# Patient Record
Sex: FEMALE | Race: WHITE | Employment: OTHER | URBAN - METROPOLITAN AREA
[De-identification: names, ages, dates, MRNs, and addresses within clinical notes are randomized per-mention and may not be internally consistent; named-entity substitution may affect disease eponyms.]

---

## 2017-10-11 ENCOUNTER — APPOINTMENT (OUTPATIENT)
Dept: GENERAL RADIOLOGY | Age: 57
End: 2017-10-11
Attending: EMERGENCY MEDICINE
Payer: MEDICARE

## 2017-10-11 ENCOUNTER — HOSPITAL ENCOUNTER (EMERGENCY)
Age: 57
Discharge: HOME | End: 2017-10-12
Attending: EMERGENCY MEDICINE | Admitting: EMERGENCY MEDICINE
Payer: MEDICARE

## 2017-10-11 ENCOUNTER — APPOINTMENT (OUTPATIENT)
Dept: CT IMAGING | Age: 57
End: 2017-10-11
Attending: EMERGENCY MEDICINE
Payer: MEDICARE

## 2017-10-11 DIAGNOSIS — K52.9 COLITIS: Primary | ICD-10-CM

## 2017-10-11 LAB
ALBUMIN SERPL-MCNC: 3.5 G/DL (ref 3.5–5)
ALBUMIN/GLOB SERPL: 0.8 {RATIO} (ref 1.1–2.2)
ALP SERPL-CCNC: 178 U/L (ref 45–117)
ALT SERPL-CCNC: 22 U/L (ref 12–78)
ANION GAP SERPL CALC-SCNC: 9 MMOL/L (ref 5–15)
APPEARANCE UR: CLEAR
AST SERPL-CCNC: 35 U/L (ref 15–37)
BACTERIA URNS QL MICRO: NEGATIVE /HPF
BASOPHILS # BLD: 0 K/UL (ref 0–0.1)
BASOPHILS NFR BLD: 0 % (ref 0–1)
BILIRUB SERPL-MCNC: 0.5 MG/DL (ref 0.2–1)
BILIRUB UR QL: NEGATIVE
BUN SERPL-MCNC: 16 MG/DL (ref 6–20)
BUN/CREAT SERPL: 11 (ref 12–20)
CALCIUM SERPL-MCNC: 8.9 MG/DL (ref 8.5–10.1)
CHLORIDE SERPL-SCNC: 96 MMOL/L (ref 97–108)
CO2 SERPL-SCNC: 29 MMOL/L (ref 21–32)
COLOR UR: ABNORMAL
CREAT SERPL-MCNC: 1.43 MG/DL (ref 0.55–1.02)
EOSINOPHIL # BLD: 0.2 K/UL (ref 0–0.4)
EOSINOPHIL NFR BLD: 1 % (ref 0–7)
EPITH CASTS URNS QL MICRO: ABNORMAL /LPF
ERYTHROCYTE [DISTWIDTH] IN BLOOD BY AUTOMATED COUNT: 12.9 % (ref 11.5–14.5)
GLOBULIN SER CALC-MCNC: 4.4 G/DL (ref 2–4)
GLUCOSE SERPL-MCNC: 151 MG/DL (ref 65–100)
GLUCOSE UR STRIP.AUTO-MCNC: NEGATIVE MG/DL
HCT VFR BLD AUTO: 41.7 % (ref 35–47)
HGB BLD-MCNC: 14.4 G/DL (ref 11.5–16)
HGB UR QL STRIP: ABNORMAL
HYALINE CASTS URNS QL MICRO: ABNORMAL /LPF (ref 0–5)
KETONES UR QL STRIP.AUTO: NEGATIVE MG/DL
LEUKOCYTE ESTERASE UR QL STRIP.AUTO: NEGATIVE
LIPASE SERPL-CCNC: 35 U/L (ref 73–393)
LYMPHOCYTES # BLD: 2.5 K/UL (ref 0.8–3.5)
LYMPHOCYTES NFR BLD: 19 % (ref 12–49)
MCH RBC QN AUTO: 30.7 PG (ref 26–34)
MCHC RBC AUTO-ENTMCNC: 34.5 G/DL (ref 30–36.5)
MCV RBC AUTO: 88.9 FL (ref 80–99)
MONOCYTES # BLD: 0.9 K/UL (ref 0–1)
MONOCYTES NFR BLD: 7 % (ref 5–13)
NEUTS SEG # BLD: 9.6 K/UL (ref 1.8–8)
NEUTS SEG NFR BLD: 73 % (ref 32–75)
NITRITE UR QL STRIP.AUTO: NEGATIVE
PH UR STRIP: 7 [PH] (ref 5–8)
PLATELET # BLD AUTO: 370 K/UL (ref 150–400)
POTASSIUM SERPL-SCNC: 4.1 MMOL/L (ref 3.5–5.1)
PROT SERPL-MCNC: 7.9 G/DL (ref 6.4–8.2)
PROT UR STRIP-MCNC: NEGATIVE MG/DL
RBC # BLD AUTO: 4.69 M/UL (ref 3.8–5.2)
RBC #/AREA URNS HPF: ABNORMAL /HPF (ref 0–5)
SODIUM SERPL-SCNC: 134 MMOL/L (ref 136–145)
SP GR UR REFRACTOMETRY: 1.02 (ref 1–1.03)
UR CULT HOLD, URHOLD: NORMAL
UROBILINOGEN UR QL STRIP.AUTO: 1 EU/DL (ref 0.2–1)
WBC # BLD AUTO: 13.1 K/UL (ref 3.6–11)
WBC URNS QL MICRO: ABNORMAL /HPF (ref 0–4)

## 2017-10-11 PROCEDURE — 96361 HYDRATE IV INFUSION ADD-ON: CPT

## 2017-10-11 PROCEDURE — 80053 COMPREHEN METABOLIC PANEL: CPT | Performed by: EMERGENCY MEDICINE

## 2017-10-11 PROCEDURE — 74011250636 HC RX REV CODE- 250/636: Performed by: EMERGENCY MEDICINE

## 2017-10-11 PROCEDURE — 81001 URINALYSIS AUTO W/SCOPE: CPT | Performed by: EMERGENCY MEDICINE

## 2017-10-11 PROCEDURE — 96375 TX/PRO/DX INJ NEW DRUG ADDON: CPT

## 2017-10-11 PROCEDURE — 85025 COMPLETE CBC W/AUTO DIFF WBC: CPT | Performed by: EMERGENCY MEDICINE

## 2017-10-11 PROCEDURE — 83690 ASSAY OF LIPASE: CPT | Performed by: EMERGENCY MEDICINE

## 2017-10-11 PROCEDURE — 36415 COLL VENOUS BLD VENIPUNCTURE: CPT | Performed by: EMERGENCY MEDICINE

## 2017-10-11 PROCEDURE — 96376 TX/PRO/DX INJ SAME DRUG ADON: CPT

## 2017-10-11 PROCEDURE — 99283 EMERGENCY DEPT VISIT LOW MDM: CPT

## 2017-10-11 PROCEDURE — 74176 CT ABD & PELVIS W/O CONTRAST: CPT

## 2017-10-11 PROCEDURE — 74020 XR ABD FLAT/ ERECT: CPT

## 2017-10-11 PROCEDURE — 96374 THER/PROPH/DIAG INJ IV PUSH: CPT

## 2017-10-11 RX ORDER — ONDANSETRON 2 MG/ML
8 INJECTION INTRAMUSCULAR; INTRAVENOUS
Status: COMPLETED | OUTPATIENT
Start: 2017-10-11 | End: 2017-10-11

## 2017-10-11 RX ORDER — HYDROMORPHONE HYDROCHLORIDE 1 MG/ML
0.5 INJECTION, SOLUTION INTRAMUSCULAR; INTRAVENOUS; SUBCUTANEOUS
Status: COMPLETED | OUTPATIENT
Start: 2017-10-11 | End: 2017-10-11

## 2017-10-11 RX ORDER — SODIUM CHLORIDE 0.9 % (FLUSH) 0.9 %
10 SYRINGE (ML) INJECTION
Status: DISCONTINUED | OUTPATIENT
Start: 2017-10-11 | End: 2017-10-12 | Stop reason: HOSPADM

## 2017-10-11 RX ORDER — HYDROMORPHONE HYDROCHLORIDE 1 MG/ML
0.5 INJECTION, SOLUTION INTRAMUSCULAR; INTRAVENOUS; SUBCUTANEOUS ONCE
Status: COMPLETED | OUTPATIENT
Start: 2017-10-11 | End: 2017-10-11

## 2017-10-11 RX ADMIN — ONDANSETRON 8 MG: 2 INJECTION INTRAMUSCULAR; INTRAVENOUS at 22:17

## 2017-10-11 RX ADMIN — SODIUM CHLORIDE 1000 ML: 900 INJECTION, SOLUTION INTRAVENOUS at 23:11

## 2017-10-11 RX ADMIN — SODIUM CHLORIDE 1000 ML: 900 INJECTION, SOLUTION INTRAVENOUS at 22:16

## 2017-10-11 RX ADMIN — HYDROMORPHONE HYDROCHLORIDE 0.5 MG: 1 INJECTION, SOLUTION INTRAMUSCULAR; INTRAVENOUS; SUBCUTANEOUS at 23:57

## 2017-10-11 RX ADMIN — HYDROMORPHONE HYDROCHLORIDE 0.5 MG: 1 INJECTION, SOLUTION INTRAMUSCULAR; INTRAVENOUS; SUBCUTANEOUS at 22:17

## 2017-10-11 NOTE — Clinical Note
Start the antibiotics. Please call and arrange close follow up with the gastroenterologist first thing in the morning. Let them know you were in the ED with abdominal pain and diagnosed with Colitis with bloody stool. If they are unable to see you with in 48 hours, or if your symptoms worsen despite the medications, fever, uncontrolled pain/vomiting or you get lightheaded/dizzy- return here right away.

## 2017-10-12 VITALS
OXYGEN SATURATION: 98 % | RESPIRATION RATE: 21 BRPM | HEIGHT: 64 IN | DIASTOLIC BLOOD PRESSURE: 75 MMHG | TEMPERATURE: 99.2 F | SYSTOLIC BLOOD PRESSURE: 158 MMHG | HEART RATE: 90 BPM

## 2017-10-12 LAB — HEMOCCULT STL QL: POSITIVE

## 2017-10-12 PROCEDURE — 82272 OCCULT BLD FECES 1-3 TESTS: CPT | Performed by: EMERGENCY MEDICINE

## 2017-10-12 PROCEDURE — 36415 COLL VENOUS BLD VENIPUNCTURE: CPT | Performed by: EMERGENCY MEDICINE

## 2017-10-12 PROCEDURE — 74011250637 HC RX REV CODE- 250/637: Performed by: EMERGENCY MEDICINE

## 2017-10-12 RX ORDER — METRONIDAZOLE 250 MG/1
TABLET ORAL
Status: DISCONTINUED
Start: 2017-10-12 | End: 2017-10-12 | Stop reason: HOSPADM

## 2017-10-12 RX ORDER — CIPROFLOXACIN 500 MG/1
TABLET ORAL
Status: DISCONTINUED
Start: 2017-10-12 | End: 2017-10-12 | Stop reason: HOSPADM

## 2017-10-12 RX ORDER — METRONIDAZOLE 250 MG/1
500 TABLET ORAL ONCE
Status: COMPLETED | OUTPATIENT
Start: 2017-10-12 | End: 2017-10-12

## 2017-10-12 RX ORDER — CIPROFLOXACIN 500 MG/1
500 TABLET ORAL ONCE
Status: COMPLETED | OUTPATIENT
Start: 2017-10-12 | End: 2017-10-12

## 2017-10-12 RX ORDER — METRONIDAZOLE 500 MG/1
500 TABLET ORAL 2 TIMES DAILY
Qty: 20 TAB | Refills: 0 | Status: SHIPPED | OUTPATIENT
Start: 2017-10-12 | End: 2017-10-22

## 2017-10-12 RX ORDER — CIPROFLOXACIN 500 MG/1
500 TABLET ORAL 2 TIMES DAILY
Qty: 20 TAB | Refills: 0 | Status: SHIPPED | OUTPATIENT
Start: 2017-10-12 | End: 2017-10-22

## 2017-10-12 RX ORDER — ONDANSETRON 8 MG/1
8 TABLET, ORALLY DISINTEGRATING ORAL
Qty: 8 TAB | Refills: 0 | Status: SHIPPED | OUTPATIENT
Start: 2017-10-12 | End: 2019-06-14 | Stop reason: ALTCHOICE

## 2017-10-12 RX ORDER — HYDROCODONE BITARTRATE AND ACETAMINOPHEN 5; 325 MG/1; MG/1
1-2 TABLET ORAL
Qty: 20 TAB | Refills: 0 | Status: SHIPPED | OUTPATIENT
Start: 2017-10-12 | End: 2019-06-04

## 2017-10-12 RX ADMIN — METRONIDAZOLE 500 MG: 250 TABLET ORAL at 01:03

## 2017-10-12 RX ADMIN — CIPROFLOXACIN HYDROCHLORIDE 500 MG: 500 TABLET, FILM COATED ORAL at 01:03

## 2017-10-12 NOTE — ED TRIAGE NOTES
Triage:  Pt to ED due to left lower abd pain and rectal bleeding. Pt states she has been dealing with constipation and has taking variety of OTC medications to aid with the constipation. Pt also mentioned she massages her abd to help \"break up the stool\" so she can pass it. PT states over the past couple hours she has had scant output and around 1800 hours she developed severe left lower quad pain and rectal bleeding. Pt into triage with steady gait, Pt appears anxious in triage, but no visible cues of distress present.
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
## 2017-10-12 NOTE — DISCHARGE INSTRUCTIONS
Colitis: Care Instructions  Your Care Instructions  Colitis is the medical term for swelling (inflammation) of the intestine. It can be caused by different things, such as an infection or loss of blood flow in the intestine. Other causes are problems like Crohn's disease or ulcerative colitis. Symptoms may include fever, diarrhea that may be bloody, or belly pain. Sometimes symptoms go away without treatment. But you may need treatment or more tests, such as blood tests or a stool test. Or you may need imaging tests like a CT scan or a colonoscopy. In some cases, the doctor may want to test a sample of tissue from the intestine. This test is called a biopsy. The doctor has checked you carefully, but problems can develop later. If you notice any problems or new symptoms, get medical treatment right away. Follow-up care is a key part of your treatment and safety. Be sure to make and go to all appointments, and call your doctor if you are having problems. It's also a good idea to know your test results and keep a list of the medicines you take. How can you care for yourself at home? · Rest until you feel better. · Your doctor may recommend that you eat bland foods. These include rice, dry toast or crackers, bananas, and applesauce. · To prevent dehydration, drink plenty of fluids. Choose water and other caffeine-free clear liquids until you feel better. If you have kidney, heart, or liver disease and have to limit fluids, talk with your doctor before you increase the amount of fluids you drink. · Be safe with medicines. Take your medicines exactly as prescribed. Call your doctor if you think you are having a problem with your medicine. You will get more details on the specific medicines your doctor prescribes. When should you call for help? Call 911 anytime you think you may need emergency care. For example, call if:  · You passed out (lost consciousness).   · You vomit blood or what looks like coffee grounds. · Your stools are maroon or very bloody. Call your doctor now or seek immediate medical care if:  · You have new or worse pain. · You have a new or higher fever. · You have new or worse symptoms. · You cannot keep fluids or medicines down. Watch closely for changes in your health, and be sure to contact your doctor if:  · You do not get better as expected. Where can you learn more? Go to http://nicolás-jodie.info/. Julius Montes in the search box to learn more about \"Colitis: Care Instructions. \"  Current as of: August 9, 2016  Content Version: 11.3  © 0126-4357 [employer]. Care instructions adapted under license by [employer] (which disclaims liability or warranty for this information). If you have questions about a medical condition or this instruction, always ask your healthcare professional. [address] any warranty or liability for your use of this information. We hope that we have addressed all of your medical concerns. The examination and treatment you received in the Emergency Department were for an emergent problem and were not intended as complete care. It is important that you follow up with your healthcare provider(s) for ongoing care. If your symptoms worsen or do not improve as expected, and you are unable to reach your usual health care provider(s), you should return to the Emergency Department. Today's healthcare is undergoing tremendous change, and patient satisfaction surveys are one of the many tools to assess the quality of medical care. You may receive a survey from the [employer] organization regarding your experience in the Emergency Department. I hope that your experience has been completely positive, particularly the medical care that I provided. As such, please participate in the survey; anything less than excellent does not meet my expectations or intentions.         [facility] and [facility] Macias participate in nationally recognized quality of care measures. If your blood pressure is greater than 120/80, as reported below, we urge that you seek medical care to address the potential of high blood pressure, commonly known as hypertension. Hypertension can be hereditary or can be caused by certain medical conditions, pain, stress, or \"white coat syndrome. \"       Please make an appointment with your health care provider(s) for follow up of your Emergency Department visit. VITALS:   Patient Vitals for the past 8 hrs:   Temp Pulse Resp BP SpO2   10/12/17 0028 99.2 °F (37.3 °C) 90 21 158/75 98 %   10/11/17 2009 99 °F (37.2 °C) (!) 108 22 184/82 94 %          Thank you for allowing us to provide you with medical care today. We realize that you have many choices for your emergency care needs. Please choose us in the future for any continued health care needs. Lul Foley MD    [address] [place] [address].   Office: [phone]            Recent Results (from the past 24 hour(s))   METABOLIC PANEL, COMPREHENSIVE    Collection Time: 10/11/17  8:43 PM   Result Value Ref Range    Sodium 134 (L) 136 - 145 mmol/L    Potassium 4.1 3.5 - 5.1 mmol/L    Chloride 96 (L) 97 - 108 mmol/L    CO2 29 21 - 32 mmol/L    Anion gap 9 5 - 15 mmol/L    Glucose 151 (H) 65 - 100 mg/dL    BUN 16 6 - 20 MG/DL    Creatinine 1.43 (H) 0.55 - 1.02 MG/DL    BUN/Creatinine ratio 11 (L) 12 - 20      GFR est AA 46 (L) >60 ml/min/1.73m2    GFR est non-AA 38 (L) >60 ml/min/1.73m2    Calcium 8.9 8.5 - 10.1 MG/DL    Bilirubin, total 0.5 0.2 - 1.0 MG/DL    ALT (SGPT) 22 12 - 78 U/L    AST (SGOT) 35 15 - 37 U/L    Alk.  phosphatase 178 (H) 45 - 117 U/L    Protein, total 7.9 6.4 - 8.2 g/dL    Albumin 3.5 3.5 - 5.0 g/dL    Globulin 4.4 (H) 2.0 - 4.0 g/dL    A-G Ratio 0.8 (L) 1.1 - 2.2     LIPASE    Collection Time: 10/11/17  8:43 PM   Result Value Ref Range    Lipase 35 (L) 73 - 393 U/L   CBC WITH AUTOMATED DIFF    Collection Time: 10/11/17  8:43 PM   Result Value Ref Range    WBC 13.1 (H) 3.6 - 11.0 K/uL    RBC 4.69 3.80 - 5.20 M/uL    HGB 14.4 11.5 - 16.0 g/dL    HCT 41.7 35.0 - 47.0 %    MCV 88.9 80.0 - 99.0 FL    MCH 30.7 26.0 - 34.0 PG    MCHC 34.5 30.0 - 36.5 g/dL    RDW 12.9 11.5 - 14.5 %    PLATELET 323 521 - 939 K/uL    NEUTROPHILS 73 32 - 75 %    LYMPHOCYTES 19 12 - 49 %    MONOCYTES 7 5 - 13 %    EOSINOPHILS 1 0 - 7 %    BASOPHILS 0 0 - 1 %    ABS. NEUTROPHILS 9.6 (H) 1.8 - 8.0 K/UL    ABS. LYMPHOCYTES 2.5 0.8 - 3.5 K/UL    ABS. MONOCYTES 0.9 0.0 - 1.0 K/UL    ABS. EOSINOPHILS 0.2 0.0 - 0.4 K/UL    ABS. BASOPHILS 0.0 0.0 - 0.1 K/UL   URINALYSIS W/MICROSCOPIC    Collection Time: 10/11/17  9:19 PM   Result Value Ref Range    Color YELLOW/STRAW      Appearance CLEAR CLEAR      Specific gravity 1.016 1.003 - 1.030      pH (UA) 7.0 5.0 - 8.0      Protein NEGATIVE  NEG mg/dL    Glucose NEGATIVE  NEG mg/dL    Ketone NEGATIVE  NEG mg/dL    Bilirubin NEGATIVE  NEG      Blood TRACE (A) NEG      Urobilinogen 1.0 0.2 - 1.0 EU/dL    Nitrites NEGATIVE  NEG      Leukocyte Esterase NEGATIVE  NEG      WBC 0-4 0 - 4 /hpf    RBC 5-10 0 - 5 /hpf    Epithelial cells FEW FEW /lpf    Bacteria NEGATIVE  NEG /hpf    Hyaline cast 0-2 0 - 5 /lpf   URINE CULTURE HOLD SAMPLE    Collection Time: 10/11/17  9:19 PM   Result Value Ref Range    Urine culture hold URINE ON HOLD IN MICROBIOLOGY DEPT FOR 3 DAYS     OCCULT BLOOD, STOOL    Collection Time: 10/12/17 12:16 AM   Result Value Ref Range    Occult blood, stool POSITIVE (A) NEG         Xr Abd Flat/ Erect    Result Date: 10/11/2017  Clinical history: Abdominal pain, constipation FINDINGS: Flat and upright views of the abdomen are obtained. There is no obstruction or free air. There is no organomegaly. There is no abnormal calcification. Postcholecystectomy. IMPRESSION: No acute process demonstrated.     Ct Abd Pelv Wo Cont    Result Date: 10/11/2017  EXAM:  CT ABD PELV WO CONT Clinical history: Left lower quadrant pain with bloody stools INDICATION: LLQ pain, bloody stools COMPARISON: 2011 CONTRAST:  None. TECHNIQUE: Thin axial images were obtained through the abdomen and pelvis. Coronal and sagittal reconstructions were generated. Oral contrast was not administered. CT dose reduction was achieved through use of a standardized protocol tailored for this examination and automatic exposure control for dose modulation. The absence of intravenous contrast material reduces the sensitivity for evaluation of the solid parenchymal organs of the abdomen. FINDINGS: LUNG BASES: There is minimal dependent change at the right lung base. Tiny nodules at the right and left lung bases. INCIDENTALLY IMAGED HEART AND MEDIASTINUM: Unremarkable. LIVER: Heterogenous hepatic density likely related to hepatic steatosis GALLBLADDER: Absent SPLEEN: No mass. PANCREAS: Duodenal diverticulum. Mild prominent CBD in the pancreatic head this is not changed compared to 2011 ADRENALS: Unremarkable. KIDNEYS/URETERS: Renal hypodensities on the right are consistent with simple cysts unchanged. STOMACH: Unremarkable. SMALL BOWEL: No dilatation or wall thickening. COLON: There is colonic wall thickening in the descending colon extending into the sigmoid colon. Minimal pericolonic inflammatory stranding. APPENDIX: Unremarkable. PERITONEUM: No ascites or pneumoperitoneum. RETROPERITONEUM: No lymphadenopathy or aortic aneurysm. REPRODUCTIVE ORGANS: URINARY BLADDER: No mass or calculus. BONES: No destructive bone lesion. ADDITIONAL COMMENTS: N/A     IMPRESSION: Findings which are  consistent with a colitis affecting the descending colon. No abscess or drainable fluid collection. No obstruction. There are renal cysts which are unchanged compared to 2011. Heterogenous hepatic density is likely related to hepatic steatosis. Status post cholecystectomy.

## 2017-10-12 NOTE — ED NOTES
MD reviewed discharge instructions with the patient. The patient verbalized understanding. Patient ambulated out of ED by herself. No complaints or concerns.

## 2018-05-31 ENCOUNTER — HOSPITAL ENCOUNTER (EMERGENCY)
Age: 58
Discharge: HOME | End: 2018-06-01
Attending: EMERGENCY MEDICINE | Admitting: EMERGENCY MEDICINE
Payer: MEDICARE

## 2018-05-31 ENCOUNTER — APPOINTMENT (OUTPATIENT)
Dept: CT IMAGING | Age: 58
End: 2018-05-31
Attending: EMERGENCY MEDICINE
Payer: MEDICARE

## 2018-05-31 DIAGNOSIS — R51.9 NONINTRACTABLE HEADACHE, UNSPECIFIED CHRONICITY PATTERN, UNSPECIFIED HEADACHE TYPE: Primary | ICD-10-CM

## 2018-05-31 DIAGNOSIS — R20.2 PARESTHESIA OF BOTH HANDS: ICD-10-CM

## 2018-05-31 LAB
ALBUMIN SERPL-MCNC: 3.5 G/DL (ref 3.5–5)
ALBUMIN/GLOB SERPL: 1 {RATIO} (ref 1.1–2.2)
ALP SERPL-CCNC: 136 U/L (ref 45–117)
ALT SERPL-CCNC: 22 U/L (ref 12–78)
ANION GAP SERPL CALC-SCNC: 8 MMOL/L (ref 5–15)
AST SERPL-CCNC: 21 U/L (ref 15–37)
BASOPHILS # BLD: 0.1 K/UL (ref 0–0.1)
BASOPHILS NFR BLD: 1 % (ref 0–1)
BILIRUB SERPL-MCNC: 0.1 MG/DL (ref 0.2–1)
BUN SERPL-MCNC: 12 MG/DL (ref 6–20)
BUN/CREAT SERPL: 10 (ref 12–20)
CALCIUM SERPL-MCNC: 8.4 MG/DL (ref 8.5–10.1)
CHLORIDE SERPL-SCNC: 106 MMOL/L (ref 97–108)
CO2 SERPL-SCNC: 30 MMOL/L (ref 21–32)
CREAT SERPL-MCNC: 1.2 MG/DL (ref 0.55–1.02)
DIFFERENTIAL METHOD BLD: ABNORMAL
EOSINOPHIL # BLD: 0.8 K/UL (ref 0–0.4)
EOSINOPHIL NFR BLD: 9 % (ref 0–7)
ERYTHROCYTE [DISTWIDTH] IN BLOOD BY AUTOMATED COUNT: 12.8 % (ref 11.5–14.5)
GLOBULIN SER CALC-MCNC: 3.5 G/DL (ref 2–4)
GLUCOSE SERPL-MCNC: 133 MG/DL (ref 65–100)
HCT VFR BLD AUTO: 38.1 % (ref 35–47)
HGB BLD-MCNC: 12.5 G/DL (ref 11.5–16)
IMM GRANULOCYTES # BLD: 0 K/UL (ref 0–0.04)
IMM GRANULOCYTES NFR BLD AUTO: 0 % (ref 0–0.5)
LYMPHOCYTES # BLD: 2.9 K/UL (ref 0.8–3.5)
LYMPHOCYTES NFR BLD: 31 % (ref 12–49)
MCH RBC QN AUTO: 30 PG (ref 26–34)
MCHC RBC AUTO-ENTMCNC: 32.8 G/DL (ref 30–36.5)
MCV RBC AUTO: 91.4 FL (ref 80–99)
MONOCYTES # BLD: 0.7 K/UL (ref 0–1)
MONOCYTES NFR BLD: 7 % (ref 5–13)
NEUTS SEG # BLD: 4.8 K/UL (ref 1.8–8)
NEUTS SEG NFR BLD: 52 % (ref 32–75)
NRBC # BLD: 0 K/UL (ref 0–0.01)
NRBC BLD-RTO: 0 PER 100 WBC
PLATELET # BLD AUTO: 354 K/UL (ref 150–400)
PMV BLD AUTO: 9.3 FL (ref 8.9–12.9)
POTASSIUM SERPL-SCNC: 3.4 MMOL/L (ref 3.5–5.1)
PROT SERPL-MCNC: 7 G/DL (ref 6.4–8.2)
RBC # BLD AUTO: 4.17 M/UL (ref 3.8–5.2)
SODIUM SERPL-SCNC: 144 MMOL/L (ref 136–145)
WBC # BLD AUTO: 9.4 K/UL (ref 3.6–11)

## 2018-05-31 PROCEDURE — 96361 HYDRATE IV INFUSION ADD-ON: CPT

## 2018-05-31 PROCEDURE — 99283 EMERGENCY DEPT VISIT LOW MDM: CPT

## 2018-05-31 PROCEDURE — 85025 COMPLETE CBC W/AUTO DIFF WBC: CPT | Performed by: EMERGENCY MEDICINE

## 2018-05-31 PROCEDURE — 96375 TX/PRO/DX INJ NEW DRUG ADDON: CPT

## 2018-05-31 PROCEDURE — 80053 COMPREHEN METABOLIC PANEL: CPT | Performed by: EMERGENCY MEDICINE

## 2018-05-31 PROCEDURE — 74011250636 HC RX REV CODE- 250/636: Performed by: EMERGENCY MEDICINE

## 2018-05-31 PROCEDURE — 36415 COLL VENOUS BLD VENIPUNCTURE: CPT | Performed by: EMERGENCY MEDICINE

## 2018-05-31 PROCEDURE — 70450 CT HEAD/BRAIN W/O DYE: CPT

## 2018-05-31 PROCEDURE — 96374 THER/PROPH/DIAG INJ IV PUSH: CPT

## 2018-05-31 RX ORDER — METOCLOPRAMIDE HYDROCHLORIDE 5 MG/ML
10 INJECTION INTRAMUSCULAR; INTRAVENOUS
Status: COMPLETED | OUTPATIENT
Start: 2018-05-31 | End: 2018-05-31

## 2018-05-31 RX ORDER — DIPHENHYDRAMINE HYDROCHLORIDE 50 MG/ML
25 INJECTION, SOLUTION INTRAMUSCULAR; INTRAVENOUS
Status: COMPLETED | OUTPATIENT
Start: 2018-05-31 | End: 2018-05-31

## 2018-05-31 RX ORDER — SODIUM CHLORIDE 9 MG/ML
1000 INJECTION, SOLUTION INTRAVENOUS ONCE
Status: COMPLETED | OUTPATIENT
Start: 2018-05-31 | End: 2018-05-31

## 2018-05-31 RX ORDER — KETOROLAC TROMETHAMINE 30 MG/ML
15 INJECTION, SOLUTION INTRAMUSCULAR; INTRAVENOUS
Status: COMPLETED | OUTPATIENT
Start: 2018-05-31 | End: 2018-05-31

## 2018-05-31 RX ORDER — DEXAMETHASONE SODIUM PHOSPHATE 10 MG/ML
10 INJECTION INTRAMUSCULAR; INTRAVENOUS ONCE
Status: COMPLETED | OUTPATIENT
Start: 2018-05-31 | End: 2018-05-31

## 2018-05-31 RX ADMIN — METOCLOPRAMIDE 10 MG: 5 INJECTION, SOLUTION INTRAMUSCULAR; INTRAVENOUS at 21:45

## 2018-05-31 RX ADMIN — SODIUM CHLORIDE 1000 ML: 900 INJECTION, SOLUTION INTRAVENOUS at 21:48

## 2018-05-31 RX ADMIN — DEXAMETHASONE SODIUM PHOSPHATE 10 MG: 10 INJECTION, SOLUTION INTRAMUSCULAR; INTRAVENOUS at 21:47

## 2018-05-31 RX ADMIN — KETOROLAC TROMETHAMINE 15 MG: 30 INJECTION, SOLUTION INTRAMUSCULAR; INTRAVENOUS at 21:44

## 2018-05-31 RX ADMIN — DIPHENHYDRAMINE HYDROCHLORIDE 25 MG: 50 INJECTION, SOLUTION INTRAMUSCULAR; INTRAVENOUS at 21:48

## 2018-06-01 VITALS
RESPIRATION RATE: 18 BRPM | OXYGEN SATURATION: 94 % | SYSTOLIC BLOOD PRESSURE: 116 MMHG | TEMPERATURE: 98.1 F | BODY MASS INDEX: 23.73 KG/M2 | HEART RATE: 72 BPM | DIASTOLIC BLOOD PRESSURE: 67 MMHG | HEIGHT: 64 IN | WEIGHT: 139 LBS

## 2018-06-01 NOTE — DISCHARGE INSTRUCTIONS
Headache: Care Instructions  Your Care Instructions    Headaches have many possible causes. Most headaches aren't a sign of a more serious problem, and they will get better on their own. Home treatment may help you feel better faster. The doctor has checked you carefully, but problems can develop later. If you notice any problems or new symptoms, get medical treatment right away. Follow-up care is a key part of your treatment and safety. Be sure to make and go to all appointments, and call your doctor if you are having problems. It's also a good idea to know your test results and keep a list of the medicines you take. How can you care for yourself at home? · Do not drive if you have taken a prescription pain medicine. · Rest in a quiet, dark room until your headache is gone. Close your eyes and try to relax or go to sleep. Don't watch TV or read. · Put a cold, moist cloth or cold pack on the painful area for 10 to 20 minutes at a time. Put a thin cloth between the cold pack and your skin. · Use a warm, moist towel or a heating pad set on low to relax tight shoulder and neck muscles. · Have someone gently massage your neck and shoulders. · Take pain medicines exactly as directed. ¨ If the doctor gave you a prescription medicine for pain, take it as prescribed. ¨ If you are not taking a prescription pain medicine, ask your doctor if you can take an over-the-counter medicine. · Be careful not to take pain medicine more often than the instructions allow, because you may get worse or more frequent headaches when the medicine wears off. · Do not ignore new symptoms that occur with a headache, such as a fever, weakness or numbness, vision changes, or confusion. These may be signs of a more serious problem. To prevent headaches  · Keep a headache diary so you can figure out what triggers your headaches. Avoiding triggers may help you prevent headaches.  Record when each headache began, how long it lasted, and what the pain was like (throbbing, aching, stabbing, or dull). Write down any other symptoms you had with the headache, such as nausea, flashing lights or dark spots, or sensitivity to bright light or loud noise. Note if the headache occurred near your period. List anything that might have triggered the headache, such as certain foods (chocolate, cheese, wine) or odors, smoke, bright light, stress, or lack of sleep. · Find healthy ways to deal with stress. Headaches are most common during or right after stressful times. Take time to relax before and after you do something that has caused a headache in the past.  · Try to keep your muscles relaxed by keeping good posture. Check your jaw, face, neck, and shoulder muscles for tension, and try relaxing them. When sitting at a desk, change positions often, and stretch for 30 seconds each hour. · Get plenty of sleep and exercise. · Eat regularly and well. Long periods without food can trigger a headache. · Treat yourself to a massage. Some people find that regular massages are very helpful in relieving tension. · Limit caffeine by not drinking too much coffee, tea, or soda. But don't quit caffeine suddenly, because that can also give you headaches. · Reduce eyestrain from computers by blinking frequently and looking away from the computer screen every so often. Make sure you have proper eyewear and that your monitor is set up properly, about an arm's length away. · Seek help if you have depression or anxiety. Your headaches may be linked to these conditions. Treatment can both prevent headaches and help with symptoms of anxiety or depression. When should you call for help? Call 911 anytime you think you may need emergency care. For example, call if:  ? · You have signs of a stroke. These may include:  ¨ Sudden numbness, paralysis, or weakness in your face, arm, or leg, especially on only one side of your body. ¨ Sudden vision changes.   ¨ Sudden trouble speaking. ¨ Sudden confusion or trouble understanding simple statements. ¨ Sudden problems with walking or balance. ¨ A sudden, severe headache that is different from past headaches. ?Call your doctor now or seek immediate medical care if:  ? · You have a new or worse headache. ? · Your headache gets much worse. Where can you learn more? Go to http://nicolás-jodie.info/. Enter M271 in the search box to learn more about \"Headache: Care Instructions. \"  Current as of: October 14, 2016  Content Version: 11.4  © 9161-0929 [employer]. Care instructions adapted under license by [employer] (which disclaims liability or warranty for this information). If you have questions about a medical condition or this instruction, always ask your healthcare professional. [address] any warranty or liability for your use of this information. Numbness and Tingling: Care Instructions  Your Care Instructions    Many things can cause numbness or tingling. Swelling may put pressure on a nerve. This could cause you to lose feeling or have a pins-and-needles sensation on part of your body. Nerves may be damaged from trauma, toxins, or diseases, such as diabetes or multiple sclerosis (MS). Sometimes, though, the cause is not clear. If there is no clear reason for your symptoms, and you are not having any other symptoms, your doctor may suggest watching and waiting for a while to see if the numbness or tingling goes away on its own. Your doctor may want you to have blood or nerve tests to find the cause of your symptoms. Follow-up care is a key part of your treatment and safety. Be sure to make and go to all appointments, and call your doctor if you are having problems. It's also a good idea to know your test results and keep a list of the medicines you take. How can you care for yourself at home?   · If your doctor prescribes medicine, take it exactly as directed. Call your doctor if you think you are having a problem with your medicine. · If you have any swelling, put ice or a cold pack on the area for 10 to 20 minutes at a time. Put a thin cloth between the ice and your skin. When should you call for help? Call 911 anytime you think you may need emergency care. For example, call if:  ? · You have weakness, numbness, or tingling in both legs. ? · You lose bowel or bladder control. ? · You have symptoms of a stroke. These may include:  ¨ Sudden numbness, tingling, weakness, or loss of movement in your face, arm, or leg, especially on only one side of your body. ¨ Sudden vision changes. ¨ Sudden trouble speaking. ¨ Sudden confusion or trouble understanding simple statements. ¨ Sudden problems with walking or balance. ¨ A sudden, severe headache that is different from past headaches. ? Watch closely for changes in your health, and be sure to contact your doctor if you have any problems, or if:  ? · You do not get better as expected. Where can you learn more? Go to http://nicolás-jodie.info/. Enter U944 in the search box to learn more about \"Numbness and Tingling: Care Instructions. \"  Current as of: October 14, 2016  Content Version: 11.4  © 9931-8634 [employer]. Care instructions adapted under license by [employer] (which disclaims liability or warranty for this information). If you have questions about a medical condition or this instruction, always ask your healthcare professional. [place] [address] any warranty or liability for your use of this information.

## 2018-06-01 NOTE — ED TRIAGE NOTES
Triage Note: Pt complains of headache that started 2 days ago with dizziness and visual changes. Pt has taken \"the green BC\" and ibuprofen without relief. Started feeling tingling in her left fingers yesterday. Stated she has a hx of migraines. Also complaining of \"knots\" on her skin and bruising. Pt rambling and restless in triage.

## 2018-06-01 NOTE — ED NOTES
PA reviewed discharge instructions and options with patient and patient verbalized understanding. RN reviewed discharge instructions using teachback method. Pt ambulated to exit without difficulty and in no signs of acute distress escorted by self who will drive home. No complaints or needs expressed at this time. VSS at time of discharge. Pt to call PCP in the morning for follow up.
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
## 2019-05-18 ENCOUNTER — APPOINTMENT (OUTPATIENT)
Dept: NON INVASIVE DIAGNOSTICS | Age: 59
DRG: 215 | End: 2019-05-18
Attending: INTERNAL MEDICINE
Payer: MEDICARE

## 2019-05-18 ENCOUNTER — APPOINTMENT (OUTPATIENT)
Dept: CT IMAGING | Age: 59
DRG: 215 | End: 2019-05-18
Attending: HOSPITALIST
Payer: MEDICARE

## 2019-05-18 ENCOUNTER — HOSPITAL ENCOUNTER (INPATIENT)
Age: 59
LOS: 17 days | Discharge: HOME | DRG: 215 | End: 2019-06-04
Attending: EMERGENCY MEDICINE | Admitting: HOSPITALIST
Payer: MEDICARE

## 2019-05-18 ENCOUNTER — APPOINTMENT (OUTPATIENT)
Dept: GENERAL RADIOLOGY | Age: 59
DRG: 215 | End: 2019-05-18
Attending: NURSE PRACTITIONER
Payer: MEDICARE

## 2019-05-18 DIAGNOSIS — Z01.818 PREOPERATIVE EVALUATION TO RULE OUT SURGICAL CONTRAINDICATION: ICD-10-CM

## 2019-05-18 DIAGNOSIS — I50.23 CHF (CONGESTIVE HEART FAILURE), NYHA CLASS IV, ACUTE ON CHRONIC, SYSTOLIC (HCC): ICD-10-CM

## 2019-05-18 DIAGNOSIS — I50.23 SYSTOLIC CHF, ACUTE ON CHRONIC (HCC): ICD-10-CM

## 2019-05-18 DIAGNOSIS — R07.82 INTERCOSTAL PAIN: ICD-10-CM

## 2019-05-18 DIAGNOSIS — G56.01 CARPAL TUNNEL SYNDROME, RIGHT: ICD-10-CM

## 2019-05-18 DIAGNOSIS — F14.10 COCAINE ABUSE (HCC): ICD-10-CM

## 2019-05-18 DIAGNOSIS — J44.1 COPD EXACERBATION (HCC): Primary | ICD-10-CM

## 2019-05-18 DIAGNOSIS — Z71.89 GOALS OF CARE, COUNSELING/DISCUSSION: ICD-10-CM

## 2019-05-18 DIAGNOSIS — R53.83 FATIGUE, UNSPECIFIED TYPE: ICD-10-CM

## 2019-05-18 DIAGNOSIS — R06.02 SHORTNESS OF BREATH: ICD-10-CM

## 2019-05-18 DIAGNOSIS — I21.4 NSTEMI (NON-ST ELEVATED MYOCARDIAL INFARCTION) (HCC): ICD-10-CM

## 2019-05-18 DIAGNOSIS — E27.1 ADRENAL INSUFFICIENCY (ADDISON'S DISEASE) (HCC): ICD-10-CM

## 2019-05-18 DIAGNOSIS — I95.89 OTHER SPECIFIED HYPOTENSION: ICD-10-CM

## 2019-05-18 DIAGNOSIS — R57.0 CARDIOGENIC SHOCK (HCC): ICD-10-CM

## 2019-05-18 DIAGNOSIS — R77.8 ELEVATED TROPONIN I LEVEL: ICD-10-CM

## 2019-05-18 DIAGNOSIS — R07.9 CHEST PAIN: ICD-10-CM

## 2019-05-18 DIAGNOSIS — M79.7 FIBROMYALGIA: Chronic | ICD-10-CM

## 2019-05-18 DIAGNOSIS — Z79.899 RECEIVING INOTROPIC MEDICATION: ICD-10-CM

## 2019-05-18 DIAGNOSIS — Z71.89 ADVANCED CARE PLANNING/COUNSELING DISCUSSION: ICD-10-CM

## 2019-05-18 LAB
ANION GAP SERPL CALC-SCNC: 6 MMOL/L (ref 5–15)
BASOPHILS # BLD: 0.1 K/UL (ref 0–0.1)
BASOPHILS NFR BLD: 1 % (ref 0–1)
BUN SERPL-MCNC: 21 MG/DL (ref 6–20)
BUN/CREAT SERPL: 16 (ref 12–20)
CALCIUM SERPL-MCNC: 9 MG/DL (ref 8.5–10.1)
CHLORIDE SERPL-SCNC: 98 MMOL/L (ref 97–108)
CO2 SERPL-SCNC: 31 MMOL/L (ref 21–32)
COMMENT, HOLDF: NORMAL
CREAT SERPL-MCNC: 1.28 MG/DL (ref 0.55–1.02)
D DIMER PPP FEU-MCNC: 1.27 MG/L FEU (ref 0–0.65)
DIFFERENTIAL METHOD BLD: NORMAL
ECHO LV E' LATERAL VELOCITY: 5.31 CM/S
ECHO LV E' SEPTAL VELOCITY: 4.94 CM/S
ECHO PULMONARY ARTERY SYSTOLIC PRESSURE (PASP): 27 MMHG
END DIASTOLIC PRESSURE: 15
EOSINOPHIL # BLD: 0 K/UL (ref 0–0.4)
EOSINOPHIL NFR BLD: 0 % (ref 0–7)
ERYTHROCYTE [DISTWIDTH] IN BLOOD BY AUTOMATED COUNT: 13.2 % (ref 11.5–14.5)
GLUCOSE BLD STRIP.AUTO-MCNC: 339 MG/DL (ref 65–100)
GLUCOSE SERPL-MCNC: 179 MG/DL (ref 65–100)
HCT VFR BLD AUTO: 40.8 % (ref 35–47)
HGB BLD-MCNC: 13.2 G/DL (ref 11.5–16)
IMM GRANULOCYTES # BLD AUTO: 0 K/UL (ref 0–0.04)
IMM GRANULOCYTES NFR BLD AUTO: 0 % (ref 0–0.5)
LYMPHOCYTES # BLD: 1.9 K/UL (ref 0.8–3.5)
LYMPHOCYTES NFR BLD: 20 % (ref 12–49)
MCH RBC QN AUTO: 28.5 PG (ref 26–34)
MCHC RBC AUTO-ENTMCNC: 32.4 G/DL (ref 30–36.5)
MCV RBC AUTO: 88.1 FL (ref 80–99)
MONOCYTES # BLD: 0.8 K/UL (ref 0–1)
MONOCYTES NFR BLD: 8 % (ref 5–13)
NEUTS SEG # BLD: 6.8 K/UL (ref 1.8–8)
NEUTS SEG NFR BLD: 71 % (ref 32–75)
NRBC # BLD: 0 K/UL (ref 0–0.01)
NRBC BLD-RTO: 0 PER 100 WBC
PLATELET # BLD AUTO: 351 K/UL (ref 150–400)
PMV BLD AUTO: 9.2 FL (ref 8.9–12.9)
POTASSIUM SERPL-SCNC: 3.1 MMOL/L (ref 3.5–5.1)
RBC # BLD AUTO: 4.63 M/UL (ref 3.8–5.2)
SAMPLES BEING HELD,HOLD: NORMAL
SERVICE CMNT-IMP: ABNORMAL
SODIUM SERPL-SCNC: 135 MMOL/L (ref 136–145)
TROPONIN I SERPL-MCNC: 11.9 NG/ML
TROPONIN I SERPL-MCNC: 4.62 NG/ML
WBC # BLD AUTO: 9.7 K/UL (ref 3.6–11)

## 2019-05-18 PROCEDURE — 74011250636 HC RX REV CODE- 250/636

## 2019-05-18 PROCEDURE — 94762 N-INVAS EAR/PLS OXIMTRY CONT: CPT

## 2019-05-18 PROCEDURE — B2151ZZ FLUOROSCOPY OF LEFT HEART USING LOW OSMOLAR CONTRAST: ICD-10-PCS | Performed by: INTERNAL MEDICINE

## 2019-05-18 PROCEDURE — 94640 AIRWAY INHALATION TREATMENT: CPT

## 2019-05-18 PROCEDURE — 77030029684 HC NEB SM VOL KT MONA -A

## 2019-05-18 PROCEDURE — 84484 ASSAY OF TROPONIN QUANT: CPT

## 2019-05-18 PROCEDURE — 99285 EMERGENCY DEPT VISIT HI MDM: CPT

## 2019-05-18 PROCEDURE — 99153 MOD SED SAME PHYS/QHP EA: CPT | Performed by: INTERNAL MEDICINE

## 2019-05-18 PROCEDURE — 74011000250 HC RX REV CODE- 250: Performed by: HOSPITALIST

## 2019-05-18 PROCEDURE — 74011250637 HC RX REV CODE- 250/637: Performed by: HOSPITALIST

## 2019-05-18 PROCEDURE — 74011250636 HC RX REV CODE- 250/636: Performed by: INTERNAL MEDICINE

## 2019-05-18 PROCEDURE — 96374 THER/PROPH/DIAG INJ IV PUSH: CPT

## 2019-05-18 PROCEDURE — 93306 TTE W/DOPPLER COMPLETE: CPT

## 2019-05-18 PROCEDURE — 65610000003 HC RM ICU SURGICAL

## 2019-05-18 PROCEDURE — 93005 ELECTROCARDIOGRAM TRACING: CPT

## 2019-05-18 PROCEDURE — 71250 CT THORAX DX C-: CPT

## 2019-05-18 PROCEDURE — B2111ZZ FLUOROSCOPY OF MULTIPLE CORONARY ARTERIES USING LOW OSMOLAR CONTRAST: ICD-10-PCS | Performed by: INTERNAL MEDICINE

## 2019-05-18 PROCEDURE — 93458 L HRT ARTERY/VENTRICLE ANGIO: CPT | Performed by: INTERNAL MEDICINE

## 2019-05-18 PROCEDURE — 71045 X-RAY EXAM CHEST 1 VIEW: CPT

## 2019-05-18 PROCEDURE — 77030019569 HC BND COMPR RAD TERU -B: Performed by: INTERNAL MEDICINE

## 2019-05-18 PROCEDURE — 74011250636 HC RX REV CODE- 250/636: Performed by: HOSPITALIST

## 2019-05-18 PROCEDURE — 74011636637 HC RX REV CODE- 636/637: Performed by: EMERGENCY MEDICINE

## 2019-05-18 PROCEDURE — 85379 FIBRIN DEGRADATION QUANT: CPT

## 2019-05-18 PROCEDURE — 77030013744: Performed by: INTERNAL MEDICINE

## 2019-05-18 PROCEDURE — 74011000250 HC RX REV CODE- 250: Performed by: INTERNAL MEDICINE

## 2019-05-18 PROCEDURE — 74011250637 HC RX REV CODE- 250/637: Performed by: EMERGENCY MEDICINE

## 2019-05-18 PROCEDURE — 74011000250 HC RX REV CODE- 250: Performed by: NURSE PRACTITIONER

## 2019-05-18 PROCEDURE — 85025 COMPLETE CBC W/AUTO DIFF WBC: CPT

## 2019-05-18 PROCEDURE — 82962 GLUCOSE BLOOD TEST: CPT

## 2019-05-18 PROCEDURE — 4A023N8 MEASUREMENT OF CARDIAC SAMPLING AND PRESSURE, BILATERAL, PERCUTANEOUS APPROACH: ICD-10-PCS | Performed by: INTERNAL MEDICINE

## 2019-05-18 PROCEDURE — 36415 COLL VENOUS BLD VENIPUNCTURE: CPT

## 2019-05-18 PROCEDURE — 74011250637 HC RX REV CODE- 250/637: Performed by: INTERNAL MEDICINE

## 2019-05-18 PROCEDURE — 74011250636 HC RX REV CODE- 250/636: Performed by: EMERGENCY MEDICINE

## 2019-05-18 PROCEDURE — 80048 BASIC METABOLIC PNL TOTAL CA: CPT

## 2019-05-18 PROCEDURE — 99152 MOD SED SAME PHYS/QHP 5/>YRS: CPT | Performed by: INTERNAL MEDICINE

## 2019-05-18 PROCEDURE — 74011636320 HC RX REV CODE- 636/320: Performed by: INTERNAL MEDICINE

## 2019-05-18 PROCEDURE — 77030013140 HC MSK NEB VYRM -A

## 2019-05-18 PROCEDURE — C1894 INTRO/SHEATH, NON-LASER: HCPCS | Performed by: INTERNAL MEDICINE

## 2019-05-18 RX ORDER — SODIUM CHLORIDE 0.9 % (FLUSH) 0.9 %
5-40 SYRINGE (ML) INJECTION EVERY 8 HOURS
Status: DISCONTINUED | OUTPATIENT
Start: 2019-05-18 | End: 2019-05-18 | Stop reason: SDUPTHER

## 2019-05-18 RX ORDER — ATORVASTATIN CALCIUM 20 MG/1
20 TABLET, FILM COATED ORAL
Status: DISCONTINUED | OUTPATIENT
Start: 2019-05-18 | End: 2019-05-18 | Stop reason: SDUPTHER

## 2019-05-18 RX ORDER — FENTANYL CITRATE 50 UG/ML
INJECTION, SOLUTION INTRAMUSCULAR; INTRAVENOUS AS NEEDED
Status: DISCONTINUED | OUTPATIENT
Start: 2019-05-18 | End: 2019-05-18 | Stop reason: HOSPADM

## 2019-05-18 RX ORDER — SODIUM CHLORIDE 9 MG/ML
75 INJECTION, SOLUTION INTRAVENOUS CONTINUOUS
Status: DISCONTINUED | OUTPATIENT
Start: 2019-05-18 | End: 2019-05-18 | Stop reason: SDUPTHER

## 2019-05-18 RX ORDER — POTASSIUM CHLORIDE 750 MG/1
40 TABLET, FILM COATED, EXTENDED RELEASE ORAL
Status: COMPLETED | OUTPATIENT
Start: 2019-05-18 | End: 2019-05-18

## 2019-05-18 RX ORDER — METOPROLOL TARTRATE 25 MG/1
25 TABLET, FILM COATED ORAL 2 TIMES DAILY
Status: DISCONTINUED | OUTPATIENT
Start: 2019-05-18 | End: 2019-05-19

## 2019-05-18 RX ORDER — SODIUM CHLORIDE 0.9 % (FLUSH) 0.9 %
5-40 SYRINGE (ML) INJECTION AS NEEDED
Status: DISCONTINUED | OUTPATIENT
Start: 2019-05-18 | End: 2019-05-18 | Stop reason: SDUPTHER

## 2019-05-18 RX ORDER — PANTOPRAZOLE SODIUM 40 MG/1
40 TABLET, DELAYED RELEASE ORAL
Status: DISCONTINUED | OUTPATIENT
Start: 2019-05-18 | End: 2019-05-21

## 2019-05-18 RX ORDER — ACETAMINOPHEN 325 MG/1
650 TABLET ORAL
Status: DISCONTINUED | OUTPATIENT
Start: 2019-05-18 | End: 2019-06-04 | Stop reason: HOSPADM

## 2019-05-18 RX ORDER — ONDANSETRON 2 MG/ML
4 INJECTION INTRAMUSCULAR; INTRAVENOUS
Status: ACTIVE | OUTPATIENT
Start: 2019-05-18 | End: 2019-05-19

## 2019-05-18 RX ORDER — LEVOFLOXACIN 5 MG/ML
500 INJECTION, SOLUTION INTRAVENOUS EVERY 24 HOURS
Status: DISCONTINUED | OUTPATIENT
Start: 2019-05-18 | End: 2019-05-18 | Stop reason: DRUGHIGH

## 2019-05-18 RX ORDER — SODIUM CHLORIDE 0.9 % (FLUSH) 0.9 %
5-40 SYRINGE (ML) INJECTION AS NEEDED
Status: DISCONTINUED | OUTPATIENT
Start: 2019-05-18 | End: 2019-06-04 | Stop reason: HOSPADM

## 2019-05-18 RX ORDER — OXYCODONE AND ACETAMINOPHEN 5; 325 MG/1; MG/1
1 TABLET ORAL
Status: DISCONTINUED | OUTPATIENT
Start: 2019-05-18 | End: 2019-06-04 | Stop reason: HOSPADM

## 2019-05-18 RX ORDER — SODIUM CHLORIDE 0.9 % (FLUSH) 0.9 %
5-40 SYRINGE (ML) INJECTION EVERY 8 HOURS
Status: DISCONTINUED | OUTPATIENT
Start: 2019-05-18 | End: 2019-06-04 | Stop reason: HOSPADM

## 2019-05-18 RX ORDER — DOXYCYCLINE HYCLATE 100 MG
100 TABLET ORAL 2 TIMES DAILY
Qty: 14 TAB | Refills: 0 | Status: SHIPPED | OUTPATIENT
Start: 2019-05-18 | End: 2019-06-04

## 2019-05-18 RX ORDER — ALBUTEROL SULFATE 0.83 MG/ML
2.5 SOLUTION RESPIRATORY (INHALATION)
Qty: 24 EACH | Refills: 0 | Status: SHIPPED | OUTPATIENT
Start: 2019-05-18 | End: 2019-06-04

## 2019-05-18 RX ORDER — ATORVASTATIN CALCIUM 20 MG/1
20 TABLET, FILM COATED ORAL
Status: DISCONTINUED | OUTPATIENT
Start: 2019-05-18 | End: 2019-05-21

## 2019-05-18 RX ORDER — MORPHINE SULFATE 4 MG/ML
4 INJECTION INTRAVENOUS
Status: DISCONTINUED | OUTPATIENT
Start: 2019-05-18 | End: 2019-05-30

## 2019-05-18 RX ORDER — MIDAZOLAM HYDROCHLORIDE 1 MG/ML
INJECTION, SOLUTION INTRAMUSCULAR; INTRAVENOUS AS NEEDED
Status: DISCONTINUED | OUTPATIENT
Start: 2019-05-18 | End: 2019-05-18 | Stop reason: HOSPADM

## 2019-05-18 RX ORDER — SODIUM CHLORIDE 9 MG/ML
100 INJECTION, SOLUTION INTRAVENOUS CONTINUOUS
Status: DISPENSED | OUTPATIENT
Start: 2019-05-18 | End: 2019-05-18

## 2019-05-18 RX ORDER — IBUPROFEN 200 MG
1 TABLET ORAL DAILY
Status: DISCONTINUED | OUTPATIENT
Start: 2019-05-19 | End: 2019-05-21

## 2019-05-18 RX ORDER — LIDOCAINE HYDROCHLORIDE 10 MG/ML
INJECTION INFILTRATION; PERINEURAL AS NEEDED
Status: DISCONTINUED | OUTPATIENT
Start: 2019-05-18 | End: 2019-05-18 | Stop reason: HOSPADM

## 2019-05-18 RX ORDER — ALBUTEROL SULFATE 0.83 MG/ML
5 SOLUTION RESPIRATORY (INHALATION)
Status: DISCONTINUED | OUTPATIENT
Start: 2019-05-18 | End: 2019-05-18

## 2019-05-18 RX ORDER — DOPAMINE HYDROCHLORIDE 320 MG/100ML
0-20 INJECTION, SOLUTION INTRAVENOUS
Status: DISCONTINUED | OUTPATIENT
Start: 2019-05-18 | End: 2019-05-22

## 2019-05-18 RX ORDER — ENOXAPARIN SODIUM 100 MG/ML
60 INJECTION SUBCUTANEOUS
Status: COMPLETED | OUTPATIENT
Start: 2019-05-18 | End: 2019-05-18

## 2019-05-18 RX ORDER — NITROGLYCERIN 0.4 MG/1
0.4 TABLET SUBLINGUAL
Status: DISCONTINUED | OUTPATIENT
Start: 2019-05-18 | End: 2019-06-04 | Stop reason: HOSPADM

## 2019-05-18 RX ORDER — LEVOFLOXACIN 5 MG/ML
500 INJECTION, SOLUTION INTRAVENOUS ONCE
Status: COMPLETED | OUTPATIENT
Start: 2019-05-18 | End: 2019-05-18

## 2019-05-18 RX ORDER — NALOXONE HYDROCHLORIDE 0.4 MG/ML
0.4 INJECTION, SOLUTION INTRAMUSCULAR; INTRAVENOUS; SUBCUTANEOUS AS NEEDED
Status: DISCONTINUED | OUTPATIENT
Start: 2019-05-18 | End: 2019-05-30

## 2019-05-18 RX ORDER — HEPARIN SODIUM 1000 [USP'U]/ML
INJECTION, SOLUTION INTRAVENOUS; SUBCUTANEOUS AS NEEDED
Status: DISCONTINUED | OUTPATIENT
Start: 2019-05-18 | End: 2019-05-18 | Stop reason: HOSPADM

## 2019-05-18 RX ORDER — ASPIRIN 325 MG
325 TABLET ORAL
Status: COMPLETED | OUTPATIENT
Start: 2019-05-18 | End: 2019-05-18

## 2019-05-18 RX ORDER — IPRATROPIUM BROMIDE 0.5 MG/2.5ML
0.5 SOLUTION RESPIRATORY (INHALATION)
Status: DISCONTINUED | OUTPATIENT
Start: 2019-05-18 | End: 2019-05-18

## 2019-05-18 RX ORDER — CLONAZEPAM 0.5 MG/1
0.5 TABLET ORAL
Status: DISCONTINUED | OUTPATIENT
Start: 2019-05-18 | End: 2019-05-22 | Stop reason: SDUPTHER

## 2019-05-18 RX ORDER — PREDNISONE 20 MG/1
40 TABLET ORAL DAILY
Qty: 10 TAB | Refills: 0 | Status: SHIPPED | OUTPATIENT
Start: 2019-05-18 | End: 2019-06-04

## 2019-05-18 RX ORDER — IPRATROPIUM BROMIDE AND ALBUTEROL SULFATE 2.5; .5 MG/3ML; MG/3ML
3 SOLUTION RESPIRATORY (INHALATION)
Status: DISCONTINUED | OUTPATIENT
Start: 2019-05-18 | End: 2019-05-20

## 2019-05-18 RX ORDER — ENOXAPARIN SODIUM 150 MG/ML
60 INJECTION SUBCUTANEOUS EVERY 12 HOURS
Status: CANCELLED | OUTPATIENT
Start: 2019-05-19

## 2019-05-18 RX ORDER — PREDNISONE 20 MG/1
60 TABLET ORAL
Status: DISCONTINUED | OUTPATIENT
Start: 2019-05-19 | End: 2019-05-18

## 2019-05-18 RX ORDER — SODIUM CHLORIDE 9 MG/ML
60 INJECTION, SOLUTION INTRAVENOUS CONTINUOUS
Status: DISCONTINUED | OUTPATIENT
Start: 2019-05-19 | End: 2019-05-23

## 2019-05-18 RX ORDER — LEVOFLOXACIN 5 MG/ML
250 INJECTION, SOLUTION INTRAVENOUS EVERY 24 HOURS
Status: DISCONTINUED | OUTPATIENT
Start: 2019-05-19 | End: 2019-05-21 | Stop reason: SDUPTHER

## 2019-05-18 RX ORDER — PREDNISONE 20 MG/1
60 TABLET ORAL
Status: COMPLETED | OUTPATIENT
Start: 2019-05-18 | End: 2019-05-18

## 2019-05-18 RX ORDER — GUAIFENESIN 600 MG/1
600 TABLET, EXTENDED RELEASE ORAL EVERY 12 HOURS
Status: DISCONTINUED | OUTPATIENT
Start: 2019-05-18 | End: 2019-05-21

## 2019-05-18 RX ORDER — ASPIRIN 325 MG
325 TABLET ORAL DAILY
Status: DISCONTINUED | OUTPATIENT
Start: 2019-05-19 | End: 2019-05-21

## 2019-05-18 RX ORDER — BUDESONIDE 0.25 MG/2ML
250 INHALANT ORAL
Status: DISCONTINUED | OUTPATIENT
Start: 2019-05-18 | End: 2019-06-04 | Stop reason: HOSPADM

## 2019-05-18 RX ORDER — METOPROLOL TARTRATE 5 MG/5ML
5 INJECTION INTRAVENOUS ONCE
Status: DISCONTINUED | OUTPATIENT
Start: 2019-05-18 | End: 2019-05-18 | Stop reason: ALTCHOICE

## 2019-05-18 RX ORDER — MORPHINE SULFATE 2 MG/ML
2 INJECTION, SOLUTION INTRAMUSCULAR; INTRAVENOUS
Status: COMPLETED | OUTPATIENT
Start: 2019-05-18 | End: 2019-05-18

## 2019-05-18 RX ORDER — AMITRIPTYLINE HYDROCHLORIDE 50 MG/1
100 TABLET, FILM COATED ORAL
Status: DISCONTINUED | OUTPATIENT
Start: 2019-05-18 | End: 2019-05-21

## 2019-05-18 RX ORDER — VERAPAMIL HYDROCHLORIDE 2.5 MG/ML
INJECTION, SOLUTION INTRAVENOUS AS NEEDED
Status: DISCONTINUED | OUTPATIENT
Start: 2019-05-18 | End: 2019-05-18 | Stop reason: HOSPADM

## 2019-05-18 RX ORDER — HEPARIN SODIUM 200 [USP'U]/100ML
INJECTION, SOLUTION INTRAVENOUS
Status: COMPLETED | OUTPATIENT
Start: 2019-05-18 | End: 2019-05-18

## 2019-05-18 RX ORDER — GABAPENTIN 400 MG/1
800 CAPSULE ORAL 4 TIMES DAILY
Status: DISCONTINUED | OUTPATIENT
Start: 2019-05-18 | End: 2019-05-20

## 2019-05-18 RX ADMIN — ALBUTEROL SULFATE 1 DOSE: 2.5 SOLUTION RESPIRATORY (INHALATION) at 12:42

## 2019-05-18 RX ADMIN — BUDESONIDE 250 MCG: 0.25 INHALANT RESPIRATORY (INHALATION) at 19:27

## 2019-05-18 RX ADMIN — ATORVASTATIN CALCIUM 20 MG: 20 TABLET, FILM COATED ORAL at 21:40

## 2019-05-18 RX ADMIN — PREDNISONE 60 MG: 20 TABLET ORAL at 12:01

## 2019-05-18 RX ADMIN — PANTOPRAZOLE SODIUM 40 MG: 40 TABLET, DELAYED RELEASE ORAL at 21:39

## 2019-05-18 RX ADMIN — ENOXAPARIN SODIUM 60 MG: 60 INJECTION SUBCUTANEOUS at 13:50

## 2019-05-18 RX ADMIN — ALBUTEROL SULFATE 1 DOSE: 2.5 SOLUTION RESPIRATORY (INHALATION) at 11:38

## 2019-05-18 RX ADMIN — DOPAMINE HYDROCHLORIDE IN DEXTROSE 5 MCG/KG/MIN: 3.2 INJECTION, SOLUTION INTRAVENOUS at 16:39

## 2019-05-18 RX ADMIN — IPRATROPIUM BROMIDE AND ALBUTEROL SULFATE 3 ML: .5; 3 SOLUTION RESPIRATORY (INHALATION) at 19:18

## 2019-05-18 RX ADMIN — SODIUM CHLORIDE 75 ML/HR: 900 INJECTION, SOLUTION INTRAVENOUS at 14:24

## 2019-05-18 RX ADMIN — LEVOFLOXACIN 500 MG: 5 INJECTION, SOLUTION INTRAVENOUS at 21:15

## 2019-05-18 RX ADMIN — IPRATROPIUM BROMIDE AND ALBUTEROL SULFATE 3 ML: .5; 3 SOLUTION RESPIRATORY (INHALATION) at 23:22

## 2019-05-18 RX ADMIN — SODIUM CHLORIDE 250 ML: 900 INJECTION, SOLUTION INTRAVENOUS at 14:24

## 2019-05-18 RX ADMIN — GABAPENTIN 800 MG: 400 CAPSULE ORAL at 21:40

## 2019-05-18 RX ADMIN — ASPIRIN 325 MG: 325 TABLET ORAL at 13:27

## 2019-05-18 RX ADMIN — POTASSIUM CHLORIDE 40 MEQ: 750 TABLET, EXTENDED RELEASE ORAL at 12:42

## 2019-05-18 RX ADMIN — GUAIFENESIN 600 MG: 600 TABLET, EXTENDED RELEASE ORAL at 21:39

## 2019-05-18 RX ADMIN — ALBUTEROL SULFATE 1 DOSE: 2.5 SOLUTION RESPIRATORY (INHALATION) at 12:10

## 2019-05-18 RX ADMIN — MORPHINE SULFATE 2 MG: 2 INJECTION, SOLUTION INTRAMUSCULAR; INTRAVENOUS at 13:28

## 2019-05-18 RX ADMIN — AMITRIPTYLINE HYDROCHLORIDE 100 MG: 50 TABLET, FILM COATED ORAL at 21:39

## 2019-05-18 RX ADMIN — OXYCODONE HYDROCHLORIDE AND ACETAMINOPHEN 1 TABLET: 5; 325 TABLET ORAL at 22:56

## 2019-05-18 NOTE — PROGRESS NOTES
1615- TRANSFER - IN REPORT:    Verbal report received from Tyrone Fleming (name) on Kalina Quinn  being received from [facility] (unit) for change in patient condition(Hypotension)      Report consisted of patients Situation, Background, Assessment and   Recommendations(SBAR). Information from the following report(s) SBAR, Procedure Summary, Intake/Output, MAR and Cardiac Rhythm NSR was reviewed with the receiving nurse. Opportunity for questions and clarification was provided. Assessment completed upon patients arrival to unit and care assumed. Cath Lab reports procedure ended through radial at 1520 without intervention and to start letting out 2ccs at 1620. Patient very restless. Oriented to self. Hypotensive. 1630- 2cc of air removed from TR Band. Site CDI- pulses palpable. 1645- 2cc of air removed from TR Band. Site CDI- pulses palpable. 1710- 2cc of air removed from TR Band. Site CDI- pulses palpable. 1740- 2cc of air removed from TR Band. Site CDI- pulses palpable. Dr. Melly Carmichael called into the unit for updates. Clarified with MD- MD states to keep MAP greater than 60 and run fluids at 100cc per hour then decrease to 60. MD stated for RN to hold metoprolol and give all other meds. 1815- 2cc of air removed from TR Band. Site CDI- pulses palpable. Patient resting in bed at this time. Will leave arm band on temporarily. 2000- Bedside and Verbal shift change report given to WADE RN (oncoming nurse) by Adal Veras RN (offgoing nurse). Report given with SBAR, Kardex, Intake/Output and MAR.

## 2019-05-18 NOTE — PROGRESS NOTES
Due to ongoing chest pain and low BP she cannot be treated medically  I give IV fluid, concerned about RV infarct with inferior wall  RV chamber appears small on echo so unlikely PE  I do not see pseudoaneurysm  There is small pericardial effusion antereriorly  Briefly while I spoke to Dr Miki Oppenheim interventionalist on call for my group, I noted wall motion abnormality along the septum and LV is dilated  I asked nurse supervisor to call cardiac cath lab team in

## 2019-05-18 NOTE — PROGRESS NOTES
1500: TRANSFER - IN REPORT:    Verbal report received from Fabiano RN(name) on Bronson Villanueva  being received from ED(unit) for ordered procedure      Report consisted of patients Situation, Background, Assessment and   Recommendations(SBAR). Information from the following report(s) ED Summary was reviewed with the receiving nurse. Opportunity for questions and clarification was provided. Assessment completed upon patients arrival to unit and care assumed. Cardiac Cath Lab Procedure Area Arrival Note:    Bronson Villanueva arrived to Cardiac Cath Lab, Procedure Area. Patient identifiers verified with NAME and DATE OF BIRTH. Procedure verified with patient. Consent forms verified. Allergies verified. Patient informed of procedure and plan of care. Questions answered with review. Patient voiced understanding of procedure and plan of care. Patient on cardiac monitor, non-invasive blood pressure, SPO2 monitor. On O2 @ 2 lpm via NC.  IV of NS on pump at 75 ml/hr. Patient status doing well with some problems : chest pain 4/10. Patient is A&Ox 4. Patient reports chest pain 4/10. Patient medicated during procedure with orders obtained and verified by Dr. Rangel Mejias. Refer to patients Cardiac Cath Lab PROCEDURE REPORT for vital signs, assessment, status, and response during procedure, printed at end of case. Printed report on chart or scanned into chart. 1555: TRANSFER - OUT REPORT:    Verbal report given to Glen Guerrier RN (name) on Bronson Villanueva  being transferred to [facility] (unit) for routine post - op       Report consisted of patients Situation, Background, Assessment and   Recommendations(SBAR). Information from the following report(s) SBAR, Procedure Summary and MAR was reviewed with the receiving nurse.     Lines:   Peripheral IV 05/18/19 Left Antecubital (Active)   Site Assessment Clean, dry, & intact 5/18/2019 11:39 AM   Phlebitis Assessment 0 5/18/2019 11:39 AM   Infiltration Assessment 0 5/18/2019 11:39 AM   Dressing Status Clean, dry, & intact 5/18/2019 11:39 AM   Dressing Type Transparent 5/18/2019 11:39 AM   Hub Color/Line Status Pink;Flushed;Patent 5/18/2019 11:39 AM   Action Taken Blood drawn 5/18/2019 11:39 AM       Peripheral IV 05/18/19 Right Antecubital (Active)   Site Assessment Clean, dry, & intact 5/18/2019  2:03 PM   Phlebitis Assessment 0 5/18/2019  2:03 PM   Infiltration Assessment 0 5/18/2019  2:03 PM   Dressing Status Clean, dry, & intact 5/18/2019  2:03 PM   Dressing Type Transparent 5/18/2019  2:03 PM   Hub Color/Line Status Green 5/18/2019  2:03 PM        Opportunity for questions and clarification was provided. Patient transported with:   Monitor  O2 @ 2 liters  Registered Nurse    1600: Arrived to [facility], pt's BP 74/51 then 70/44. Made Dr Tanya Stacy aware. Received telephone verbal orders for 250cc NS bolus. DR Tanya Stacy speaking with pt's son and daughter in law over the phone. 1605: BP 67/37. Let Dr Tanya Stacy aware. Received transfer orders to [facility] for Dopamine gtt. 1610: BP 83/51.     1615: Transferred pt to [facility]. BP 85/55. Gave report to Andrew Doshi RN in [facility].
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
## 2019-05-18 NOTE — ED NOTES
Bedside and verbal report given to Danielle Beaver, Cath Lab RN. Patient transported via stretcher with cardiac monitor by this RN and Cath Lab tech to Cath Lab. No sign of distress noted at time of transport.

## 2019-05-18 NOTE — ED TRIAGE NOTES
Triage note: Pt arrives with c/o increased SOB since last night. Pt respirations are labored. Hx COPD.

## 2019-05-18 NOTE — PROGRESS NOTES
1550 TRANSFER - IN REPORT:    Verbal report received from Fabiano, [facility] (name) on Sanjiv Chandler  being received from Cath Lab (unit) for routine progression of care      Report consisted of patients Situation, Background, Assessment and   Recommendations(SBAR). Information from the following report(s) SBAR, Kardex, Procedure Summary, MAR, Recent Results and Med Rec Status was reviewed with the receiving nurse. Opportunity for questions and clarification was provided. Assessment completed upon patients arrival to unit and care assumed. 1550 Patient arrived to the unit with TR band holding 10mL, CARLA Mario at bedside. Patients blood pressure 77/60. Cath Lab called Dr Ricky Bates. Patient's next blood pressure 70/58. Patient was sleepy but answered to name. Patient's son in room. Patient's next blood pressure 68/55. CARLA Mario received order for transfer. 1610 TRANSFER - OUT REPORT:    Verbal report given to CARLA Cancino (name) on Sanjiv Chandler  being transferred to CVICU (unit) for routine progression of care       Report consisted of patients Situation, Background, Assessment and   Recommendations(SBAR). Information from the following report(s) SBAR, Kardex, Procedure Summary and MAR was reviewed with the receiving nurse.     Lines:   Peripheral IV 05/18/19 Left Antecubital (Active)   Site Assessment Clean, dry, & intact 5/18/2019 11:39 AM   Phlebitis Assessment 0 5/18/2019 11:39 AM   Infiltration Assessment 0 5/18/2019 11:39 AM   Dressing Status Clean, dry, & intact 5/18/2019 11:39 AM   Dressing Type Transparent 5/18/2019 11:39 AM   Hub Color/Line Status Pink;Flushed;Patent 5/18/2019 11:39 AM   Action Taken Blood drawn 5/18/2019 11:39 AM       Peripheral IV 05/18/19 Right Antecubital (Active)   Site Assessment Clean, dry, & intact 5/18/2019  2:03 PM   Phlebitis Assessment 0 5/18/2019  2:03 PM   Infiltration Assessment 0 5/18/2019  2:03 PM   Dressing Status Clean, dry, & intact 5/18/2019  2:03 PM Dressing Type Transparent 5/18/2019  2:03 PM   Hub Color/Line Status Green 5/18/2019  2:03 PM        Opportunity for questions and clarification was provided.       Patient transported with:   Monitor  Registered Nurse

## 2019-05-18 NOTE — PROGRESS NOTES
Pharmacy Note - Re:  Renal Adjustment of Levofloxacin    Day #1 of Levofloxacin  Indication: URI  Current regimen: 500mg IV q24hr x 5 days  Abx regimen: levofloxacin only  Recent Labs     19  1134   WBC 9.7   CREA 1.28*   BUN 21*     Est CrCl: 42.4 ml/min; UO: not  yet documented (new admission)  Temp (24hrs), Av.4 °F (36.9 °C), Min:98.2 °F (36.8 °C), Max:98.6 °F (37 °C)    Cultures: none    Plan: Change to 500mg IV x1, then 250mg IV q24hr x 4 days for CrCl 20-49 ml/min    Savannah Pierre, PharmD

## 2019-05-18 NOTE — Clinical Note
Sheath #1: Dressed using bacteriostatic and transparent dressing. Site: clean, dry, & intact, no bleeding and no hematoma.
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
## 2019-05-19 LAB
ANION GAP SERPL CALC-SCNC: 7 MMOL/L (ref 5–15)
ATRIAL RATE: 100 BPM
ATRIAL RATE: 91 BPM
ATRIAL RATE: 93 BPM
BUN SERPL-MCNC: 18 MG/DL (ref 6–20)
BUN/CREAT SERPL: 14 (ref 12–20)
CALCIUM SERPL-MCNC: 8.7 MG/DL (ref 8.5–10.1)
CALCULATED P AXIS, ECG09: 80 DEGREES
CALCULATED P AXIS, ECG09: 80 DEGREES
CALCULATED P AXIS, ECG09: 81 DEGREES
CALCULATED R AXIS, ECG10: -56 DEGREES
CALCULATED R AXIS, ECG10: -66 DEGREES
CALCULATED R AXIS, ECG10: -67 DEGREES
CALCULATED T AXIS, ECG11: -108 DEGREES
CALCULATED T AXIS, ECG11: -112 DEGREES
CALCULATED T AXIS, ECG11: -115 DEGREES
CHLORIDE SERPL-SCNC: 105 MMOL/L (ref 97–108)
CHOLEST SERPL-MCNC: 139 MG/DL
CO2 SERPL-SCNC: 27 MMOL/L (ref 21–32)
CREAT SERPL-MCNC: 1.27 MG/DL (ref 0.55–1.02)
DIAGNOSIS, 93000: NORMAL
ERYTHROCYTE [DISTWIDTH] IN BLOOD BY AUTOMATED COUNT: 12.9 % (ref 11.5–14.5)
EST. AVERAGE GLUCOSE BLD GHB EST-MCNC: 137 MG/DL
GLUCOSE BLD STRIP.AUTO-MCNC: 120 MG/DL (ref 65–100)
GLUCOSE BLD STRIP.AUTO-MCNC: 156 MG/DL (ref 65–100)
GLUCOSE BLD STRIP.AUTO-MCNC: 162 MG/DL (ref 65–100)
GLUCOSE BLD STRIP.AUTO-MCNC: 189 MG/DL (ref 65–100)
GLUCOSE SERPL-MCNC: 173 MG/DL (ref 65–100)
HBA1C MFR BLD: 6.4 % (ref 4.2–6.3)
HCT VFR BLD AUTO: 37.2 % (ref 35–47)
HDLC SERPL-MCNC: 46 MG/DL
HDLC SERPL: 3 {RATIO} (ref 0–5)
HGB BLD-MCNC: 12 G/DL (ref 11.5–16)
INR PPP: 1.2 (ref 0.9–1.1)
LDLC SERPL CALC-MCNC: 77.6 MG/DL (ref 0–100)
LIPID PROFILE,FLP: NORMAL
MCH RBC QN AUTO: 28.9 PG (ref 26–34)
MCHC RBC AUTO-ENTMCNC: 32.3 G/DL (ref 30–36.5)
MCV RBC AUTO: 89.6 FL (ref 80–99)
NRBC # BLD: 0 K/UL (ref 0–0.01)
NRBC BLD-RTO: 0 PER 100 WBC
P-R INTERVAL, ECG05: 116 MS
P-R INTERVAL, ECG05: 122 MS
P-R INTERVAL, ECG05: 124 MS
PLATELET # BLD AUTO: 310 K/UL (ref 150–400)
PMV BLD AUTO: 9.3 FL (ref 8.9–12.9)
POTASSIUM SERPL-SCNC: 3.6 MMOL/L (ref 3.5–5.1)
PROTHROMBIN TIME: 11.9 SEC (ref 9–11.1)
Q-T INTERVAL, ECG07: 410 MS
Q-T INTERVAL, ECG07: 426 MS
Q-T INTERVAL, ECG07: 482 MS
QRS DURATION, ECG06: 76 MS
QRS DURATION, ECG06: 78 MS
QRS DURATION, ECG06: 90 MS
QTC CALCULATION (BEZET), ECG08: 523 MS
QTC CALCULATION (BEZET), ECG08: 528 MS
QTC CALCULATION (BEZET), ECG08: 599 MS
RBC # BLD AUTO: 4.15 M/UL (ref 3.8–5.2)
SERVICE CMNT-IMP: ABNORMAL
SODIUM SERPL-SCNC: 139 MMOL/L (ref 136–145)
TRIGL SERPL-MCNC: 77 MG/DL (ref ?–150)
TROPONIN I SERPL-MCNC: 2.99 NG/ML
VENTRICULAR RATE, ECG03: 100 BPM
VENTRICULAR RATE, ECG03: 91 BPM
VENTRICULAR RATE, ECG03: 93 BPM
VLDLC SERPL CALC-MCNC: 15.4 MG/DL
WBC # BLD AUTO: 9.1 K/UL (ref 3.6–11)

## 2019-05-19 PROCEDURE — 36415 COLL VENOUS BLD VENIPUNCTURE: CPT

## 2019-05-19 PROCEDURE — 94640 AIRWAY INHALATION TREATMENT: CPT

## 2019-05-19 PROCEDURE — 74011250636 HC RX REV CODE- 250/636: Performed by: INTERNAL MEDICINE

## 2019-05-19 PROCEDURE — 85027 COMPLETE CBC AUTOMATED: CPT

## 2019-05-19 PROCEDURE — 74011000250 HC RX REV CODE- 250: Performed by: HOSPITALIST

## 2019-05-19 PROCEDURE — 74011250637 HC RX REV CODE- 250/637: Performed by: HOSPITALIST

## 2019-05-19 PROCEDURE — 80061 LIPID PANEL: CPT

## 2019-05-19 PROCEDURE — 94760 N-INVAS EAR/PLS OXIMETRY 1: CPT

## 2019-05-19 PROCEDURE — 65610000003 HC RM ICU SURGICAL

## 2019-05-19 PROCEDURE — 74011250637 HC RX REV CODE- 250/637: Performed by: INTERNAL MEDICINE

## 2019-05-19 PROCEDURE — 85610 PROTHROMBIN TIME: CPT

## 2019-05-19 PROCEDURE — 74011636637 HC RX REV CODE- 636/637: Performed by: NURSE PRACTITIONER

## 2019-05-19 PROCEDURE — 80048 BASIC METABOLIC PNL TOTAL CA: CPT

## 2019-05-19 PROCEDURE — 83036 HEMOGLOBIN GLYCOSYLATED A1C: CPT

## 2019-05-19 PROCEDURE — 84484 ASSAY OF TROPONIN QUANT: CPT

## 2019-05-19 PROCEDURE — 74011250636 HC RX REV CODE- 250/636: Performed by: HOSPITALIST

## 2019-05-19 PROCEDURE — 82962 GLUCOSE BLOOD TEST: CPT

## 2019-05-19 RX ORDER — INSULIN LISPRO 100 [IU]/ML
INJECTION, SOLUTION INTRAVENOUS; SUBCUTANEOUS
Status: DISCONTINUED | OUTPATIENT
Start: 2019-05-19 | End: 2019-05-21

## 2019-05-19 RX ORDER — HEPARIN SODIUM 5000 [USP'U]/ML
5000 INJECTION, SOLUTION INTRAVENOUS; SUBCUTANEOUS EVERY 8 HOURS
Status: DISCONTINUED | OUTPATIENT
Start: 2019-05-19 | End: 2019-05-21

## 2019-05-19 RX ORDER — MAGNESIUM SULFATE 100 %
4 CRYSTALS MISCELLANEOUS AS NEEDED
Status: DISCONTINUED | OUTPATIENT
Start: 2019-05-19 | End: 2019-06-03 | Stop reason: SDUPTHER

## 2019-05-19 RX ORDER — DEXTROSE 50 % IN WATER (D50W) INTRAVENOUS SYRINGE
25-50 AS NEEDED
Status: DISCONTINUED | OUTPATIENT
Start: 2019-05-19 | End: 2019-06-03 | Stop reason: SDUPTHER

## 2019-05-19 RX ADMIN — HEPARIN SODIUM 5000 UNITS: 5000 INJECTION INTRAVENOUS; SUBCUTANEOUS at 12:29

## 2019-05-19 RX ADMIN — ASPIRIN 325 MG: 325 TABLET ORAL at 08:48

## 2019-05-19 RX ADMIN — GABAPENTIN 800 MG: 400 CAPSULE ORAL at 21:03

## 2019-05-19 RX ADMIN — CLONAZEPAM 0.5 MG: 0.5 TABLET ORAL at 16:32

## 2019-05-19 RX ADMIN — BUDESONIDE 250 MCG: 0.25 INHALANT RESPIRATORY (INHALATION) at 19:16

## 2019-05-19 RX ADMIN — OXYCODONE HYDROCHLORIDE AND ACETAMINOPHEN 1 TABLET: 5; 325 TABLET ORAL at 13:48

## 2019-05-19 RX ADMIN — GABAPENTIN 800 MG: 400 CAPSULE ORAL at 18:23

## 2019-05-19 RX ADMIN — Medication 10 ML: at 07:32

## 2019-05-19 RX ADMIN — INSULIN LISPRO 2 UNITS: 100 INJECTION, SOLUTION INTRAVENOUS; SUBCUTANEOUS at 08:15

## 2019-05-19 RX ADMIN — GUAIFENESIN 600 MG: 600 TABLET, EXTENDED RELEASE ORAL at 21:01

## 2019-05-19 RX ADMIN — OXYCODONE HYDROCHLORIDE AND ACETAMINOPHEN 1 TABLET: 5; 325 TABLET ORAL at 21:01

## 2019-05-19 RX ADMIN — PANTOPRAZOLE SODIUM 40 MG: 40 TABLET, DELAYED RELEASE ORAL at 16:32

## 2019-05-19 RX ADMIN — IPRATROPIUM BROMIDE AND ALBUTEROL SULFATE 3 ML: .5; 3 SOLUTION RESPIRATORY (INHALATION) at 11:22

## 2019-05-19 RX ADMIN — BUDESONIDE 250 MCG: 0.25 INHALANT RESPIRATORY (INHALATION) at 07:02

## 2019-05-19 RX ADMIN — IPRATROPIUM BROMIDE AND ALBUTEROL SULFATE 3 ML: .5; 3 SOLUTION RESPIRATORY (INHALATION) at 04:22

## 2019-05-19 RX ADMIN — IPRATROPIUM BROMIDE AND ALBUTEROL SULFATE 3 ML: .5; 3 SOLUTION RESPIRATORY (INHALATION) at 15:02

## 2019-05-19 RX ADMIN — DOPAMINE HYDROCHLORIDE IN DEXTROSE 4 MCG/KG/MIN: 3.2 INJECTION, SOLUTION INTRAVENOUS at 16:58

## 2019-05-19 RX ADMIN — GUAIFENESIN 600 MG: 600 TABLET, EXTENDED RELEASE ORAL at 08:48

## 2019-05-19 RX ADMIN — INSULIN LISPRO 2 UNITS: 100 INJECTION, SOLUTION INTRAVENOUS; SUBCUTANEOUS at 13:18

## 2019-05-19 RX ADMIN — GABAPENTIN 800 MG: 400 CAPSULE ORAL at 13:50

## 2019-05-19 RX ADMIN — LEVOFLOXACIN 250 MG: 5 INJECTION, SOLUTION INTRAVENOUS at 19:10

## 2019-05-19 RX ADMIN — PANTOPRAZOLE SODIUM 40 MG: 40 TABLET, DELAYED RELEASE ORAL at 07:32

## 2019-05-19 RX ADMIN — GABAPENTIN 800 MG: 400 CAPSULE ORAL at 08:51

## 2019-05-19 RX ADMIN — HEPARIN SODIUM 5000 UNITS: 5000 INJECTION INTRAVENOUS; SUBCUTANEOUS at 18:21

## 2019-05-19 RX ADMIN — SODIUM CHLORIDE 60 ML/HR: 900 INJECTION, SOLUTION INTRAVENOUS at 07:59

## 2019-05-19 RX ADMIN — Medication 10 ML: at 21:01

## 2019-05-19 RX ADMIN — ATORVASTATIN CALCIUM 20 MG: 20 TABLET, FILM COATED ORAL at 21:01

## 2019-05-19 RX ADMIN — IPRATROPIUM BROMIDE AND ALBUTEROL SULFATE 3 ML: .5; 3 SOLUTION RESPIRATORY (INHALATION) at 19:15

## 2019-05-19 RX ADMIN — IPRATROPIUM BROMIDE AND ALBUTEROL SULFATE 3 ML: .5; 3 SOLUTION RESPIRATORY (INHALATION) at 07:01

## 2019-05-19 RX ADMIN — AMITRIPTYLINE HYDROCHLORIDE 100 MG: 50 TABLET, FILM COATED ORAL at 21:01

## 2019-05-19 NOTE — PROGRESS NOTES
Hospitalist Progress Note  Nevaeh Rodríguez MD  Answering service: [phone] from in house phone        Date of Service:  2019  NAME:  Jerome Freed  :  1960  MRN:  [id number]  PCP: Aryan Prather NP    Chief Complaint:   Chief Complaint   Patient presents with    Shortness of Breath         Admission Summary:     Jerome Freed is a 66 y.o. female who presented with SOB    Interval history / Subjective:   Patient seen for Follow up of NSTEMI  First encounter  Patient seen and examined by the bedside  Labs, images and notes reviewed  Patient is comfortable, Denies any chest pain' No fevers or chills ,No nausea or vomiting  SOB is much improved, reports a lot of social stress, denies sick contacts. Discussed with nursing staff, no acute issues overnight, orders reviewed. Assessment & Plan:     - NSTEMI, POA  S/p cardiac cath: Mild to Mod CAD, diagnostic, low EF < 25% with severe hypokinesis  On ASA, Lopressors with holding parameters, statins    - Cardiogenic shock  Improving  IVF for now  On Dopamine gtt, cards to manage    - Takotsubo cardiomyopathy  As per cardiac cath, POA  Management as above    - COPD exacerbation  Improving, cont steroids, nebs RTC, Mucinex, inhaled steroids, Levaquin  CT chest reviewed.  Pt should get annual lose dose screening chest CT    - Hyperglycemia  Likely diabetic as patient has history of gestational Diabetes in the setting of steroids  HbA1c pending  Cont SSI, accu-checks TID AC, diabetic diet    -Hypokalemia: resolved, repeat bmp in am  -GERD: PPI  -Active smoker: cont on Nicotine patch  -CKD 3:Stable, repeat bmp in am, avoid nephrotoxins, renal dosing of meds  -chronic anxiety: cont home meds  -Opiate use, chronic: percocet PRN    Code status: Full Code    DVT prophylaxis: Heparin SQ    Care Plan discussed with: Patient/Family and Nurse    Disposition: TBD    Hospital Problems  Date Reviewed: 2011          Codes Class Noted POA    NSTEMI (non-ST elevated myocardial infarction) [facility]) ICD-10-CM: I21.4  ICD-9-CM: 410.70  2019 Unknown                Review of Systems:   A comprehensive review of systems was negative. Physical Examination:       General appearance: alert, no distress, appears stated age  Throat: normal findings: buccal mucosa normal  Lungs: decreased AE through out with distant breath sounds, no wheezing, no labored breathing  Heart: regular rate and rhythm  Abdomen: soft, non-tender. Bowel sounds normal. No masses,  no organomegaly  Extremities: extremities normal, atraumatic, no cyanosis or edema  Pulses: 2+ and symmetric  Neurologic: Alert and oriented X 3, normal strength and tone. Vital Signs:    Last 24hrs VS reviewed since prior progress note. Most recent are:    Visit Vitals  BP (!) 122/110 (BP 1 Location: Left arm, BP Patient Position: At rest)   Pulse 90   Temp 98 °F (36.7 °C)   Resp 18   Ht 5' 4\" (1.626 m)   Wt 55.4 kg (122 lb 1.6 oz)   SpO2 95%   BMI 20.96 kg/m²         Intake/Output Summary (Last 24 hours) at 2019 0923  Last data filed at 2019 0837  Gross per 24 hour   Intake 2080. 3 ml   Output 1800 ml   Net 280.3 ml        Tmax:  Temp (24hrs), Av.1 °F (36.7 °C), Min:97.7 °F (36.5 °C), Max:98.6 °F (37 °C)      Data Review:     Ct Chest Wo Cont    Result Date: 2019  CT CHEST WITHOUT CONTRAST. 2019 2:35 PM INDICATION: Dyspnea, cough. COMPARISON: CT abdomen pelvis 10/11/2017. TECHNIQUE: CT of the chest was performed without contrast. Coronal and sagittal reconstructions were performed. CT dose reduction was achieved through use of a standardized protocol tailored for this examination and automatic exposure control for dose modulation. Adaptive statistical iterative reconstruction (ASIR) was utilized. FINDINGS: Panlobular emphysema in the upper lobes is mild. Mild endobronchial mucous plugging in the basilar segments of the right lobe is similar to 2017.  A sub-6 mm, subpleural nodule in the anterior segment of the right middle lobe (3-36) is fat density. Minimal linear scarring is associated in the lung immediately deep to it. The central airways are patent. No pneumothorax or pleural effusion. The heart size is normal. Left anterior descending coronary calcifications are moderate. No thoracic lymphadenopathy. Post cholecystectomy. Incidental note is made of right renal cysts. IMPRESSION: 1. Mild right lower lobe mucus plugging. 2. Mild emphysema. 3. Annual low dose chest CT is recommended for lung carcinoma screening if the patient meets criteria. Criteria for lung carcinoma screening with low-dose CT: 3 46-80 years old. 2. No signs or symptoms of lung carcinoma. 3. 30 pack-year or greater smoking history. 4. Current smoker or quit within the last 15 years. 5. Written order from a health professional following lung cancer screening counseling that attests to shared decision-making having taken place before their first screening CT. www.acr.org/Quality-Safety/Resources/LungRADS www. [employer]prevent[employer]ervicestaskforce.org/page/document/updatesummaryfinal/lung-ca cer-screening     Xr Chest Port    Result Date: 5/18/2019  PORTABLE CHEST RADIOGRAPH/S: 5/18/2019 11:54 AM INDICATION: Pneumonia, COPD, dyspnea. COMPARISON: 9/10/2015, 6/9/2013, 2/15/2006. TECHNIQUE: Portable frontal upright radiograph/s of the chest. FINDINGS: The lungs are clear. The central airways are patent. No pneumothorax or pleural effusion. IMPRESSION: Clear lungs.     Lab Results   Component Value Date/Time    Specimen Description: URINE 04/10/2014 11:31 PM     Lab Results   Component Value Date/Time    Culture result: MIXED UROGENITAL ALAN ISOLATED 04/10/2014 11:31 PM     All Micro Results     None          Labs:     Recent Labs     05/19/19  0811 05/18/19  1134   WBC 9.1 9.7   HGB 12.0 13.2   HCT 37.2 40.8    351     Recent Labs     05/19/19  0811 05/18/19  1134    135*   K 3.6 3.1*    98   CO2 27 31 BUN 18 21*   CREA 1.27* 1.28*   * 179*   CA 8.7 9.0     No results for input(s): SGOT, GPT, ALT, AP, TBIL, TBILI, TP, ALB, GLOB, GGT, AML, LPSE in the last 72 hours. No lab exists for component: AMYP, HLPSE  Recent Labs     05/19/19  0811   INR 1.2*   PTP 11.9*      No results for input(s): FE, TIBC, PSAT, FERR in the last 72 hours. No results found for: FOL, RBCF   No results for input(s): PH, PCO2, PO2 in the last 72 hours.   Recent Labs     05/19/19  0811 05/18/19 2012 05/18/19  1134   TROIQ 2.99* 4.62* 11.90*     Lab Results   Component Value Date/Time    Cholesterol, total 139 05/19/2019 08:11 AM    HDL Cholesterol 46 05/19/2019 08:11 AM    LDL, calculated 77.6 05/19/2019 08:11 AM    Triglyceride 77 05/19/2019 08:11 AM    CHOL/HDL Ratio 3.0 05/19/2019 08:11 AM     Lab Results   Component Value Date/Time    Glucose (POC) 189 (H) 05/19/2019 08:09 AM    Glucose (POC) 339 (H) 05/18/2019 09:57 PM    Glucose (POC) 100 08/25/2011 01:58 PM     Lab Results   Component Value Date/Time    Color YELLOW/STRAW 10/11/2017 09:19 PM    Appearance CLEAR 10/11/2017 09:19 PM    Specific gravity 1.016 10/11/2017 09:19 PM    pH (UA) 7.0 10/11/2017 09:19 PM    Protein NEGATIVE  10/11/2017 09:19 PM    Glucose NEGATIVE  10/11/2017 09:19 PM    Ketone NEGATIVE  10/11/2017 09:19 PM    Bilirubin NEGATIVE  10/11/2017 09:19 PM    Urobilinogen 1.0 10/11/2017 09:19 PM    Nitrites NEGATIVE  10/11/2017 09:19 PM    Leukocyte Esterase NEGATIVE  10/11/2017 09:19 PM    Epithelial cells FEW 10/11/2017 09:19 PM    Bacteria NEGATIVE  10/11/2017 09:19 PM    WBC 0-4 10/11/2017 09:19 PM    RBC 5-10 10/11/2017 09:19 PM         Medications Reviewed:     Current Facility-Administered Medications   Medication Dose Route Frequency    glucose chewable tablet 16 g  4 Tab Oral PRN    dextrose (D50W) injection syrg 12.5-25 g  25-50 mL IntraVENous PRN    glucagon (GLUCAGEN) injection 1 mg  1 mg IntraMUSCular PRN    insulin lispro (HUMALOG) injection   SubCUTAneous AC&HS    aspirin tablet 325 mg  325 mg Oral DAILY    metoprolol tartrate (LOPRESSOR) tablet 25 mg  25 mg Oral BID    amitriptyline (ELAVIL) tablet 100 mg  100 mg Oral QHS    gabapentin (NEURONTIN) capsule 800 mg  800 mg Oral QID    clonazePAM (KlonoPIN) tablet 0.5 mg  0.5 mg Oral Q12H PRN    albuterol-ipratropium (DUO-NEB) 2.5 MG-0.5 MG/3 ML  3 mL Nebulization Q4H RT    sodium chloride (NS) flush 5-40 mL  5-40 mL IntraVENous Q8H    sodium chloride (NS) flush 5-40 mL  5-40 mL IntraVENous PRN    nitroglycerin (NITROSTAT) tablet 0.4 mg  0.4 mg SubLINGual Q5MIN PRN    naloxone (NARCAN) injection 0.4 mg  0.4 mg IntraVENous PRN    oxyCODONE-acetaminophen (PERCOCET) 5-325 mg per tablet 1 Tab  1 Tab Oral Q4H PRN    morphine injection 4 mg  4 mg IntraVENous Q4H PRN    pantoprazole (PROTONIX) tablet 40 mg  40 mg Oral ACB&D    budesonide (PULMICORT) 250 mcg/2ml nebulizer susp  250 mcg Nebulization BID RT    nicotine (NICODERM CQ) 14 mg/24 hr patch 1 Patch  1 Patch TransDERmal DAILY    atorvastatin (LIPITOR) tablet 20 mg  20 mg Oral QHS    acetaminophen (TYLENOL) tablet 650 mg  650 mg Oral Q4H PRN    ondansetron (ZOFRAN) injection 4 mg  4 mg IntraVENous ONCE PRN    DOPamine (INTROPIN) 800 mg in dextrose 5% 250 mL infusion  0-20 mcg/kg/min IntraVENous TITRATE    guaiFENesin ER (MUCINEX) tablet 600 mg  600 mg Oral Q12H    levoFLOXacin (LEVAQUIN) 250 mg in D5W IVPB  250 mg IntraVENous Q24H    0.9% sodium chloride infusion  60 mL/hr IntraVENous CONTINUOUS     ______________________________________________________________________  EXPECTED LENGTH OF STAY: - - -  ACTUAL LENGTH OF STAY:          1                 Caleb Carlton MD
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
## 2019-05-19 NOTE — PROGRESS NOTES
2000 - Bedside and Verbal shift change report given to me (oncoming nurse) by Tete Allan RN (offgoing nurse). Report included the following information SBAR, Kardex, Procedure Summary, Intake/Output, MAR, Recent Results, Med Rec Status and Cardiac Rhythm SR, inverted T waves and global q waves. . PT denies chest pain, SOB. 12 lead EKG repeated and lab work drawn. Pt assisted up to BR and voided. 2030 - sitting in chair eating dinner with family at bedside. 2300 - pt medicated for back pain w/ percocet & relieved. 0000 - spoke with NP about elevated BS. New orders received for AC&HS accuchecks and SSI to start in the am.    0200 - pt sleeping in bed.    0700 - pt up to chair, CHG completed, teeth brushed and all linen changed. Denies c/o pain, SOB. SBP now > 100 but MAP 60-65.    0800 - am labs sent now. Bedside and Verbal shift change report given to Cordelia Andrews RN (oncoming nurse) by me (offgoing nurse).  Report included the following information SBAR, Kardex, Procedure Summary, Intake/Output, MAR, Recent Results, Med Rec Status and Cardiac Rhythm SR.

## 2019-05-19 NOTE — PROGRESS NOTES
Rec'd call from RN regarding BS of 339 from 2157 last night. Pt without known hx of DM. A1C already ordered for AM and pt placed on CC diet on admission. Chart reviews shows 5 day course of prednisone started 5/18/19. Unclear if pt is on chronically or has been on this recently. Will start AC/HS accuchecks in AM. Will not cover sugar of 339 as pt is not eating at the moment, is sleeping and this sugar was taken several hours ago.

## 2019-05-20 LAB
ANION GAP SERPL CALC-SCNC: 8 MMOL/L (ref 5–15)
BASOPHILS # BLD: 0.1 K/UL (ref 0–0.1)
BASOPHILS NFR BLD: 1 % (ref 0–1)
BUN SERPL-MCNC: 19 MG/DL (ref 6–20)
BUN/CREAT SERPL: 14 (ref 12–20)
CALCIUM SERPL-MCNC: 8.3 MG/DL (ref 8.5–10.1)
CHLORIDE SERPL-SCNC: 107 MMOL/L (ref 97–108)
CO2 SERPL-SCNC: 27 MMOL/L (ref 21–32)
CREAT SERPL-MCNC: 1.35 MG/DL (ref 0.55–1.02)
DIFFERENTIAL METHOD BLD: ABNORMAL
EOSINOPHIL # BLD: 0 K/UL (ref 0–0.4)
EOSINOPHIL NFR BLD: 0 % (ref 0–7)
ERYTHROCYTE [DISTWIDTH] IN BLOOD BY AUTOMATED COUNT: 13.2 % (ref 11.5–14.5)
GLUCOSE BLD STRIP.AUTO-MCNC: 127 MG/DL (ref 65–100)
GLUCOSE BLD STRIP.AUTO-MCNC: 133 MG/DL (ref 65–100)
GLUCOSE BLD STRIP.AUTO-MCNC: 143 MG/DL (ref 65–100)
GLUCOSE BLD STRIP.AUTO-MCNC: 165 MG/DL (ref 65–100)
GLUCOSE SERPL-MCNC: 122 MG/DL (ref 65–100)
HCT VFR BLD AUTO: 37 % (ref 35–47)
HGB BLD-MCNC: 11.5 G/DL (ref 11.5–16)
IMM GRANULOCYTES # BLD AUTO: 0.1 K/UL (ref 0–0.04)
IMM GRANULOCYTES NFR BLD AUTO: 0 % (ref 0–0.5)
LYMPHOCYTES # BLD: 2.3 K/UL (ref 0.8–3.5)
LYMPHOCYTES NFR BLD: 18 % (ref 12–49)
MCH RBC QN AUTO: 28.6 PG (ref 26–34)
MCHC RBC AUTO-ENTMCNC: 31.1 G/DL (ref 30–36.5)
MCV RBC AUTO: 92 FL (ref 80–99)
MONOCYTES # BLD: 0.5 K/UL (ref 0–1)
MONOCYTES NFR BLD: 4 % (ref 5–13)
NEUTS SEG # BLD: 9.7 K/UL (ref 1.8–8)
NEUTS SEG NFR BLD: 77 % (ref 32–75)
NRBC # BLD: 0 K/UL (ref 0–0.01)
NRBC BLD-RTO: 0 PER 100 WBC
PLATELET # BLD AUTO: 284 K/UL (ref 150–400)
PMV BLD AUTO: 9.6 FL (ref 8.9–12.9)
POTASSIUM SERPL-SCNC: 3.4 MMOL/L (ref 3.5–5.1)
RBC # BLD AUTO: 4.02 M/UL (ref 3.8–5.2)
SERVICE CMNT-IMP: ABNORMAL
SODIUM SERPL-SCNC: 142 MMOL/L (ref 136–145)
WBC # BLD AUTO: 12.6 K/UL (ref 3.6–11)

## 2019-05-20 PROCEDURE — 65610000003 HC RM ICU SURGICAL

## 2019-05-20 PROCEDURE — 74011250636 HC RX REV CODE- 250/636: Performed by: INTERNAL MEDICINE

## 2019-05-20 PROCEDURE — 74011636637 HC RX REV CODE- 636/637: Performed by: NURSE PRACTITIONER

## 2019-05-20 PROCEDURE — 80048 BASIC METABOLIC PNL TOTAL CA: CPT

## 2019-05-20 PROCEDURE — 94760 N-INVAS EAR/PLS OXIMETRY 1: CPT

## 2019-05-20 PROCEDURE — 74011250637 HC RX REV CODE- 250/637: Performed by: HOSPITALIST

## 2019-05-20 PROCEDURE — 74011250637 HC RX REV CODE- 250/637: Performed by: INTERNAL MEDICINE

## 2019-05-20 PROCEDURE — 77030027138 HC INCENT SPIROMETER -A

## 2019-05-20 PROCEDURE — 94640 AIRWAY INHALATION TREATMENT: CPT

## 2019-05-20 PROCEDURE — 74011250636 HC RX REV CODE- 250/636: Performed by: HOSPITALIST

## 2019-05-20 PROCEDURE — 36415 COLL VENOUS BLD VENIPUNCTURE: CPT

## 2019-05-20 PROCEDURE — 85025 COMPLETE CBC W/AUTO DIFF WBC: CPT

## 2019-05-20 PROCEDURE — 74011000250 HC RX REV CODE- 250: Performed by: HOSPITALIST

## 2019-05-20 PROCEDURE — 82962 GLUCOSE BLOOD TEST: CPT

## 2019-05-20 PROCEDURE — 77010033678 HC OXYGEN DAILY

## 2019-05-20 PROCEDURE — 74011000250 HC RX REV CODE- 250: Performed by: INTERNAL MEDICINE

## 2019-05-20 RX ORDER — POTASSIUM CHLORIDE 750 MG/1
40 TABLET, FILM COATED, EXTENDED RELEASE ORAL 2 TIMES DAILY
Status: COMPLETED | OUTPATIENT
Start: 2019-05-20 | End: 2019-05-20

## 2019-05-20 RX ORDER — IPRATROPIUM BROMIDE AND ALBUTEROL SULFATE 2.5; .5 MG/3ML; MG/3ML
3 SOLUTION RESPIRATORY (INHALATION)
Status: DISCONTINUED | OUTPATIENT
Start: 2019-05-20 | End: 2019-05-30

## 2019-05-20 RX ADMIN — GUAIFENESIN 600 MG: 600 TABLET, EXTENDED RELEASE ORAL at 21:23

## 2019-05-20 RX ADMIN — INSULIN LISPRO 2 UNITS: 100 INJECTION, SOLUTION INTRAVENOUS; SUBCUTANEOUS at 06:57

## 2019-05-20 RX ADMIN — DOPAMINE HYDROCHLORIDE IN DEXTROSE 3 MCG/KG/MIN: 3.2 INJECTION, SOLUTION INTRAVENOUS at 10:58

## 2019-05-20 RX ADMIN — POTASSIUM CHLORIDE 40 MEQ: 750 TABLET, EXTENDED RELEASE ORAL at 14:37

## 2019-05-20 RX ADMIN — OXYCODONE HYDROCHLORIDE AND ACETAMINOPHEN 1 TABLET: 5; 325 TABLET ORAL at 19:51

## 2019-05-20 RX ADMIN — Medication 10 ML: at 07:24

## 2019-05-20 RX ADMIN — IPRATROPIUM BROMIDE AND ALBUTEROL SULFATE 3 ML: .5; 3 SOLUTION RESPIRATORY (INHALATION) at 00:12

## 2019-05-20 RX ADMIN — OXYCODONE HYDROCHLORIDE AND ACETAMINOPHEN 1 TABLET: 5; 325 TABLET ORAL at 08:00

## 2019-05-20 RX ADMIN — GABAPENTIN 800 MG: 100 CAPSULE ORAL at 21:24

## 2019-05-20 RX ADMIN — ACETAMINOPHEN 650 MG: 325 TABLET ORAL at 16:08

## 2019-05-20 RX ADMIN — HEPARIN SODIUM 5000 UNITS: 5000 INJECTION INTRAVENOUS; SUBCUTANEOUS at 11:01

## 2019-05-20 RX ADMIN — LEVOFLOXACIN 250 MG: 5 INJECTION, SOLUTION INTRAVENOUS at 19:30

## 2019-05-20 RX ADMIN — IPRATROPIUM BROMIDE AND ALBUTEROL SULFATE 3 ML: .5; 3 SOLUTION RESPIRATORY (INHALATION) at 19:09

## 2019-05-20 RX ADMIN — SODIUM CHLORIDE 60 ML/HR: 900 INJECTION, SOLUTION INTRAVENOUS at 19:49

## 2019-05-20 RX ADMIN — HEPARIN SODIUM 5000 UNITS: 5000 INJECTION INTRAVENOUS; SUBCUTANEOUS at 03:09

## 2019-05-20 RX ADMIN — Medication 10 ML: at 21:25

## 2019-05-20 RX ADMIN — PANTOPRAZOLE SODIUM 40 MG: 40 TABLET, DELAYED RELEASE ORAL at 06:57

## 2019-05-20 RX ADMIN — GABAPENTIN 800 MG: 400 CAPSULE ORAL at 11:01

## 2019-05-20 RX ADMIN — SODIUM CHLORIDE 60 ML/HR: 900 INJECTION, SOLUTION INTRAVENOUS at 02:25

## 2019-05-20 RX ADMIN — POTASSIUM CHLORIDE 40 MEQ: 750 TABLET, EXTENDED RELEASE ORAL at 17:37

## 2019-05-20 RX ADMIN — BUDESONIDE 250 MCG: 0.25 INHALANT RESPIRATORY (INHALATION) at 19:09

## 2019-05-20 RX ADMIN — GUAIFENESIN 600 MG: 600 TABLET, EXTENDED RELEASE ORAL at 08:32

## 2019-05-20 RX ADMIN — ASPIRIN 325 MG: 325 TABLET ORAL at 08:32

## 2019-05-20 RX ADMIN — BUDESONIDE 250 MCG: 0.25 INHALANT RESPIRATORY (INHALATION) at 11:11

## 2019-05-20 RX ADMIN — ATORVASTATIN CALCIUM 20 MG: 20 TABLET, FILM COATED ORAL at 21:22

## 2019-05-20 RX ADMIN — DOPAMINE HYDROCHLORIDE IN DEXTROSE 4 MCG/KG/MIN: 3.2 INJECTION, SOLUTION INTRAVENOUS at 19:56

## 2019-05-20 RX ADMIN — IPRATROPIUM BROMIDE AND ALBUTEROL SULFATE 3 ML: .5; 3 SOLUTION RESPIRATORY (INHALATION) at 09:22

## 2019-05-20 RX ADMIN — HEPARIN SODIUM 5000 UNITS: 5000 INJECTION INTRAVENOUS; SUBCUTANEOUS at 18:30

## 2019-05-20 RX ADMIN — PANTOPRAZOLE SODIUM 40 MG: 40 TABLET, DELAYED RELEASE ORAL at 16:06

## 2019-05-20 RX ADMIN — AMITRIPTYLINE HYDROCHLORIDE 100 MG: 50 TABLET, FILM COATED ORAL at 21:26

## 2019-05-20 RX ADMIN — IPRATROPIUM BROMIDE AND ALBUTEROL SULFATE 3 ML: .5; 3 SOLUTION RESPIRATORY (INHALATION) at 03:53

## 2019-05-20 NOTE — PROGRESS NOTES
Progress Note    Patient: Adam Aparicio MRN: [id number]  SSN: [id number]    YOB: 1960  Age: 66 y.o. Sex: female      Admit Date: 5/18/2019    LOS: 2 days     Subjective:     67 yo WF with non-STEMI, mild CAD, stress cardiomyopathy in the pattern of Takotsubo. Hypotension requiring inotropic support. Cough overnight. No fevers. No shortness of breath. Mild discomfort left flank. Objective:     Vitals:    05/20/19 0923 05/20/19 1000 05/20/19 1100 05/20/19 1130   BP:  (!) 67/54 (!) 71/55 (!) 73/52   Pulse:  91 83 78   Resp:  20 10 10   Temp:       SpO2: 93% 95% 93% 98%   Weight:       Height:            Intake and Output:  Current Shift: 05/20 0701 - 05/20 1900  In: 728.4 [P.O.:480; I.V.:248.4]  Out: -   Last three shifts: 05/18 1901 - 05/20 0700  In: 4531.1 [P.O.:1750; I.V.:2781.1]  Out: 3650 [Urine:3650]    Physical Exam:   General:  Alert, cooperative, no distress, appears stated age. Eyes:  Conjunctivae/corneas clear. PERRL, EOMs intact. Fundi benign   Ears:  Normal TMs and external ear canals both ears. Nose: Nares normal. Septum midline. Mucosa normal. No drainage or sinus tenderness. Mouth/Throat: Lips, mucosa, and tongue normal. Teeth and gums normal.   Neck: Supple, symmetrical, trachea midline, no adenopathy, thyroid: no enlargment/tenderness/nodules, no carotid bruit and no JVD. Back:   Symmetric, no curvature. ROM normal. No CVA tenderness. Lungs:   Diminished to auscultation bilaterally. Heart:  Regular rate and rhythm, S1, S2 normal, no murmur, click, rub or gallop. Abdomen:   Soft, non-tender. Bowel sounds normal. No masses,  No organomegaly. Extremities: Extremities normal, atraumatic, no cyanosis or edema. Pulses: 1+ and symmetric all extremities. Skin: Skin color, texture, turgor normal. No rashes or lesions   Lymph nodes: Cervical, supraclavicular, and axillary nodes normal.   Neurologic: CNII-XII intact.  Normal strength, sensation and reflexes throughout. Lab/Data Review:  BMP:   Lab Results   Component Value Date/Time     05/20/2019 03:18 AM    K 3.4 (L) 05/20/2019 03:18 AM     05/20/2019 03:18 AM    CO2 27 05/20/2019 03:18 AM    AGAP 8 05/20/2019 03:18 AM     (H) 05/20/2019 03:18 AM    BUN 19 05/20/2019 03:18 AM    CREA 1.35 (H) 05/20/2019 03:18 AM    GFRAA 49 (L) 05/20/2019 03:18 AM    GFRNA 41 (L) 05/20/2019 03:18 AM     CMP:   Lab Results   Component Value Date/Time     05/20/2019 03:18 AM    K 3.4 (L) 05/20/2019 03:18 AM     05/20/2019 03:18 AM    CO2 27 05/20/2019 03:18 AM    AGAP 8 05/20/2019 03:18 AM     (H) 05/20/2019 03:18 AM    BUN 19 05/20/2019 03:18 AM    CREA 1.35 (H) 05/20/2019 03:18 AM    GFRAA 49 (L) 05/20/2019 03:18 AM    GFRNA 41 (L) 05/20/2019 03:18 AM    CA 8.3 (L) 05/20/2019 03:18 AM         Assessment:     Active Problems:    NSTEMI (non-ST elevated myocardial infarction) ([facility] [address] (5/18/2019)        Plan:     65 yo WF with stress cardiomyopathy. Marginal hemodynamics requiring support. Continue current support until LV function improves. Increase activity. Repeat echocardiogram in 24-48 hours. RHC if no improvement.     Signed By: Hiren Holman MD     May 20, 2019

## 2019-05-20 NOTE — PROGRESS NOTES
Reason for Admission:   COPD exacerbation                  RRAT Score:     13             Do you (patient/family) have any concerns for transition/discharge? No concerns for transition at discharge. Patient states family or friends will transport patient home at time of discharge. Plan for utilizing home health:   Patient agreeable to [address] for [facility] visit given COPD diagnosis. Current Advanced Directive/Advance Care Plan:  Not on file    Likelihood of readmission? Moderate due to chronic illness of COPD            Transition of Care Plan:        CM met with patient at bedside to discuss discharge planning. Patient alert and oriented; able to answer CM questions appropriately. Patient agreeable to nursing visit from [facility] after discharge. CM to request orders if MD is agreeable to need. Patient states she uses a cane PRN when her leg bothers her. Patient is able to drive and independent with self-care and ADLs. Patient uses [facility] on [address]. Patient lives with her son and is checked on by her friend, Sammy Colon, daily. Patient states her PCP is incorrect Briscoe Babinski, NP). She now sees Dr. Debbie Lewis. CM updated chart.      Melissa Caro, MPH

## 2019-05-20 NOTE — PROGRESS NOTES
2000: Bedside report received from BELA Manzano RN, assumed care of pt.  1843: CHG bath completed. Linens changed. Pt up to chair. 0800: Bedside and Verbal shift change report given to GUILLE Reyes (oncoming nurse) by Sherman Chan (offgoing nurse). Report included the following information SBAR, Kardex, MAR, Recent Results and Cardiac Rhythm NSR.        Problem: Cath Lab Procedures: Post-Cath Day 1  Goal: Medications  Outcome: Progressing Towards Goal  Note:   Dopamine gtt- weaning     Problem: Unstable angina/NSTEMI: Day 2  Goal: Diagnostic Test/Procedures  Outcome: Progressing Towards Goal  Note:   POD # 1 heart cath     Problem: Unstable angina/NSTEMI: Day 2  Goal: *Hemodynamically stable  Outcome: Progressing Towards Goal     Problem: Heart Failure: Day 1  Goal: Treatments/Interventions/Procedures  Outcome: Progressing Towards Goal

## 2019-05-20 NOTE — CARDIO/PULMONARY
Cardiac Rehab: MI education folder, with catheterization brochure, to bedside of Gorge Sood. Educated using teach back method. Reviewed MI diagnosis definition and purpose of intervention. Discussed risk factors for CAD to include the following: family history, elevated BMI, hyperlipidemia, hypertension, diabetes, stress, and smoking. Smoking Cessation Program link added to AVS. Discussed Heart Healthy/Low Sodium (2000 mg) diet. Discussed follow up appointments with cardiologist, signs and symptoms of angina, and what to report to physician after discharge. Emphasized the value of cardiac rehab. Discussed Cardiac Rehab Program format, benefits, and encouraged enrollment to assist with risk modification and management. Living with Heart Failure Booklet given to Gorge Sood. Provided a scale from the HF Nurse Navigators for home use. Educated using teach back method. Discussed diagnosis definition and assessed patient understanding. Reviewed importance of daily weight monitoring and Low Sodium diet (7752-2121 mg. Daily). Stressed the importance of reading food labels to limit sodium. Family has brought her biscuits and sausage gravy from [address] [place] [address] today  so discussed the need to limit fast food. Encouraged activity and rest periods within symptom limitations and as ordered by physician. .  Discussed importance of reporting signs and symptoms of exacerbation, and when to report them to the doctor, to prevent re-hospitalization. Gorge Saavedrable was encouraged to keep all appointments with doctor. Smoking history assessed. Patient is a current smoker. Smoking Cessation Program link added to the AVS.   CARLA Campo could benefit from further education on the following HF topics: medications    .

## 2019-05-20 NOTE — PROGRESS NOTES
0800: Assumed care of patient from off going nurse Angelica Wright RN. Drips verified with RN's   0930: Dr. Matteo Marcus at bedside, updated on patient status including labs. Patient will remain in ICU until Dopamine is weaned off.  1030: Dr. Will Hatch at bedside,updated on patient status. Orders to wean dopamine for maps of 60.  1200: Patient in chair, having lunch. Family at bedside. 1630: Patient feeling anxious and requesting medication. 1800: up for dinner. Family at bedside. 2000:Bedside and Verbal shift change report given to Sophie Lopez (oncoming nurse) by Rajiv Kraus RN (offgoing nurse). Report included the following information SBAR.

## 2019-05-20 NOTE — PROGRESS NOTES
Hospitalist Progress Note  Nicolas Washburn MD  Answering service: [phone] from in house phone        Date of Service:  2019  NAME:  Calos Hall  :  1960  MRN:  [id number]  PCP: Britni Wilburn NP    Chief Complaint:   Chief Complaint   Patient presents with    Shortness of Breath         Admission Summary:     Calos Hall is a 66 y.o. female who presented with SOB    Interval history / Subjective:   Patient seen for Follow up of NSTEMI    Patient seen and examined by the bedside  Labs, images and notes reviewed  Patient is comfortable, SOB is improving, cont to have cough with thick yellow/green sputum production, chest pain only with coughing, no chest tightness, no NVD, no abd pain. Afebrile  Cont to be hypotensive, on Dopamine egtt  Discussed with nursing staff, no acute issues overnight, orders reviewed. Assessment & Plan:     - NSTEMI, POA  S/p cardiac cath: Mild to Mod CAD, diagnostic, low EF < 25% with severe hypokinesis  On ASA, Lopressors with holding parameters, statins    - Cardiogenic shock  Improving  IVF for now  On Dopamine gtt, cards to manage. GDMT to be started once patient is off Dopamine    - Takotsubo cardiomyopathy  As per cardiac cath, POA  Management as above    - COPD exacerbation  Improving, cont steroids, nebs RTC, Mucinex, inhaled steroids, Levaquin  CT chest reviewed.  Pt should get annual lose dose screening chest CT    - Pre-diabetes  Patient has history of gestational Diabetes in the setting of steroids  HbA1c 6.4  Cont SSI, accu-checks TID AC, diabetic diet    -Hypokalemia: replete, repeat bmp in am  -GERD: PPI  -Active smoker: cont on Nicotine patch  -CKD 3:Stable, repeat bmp in am, avoid nephrotoxins, renal dosing of meds  -chronic anxiety: cont home meds  -Opiate use, chronic: percocet PRN    Code status: Full Code    DVT prophylaxis: Heparin SQ    Care Plan discussed with: Patient/Family and Nurse    Disposition: [facility]    Hospital Problems  Date Reviewed: 2011          Codes Class Noted POA    NSTEMI (non-ST elevated myocardial infarction) [facility]) ICD-10-CM: I21.4  ICD-9-CM: 410.70  2019 Unknown                Review of Systems:   A comprehensive review of systems was negative. Physical Examination:       General appearance: alert, no distress, appears stated age  Throat: normal findings: buccal mucosa normal  Lungs: decreased AE through out with distant breath sounds, no wheezing, no labored breathing  Heart: regular rate and rhythm  Abdomen: soft, non-tender. Bowel sounds normal. No masses,  no organomegaly  Extremities: extremities normal, atraumatic, no cyanosis or edema  Pulses: 2+ and symmetric  Neurologic: Alert and oriented X 3, normal strength and tone. Vital Signs:    Last 24hrs VS reviewed since prior progress note. Most recent are:    Visit Vitals  BP (!) 71/55   Pulse 83   Temp 97.6 °F (36.4 °C)   Resp 10   Ht 5' 4\" (1.626 m)   Wt 57.5 kg (126 lb 12.2 oz)   SpO2 93%   BMI 21.76 kg/m²         Intake/Output Summary (Last 24 hours) at 2019 1109  Last data filed at 2019 1100  Gross per 24 hour   Intake 3146.8 ml   Output 1850 ml   Net 1296.8 ml        Tmax:  Temp (24hrs), Av.8 °F (36.6 °C), Min:97.2 °F (36.2 °C), Max:98.2 °F (36.8 °C)      Data Review:     Ct Chest Wo Cont    Result Date: 2019  CT CHEST WITHOUT CONTRAST. 2019 2:35 PM INDICATION: Dyspnea, cough. COMPARISON: CT abdomen pelvis 10/11/2017. TECHNIQUE: CT of the chest was performed without contrast. Coronal and sagittal reconstructions were performed. CT dose reduction was achieved through use of a standardized protocol tailored for this examination and automatic exposure control for dose modulation. Adaptive statistical iterative reconstruction (ASIR) was utilized. FINDINGS: Panlobular emphysema in the upper lobes is mild. Mild endobronchial mucous plugging in the basilar segments of the right lobe is similar to 2017.  A sub-6 mm, subpleural nodule in the anterior segment of the right middle lobe (3-36) is fat density. Minimal linear scarring is associated in the lung immediately deep to it. The central airways are patent. No pneumothorax or pleural effusion. The heart size is normal. Left anterior descending coronary calcifications are moderate. No thoracic lymphadenopathy. Post cholecystectomy. Incidental note is made of right renal cysts. IMPRESSION: 1. Mild right lower lobe mucus plugging. 2. Mild emphysema. 3. Annual low dose chest CT is recommended for lung carcinoma screening if the patient meets criteria. Criteria for lung carcinoma screening with low-dose CT: 3 46-80 years old. 2. No signs or symptoms of lung carcinoma. 3. 30 pack-year or greater smoking history. 4. Current smoker or quit within the last 15 years. 5. Written order from a health professional following lung cancer screening counseling that attests to shared decision-making having taken place before their first screening CT. www.acr.org/Quality-Safety/Resources/LungRADS www. USpreventativeservicestaskforce.org/page/document/updatesummaryfinal/lung-ca cer-screening     Xr Chest Port    Result Date: 5/18/2019  PORTABLE CHEST RADIOGRAPH/S: 5/18/2019 11:54 AM INDICATION: Pneumonia, COPD, dyspnea. COMPARISON: 9/10/2015, 6/9/2013, 2/15/2006. TECHNIQUE: Portable frontal upright radiograph/s of the chest. FINDINGS: The lungs are clear. The central airways are patent. No pneumothorax or pleural effusion. IMPRESSION: Clear lungs.     Lab Results   Component Value Date/Time    Specimen Description: URINE 04/10/2014 11:31 PM     Lab Results   Component Value Date/Time    Culture result: MIXED UROGENITAL ALAN ISOLATED 04/10/2014 11:31 PM     All Micro Results     None          Labs:     Recent Labs     05/20/19  0318 05/19/19  0811   WBC 12.6* 9.1   HGB 11.5 12.0   HCT 37.0 37.2    310     Recent Labs     05/20/19  0318 05/19/19  0811 05/18/19  1134    139 135*   K 3.4* 3.6 3.1*    105 98   CO2 27 27 31   BUN 19 18 21*   CREA 1.35* 1.27* 1.28*   * 173* 179*   CA 8.3* 8.7 9.0     No results for input(s): SGOT, GPT, ALT, AP, TBIL, TBILI, TP, ALB, GLOB, GGT, AML, LPSE in the last 72 hours. No lab exists for component: AMYP, HLPSE  Recent Labs     05/19/19  0811   INR 1.2*   PTP 11.9*      No results for input(s): FE, TIBC, PSAT, FERR in the last 72 hours. No results found for: FOL, RBCF   No results for input(s): PH, PCO2, PO2 in the last 72 hours.   Recent Labs     05/19/19  0811 05/18/19 2012 05/18/19  1134   TROIQ 2.99* 4.62* 11.90*     Lab Results   Component Value Date/Time    Cholesterol, total 139 05/19/2019 08:11 AM    HDL Cholesterol 46 05/19/2019 08:11 AM    LDL, calculated 77.6 05/19/2019 08:11 AM    Triglyceride 77 05/19/2019 08:11 AM    CHOL/HDL Ratio 3.0 05/19/2019 08:11 AM     Lab Results   Component Value Date/Time    Glucose (POC) 143 (H) 05/20/2019 06:53 AM    Glucose (POC) 156 (H) 05/19/2019 09:06 PM    Glucose (POC) 120 (H) 05/19/2019 04:38 PM    Glucose (POC) 162 (H) 05/19/2019 12:20 PM    Glucose (POC) 189 (H) 05/19/2019 08:09 AM     Lab Results   Component Value Date/Time    Color YELLOW/STRAW 10/11/2017 09:19 PM    Appearance CLEAR 10/11/2017 09:19 PM    Specific gravity 1.016 10/11/2017 09:19 PM    pH (UA) 7.0 10/11/2017 09:19 PM    Protein NEGATIVE  10/11/2017 09:19 PM    Glucose NEGATIVE  10/11/2017 09:19 PM    Ketone NEGATIVE  10/11/2017 09:19 PM    Bilirubin NEGATIVE  10/11/2017 09:19 PM    Urobilinogen 1.0 10/11/2017 09:19 PM    Nitrites NEGATIVE  10/11/2017 09:19 PM    Leukocyte Esterase NEGATIVE  10/11/2017 09:19 PM    Epithelial cells FEW 10/11/2017 09:19 PM    Bacteria NEGATIVE  10/11/2017 09:19 PM    WBC 0-4 10/11/2017 09:19 PM    RBC 5-10 10/11/2017 09:19 PM         Medications Reviewed:     Current Facility-Administered Medications   Medication Dose Route Frequency    potassium chloride SR (KLOR-CON 10) tablet 40 mEq  40 mEq Oral BID    glucose chewable tablet 16 g  4 Tab Oral PRN    dextrose (D50W) injection syrg 12.5-25 g  25-50 mL IntraVENous PRN    glucagon (GLUCAGEN) injection 1 mg  1 mg IntraMUSCular PRN    insulin lispro (HUMALOG) injection   SubCUTAneous AC&HS    heparin (porcine) injection 5,000 Units  5,000 Units SubCUTAneous Q8H    benzocaine-zinc cl-benzalkonium cl (ORAJEL) 20-0.1-0.02 % mucosal gel   Oral PRN    aspirin tablet 325 mg  325 mg Oral DAILY    amitriptyline (ELAVIL) tablet 100 mg  100 mg Oral QHS    gabapentin (NEURONTIN) capsule 800 mg  800 mg Oral QID    clonazePAM (KlonoPIN) tablet 0.5 mg  0.5 mg Oral Q12H PRN    albuterol-ipratropium (DUO-NEB) 2.5 MG-0.5 MG/3 ML  3 mL Nebulization Q4H RT    sodium chloride (NS) flush 5-40 mL  5-40 mL IntraVENous Q8H    sodium chloride (NS) flush 5-40 mL  5-40 mL IntraVENous PRN    nitroglycerin (NITROSTAT) tablet 0.4 mg  0.4 mg SubLINGual Q5MIN PRN    naloxone (NARCAN) injection 0.4 mg  0.4 mg IntraVENous PRN    oxyCODONE-acetaminophen (PERCOCET) 5-325 mg per tablet 1 Tab  1 Tab Oral Q4H PRN    morphine injection 4 mg  4 mg IntraVENous Q4H PRN    pantoprazole (PROTONIX) tablet 40 mg  40 mg Oral ACB&D    budesonide (PULMICORT) 250 mcg/2ml nebulizer susp  250 mcg Nebulization BID RT    nicotine (NICODERM CQ) 14 mg/24 hr patch 1 Patch  1 Patch TransDERmal DAILY    atorvastatin (LIPITOR) tablet 20 mg  20 mg Oral QHS    acetaminophen (TYLENOL) tablet 650 mg  650 mg Oral Q4H PRN    DOPamine (INTROPIN) 800 mg in dextrose 5% 250 mL infusion  0-20 mcg/kg/min IntraVENous TITRATE    guaiFENesin ER (MUCINEX) tablet 600 mg  600 mg Oral Q12H    levoFLOXacin (LEVAQUIN) 250 mg in D5W IVPB  250 mg IntraVENous Q24H    0.9% sodium chloride infusion  60 mL/hr IntraVENous CONTINUOUS     ______________________________________________________________________  EXPECTED LENGTH OF STAY: - - -  ACTUAL LENGTH OF STAY:          2                 Treva García, MD

## 2019-05-20 NOTE — PROGRESS NOTES
0745 - Bedside report received from He Nathan RN. Pt up in chair. 0800 - Pt c/o 8/10 pain, coughing frequently. No shoulder or jaw pain. No nausea. PRN percocet given. 0900 - I.S given for diminished lowers. Pt coughing up thick tan/green sputum. 1100 - MAP 60, Dopamine gtt increased per orders. 1130 - MAP remains 60, dopamine increased.

## 2019-05-20 NOTE — CARDIO/PULMONARY
Cardiac Rehab: Per EMR, patient is a current smoker.  Smoking Cessation Program information placed on the AVS.    Luis Darden RN

## 2019-05-21 ENCOUNTER — APPOINTMENT (OUTPATIENT)
Dept: GENERAL RADIOLOGY | Age: 59
DRG: 215 | End: 2019-05-21
Attending: THORACIC SURGERY (CARDIOTHORACIC VASCULAR SURGERY)
Payer: MEDICARE

## 2019-05-21 ENCOUNTER — ANESTHESIA EVENT (OUTPATIENT)
Dept: CARDIOTHORACIC SURGERY | Age: 59
DRG: 215 | End: 2019-05-21
Payer: MEDICARE

## 2019-05-21 ENCOUNTER — ANESTHESIA (OUTPATIENT)
Dept: CARDIOTHORACIC SURGERY | Age: 59
DRG: 215 | End: 2019-05-21
Payer: MEDICARE

## 2019-05-21 ENCOUNTER — APPOINTMENT (OUTPATIENT)
Dept: GENERAL RADIOLOGY | Age: 59
DRG: 215 | End: 2019-05-21
Attending: NURSE PRACTITIONER
Payer: MEDICARE

## 2019-05-21 PROBLEM — I50.23 CHF (CONGESTIVE HEART FAILURE), NYHA CLASS IV, ACUTE ON CHRONIC, SYSTOLIC (HCC): Status: ACTIVE | Noted: 2019-05-18

## 2019-05-21 PROBLEM — R07.9 CHEST PAIN: Status: ACTIVE | Noted: 2019-05-18

## 2019-05-21 LAB
ADMINISTERED INITIALS, ADMINIT: NORMAL
ALBUMIN SERPL-MCNC: 2.2 G/DL (ref 3.5–5)
ALBUMIN SERPL-MCNC: 2.3 G/DL (ref 3.5–5)
ALBUMIN/GLOB SERPL: 0.7 {RATIO} (ref 1.1–2.2)
ALBUMIN/GLOB SERPL: 0.7 {RATIO} (ref 1.1–2.2)
ALP SERPL-CCNC: 118 U/L (ref 45–117)
ALP SERPL-CCNC: 128 U/L (ref 45–117)
ALT SERPL-CCNC: 26 U/L (ref 12–78)
ALT SERPL-CCNC: 29 U/L (ref 12–78)
AMMONIA PLAS-SCNC: 37 UMOL/L
AMPHET UR QL SCN: NEGATIVE
AMYLASE SERPL-CCNC: 33 U/L (ref 25–115)
ANION GAP SERPL CALC-SCNC: 6 MMOL/L (ref 5–15)
ANION GAP SERPL CALC-SCNC: 6 MMOL/L (ref 5–15)
ARTERIAL PATENCY WRIST A: ABNORMAL
AST SERPL-CCNC: 21 U/L (ref 15–37)
AST SERPL-CCNC: 27 U/L (ref 15–37)
BARBITURATES UR QL SCN: NEGATIVE
BASE DEFICIT BLD-SCNC: 1 MMOL/L
BASE EXCESS BLD CALC-SCNC: 1 MMOL/L
BASE EXCESS BLDV CALC-SCNC: 0 MMOL/L
BASOPHILS # BLD: 0.1 K/UL (ref 0–0.1)
BASOPHILS NFR BLD: 2 % (ref 0–1)
BDY SITE: ABNORMAL
BENZODIAZ UR QL: POSITIVE
BILIRUB DIRECT SERPL-MCNC: <0.1 MG/DL (ref 0–0.2)
BILIRUB SERPL-MCNC: 0.3 MG/DL (ref 0.2–1)
BILIRUB SERPL-MCNC: 0.3 MG/DL (ref 0.2–1)
BUN SERPL-MCNC: 13 MG/DL (ref 6–20)
BUN SERPL-MCNC: 16 MG/DL (ref 6–20)
BUN/CREAT SERPL: 12 (ref 12–20)
BUN/CREAT SERPL: 14 (ref 12–20)
CALCIUM SERPL-MCNC: 7.8 MG/DL (ref 8.5–10.1)
CALCIUM SERPL-MCNC: 8.7 MG/DL (ref 8.5–10.1)
CANNABINOIDS UR QL SCN: NEGATIVE
CHLORIDE SERPL-SCNC: 105 MMOL/L (ref 97–108)
CHLORIDE SERPL-SCNC: 108 MMOL/L (ref 97–108)
CO2 SERPL-SCNC: 25 MMOL/L (ref 21–32)
CO2 SERPL-SCNC: 27 MMOL/L (ref 21–32)
COCAINE UR QL SCN: POSITIVE
CORTIS 1H P CHAL SERPL-MCNC: 5.8 UG/DL
CORTIS 30M P CHAL SERPL-MCNC: 3.2 UG/DL
CORTIS BS SERPL-MCNC: 2.1 UG/DL
CORTIS SERPL-MCNC: 4 UG/DL
CREAT SERPL-MCNC: 1.07 MG/DL (ref 0.55–1.02)
CREAT SERPL-MCNC: 1.13 MG/DL (ref 0.55–1.02)
CRP SERPL HS-MCNC: >9.5 MG/L
D50 ADMINISTERED, D50ADM: 0 ML
D50 ORDER, D50ORD: 0 ML
DIFFERENTIAL METHOD BLD: ABNORMAL
DRUG SCRN COMMENT,DRGCM: ABNORMAL
EOSINOPHIL # BLD: 0.1 K/UL (ref 0–0.4)
EOSINOPHIL NFR BLD: 1 % (ref 0–7)
ERYTHROCYTE [DISTWIDTH] IN BLOOD BY AUTOMATED COUNT: 13.2 % (ref 11.5–14.5)
ERYTHROCYTE [DISTWIDTH] IN BLOOD BY AUTOMATED COUNT: 13.4 % (ref 11.5–14.5)
ERYTHROCYTE [SEDIMENTATION RATE] IN BLOOD: 52 MM/HR (ref 0–30)
FERRITIN SERPL-MCNC: 187 NG/ML (ref 8–252)
GAS FLOW.O2 O2 DELIVERY SYS: ABNORMAL L/MIN
GAS FLOW.O2 SETTING OXYMISER: 12 BPM
GAS FLOW.O2 SETTING OXYMISER: 2 L/M
GLOBULIN SER CALC-MCNC: 3.2 G/DL (ref 2–4)
GLOBULIN SER CALC-MCNC: 3.3 G/DL (ref 2–4)
GLSCOM COMMENTS: NORMAL
GLUCOSE BLD STRIP.AUTO-MCNC: 116 MG/DL (ref 65–100)
GLUCOSE BLD STRIP.AUTO-MCNC: 127 MG/DL (ref 65–100)
GLUCOSE BLD STRIP.AUTO-MCNC: 144 MG/DL (ref 65–100)
GLUCOSE BLD STRIP.AUTO-MCNC: 148 MG/DL (ref 65–100)
GLUCOSE BLD STRIP.AUTO-MCNC: 149 MG/DL (ref 65–100)
GLUCOSE BLD STRIP.AUTO-MCNC: 174 MG/DL (ref 65–100)
GLUCOSE BLD STRIP.AUTO-MCNC: 83 MG/DL (ref 65–100)
GLUCOSE BLD STRIP.AUTO-MCNC: 98 MG/DL (ref 65–100)
GLUCOSE SERPL-MCNC: 126 MG/DL (ref 65–100)
GLUCOSE SERPL-MCNC: 132 MG/DL (ref 65–100)
GLUCOSE, GLC: 116 MG/DL
GLUCOSE, GLC: 127 MG/DL
GLUCOSE, GLC: 144 MG/DL
GLUCOSE, GLC: 149 MG/DL
GLUCOSE, GLC: 83 MG/DL
GLUCOSE, GLC: 98 MG/DL
HAV IGM SER QL: NONREACTIVE
HBV CORE IGM SER QL: NONREACTIVE
HBV SURFACE AG SER QL: <0.1 INDEX
HBV SURFACE AG SER QL: NEGATIVE
HCO3 BLD-SCNC: 24 MMOL/L (ref 22–26)
HCO3 BLD-SCNC: 26.8 MMOL/L (ref 22–26)
HCO3 BLDV-SCNC: 25.5 MMOL/L (ref 23–28)
HCT VFR BLD AUTO: 31.7 % (ref 35–47)
HCT VFR BLD AUTO: 35.9 % (ref 35–47)
HCV AB SERPL QL IA: NONREACTIVE
HCV COMMENT,HCGAC: NORMAL
HGB BLD-MCNC: 10.1 G/DL (ref 11.5–16)
HGB BLD-MCNC: 11.3 G/DL (ref 11.5–16)
HIGH TARGET, HITG: 130 MG/DL
IMM GRANULOCYTES # BLD AUTO: 0 K/UL (ref 0–0.04)
IMM GRANULOCYTES NFR BLD AUTO: 1 % (ref 0–0.5)
INR PPP: 1.1 (ref 0.9–1.1)
INSULIN ADMINSTERED, INSADM: 0 UNITS/HOUR
INSULIN ADMINSTERED, INSADM: 1.1 UNITS/HOUR
INSULIN ADMINSTERED, INSADM: 1.7 UNITS/HOUR
INSULIN ADMINSTERED, INSADM: 2 UNITS/HOUR
INSULIN ADMINSTERED, INSADM: 2.7 UNITS/HOUR
INSULIN ADMINSTERED, INSADM: 3.4 UNITS/HOUR
INSULIN ORDER, INSORD: 0.7 UNITS/HOUR
INSULIN ORDER, INSORD: 1.1 UNITS/HOUR
INSULIN ORDER, INSORD: 1.7 UNITS/HOUR
INSULIN ORDER, INSORD: 2 UNITS/HOUR
INSULIN ORDER, INSORD: 2.7 UNITS/HOUR
INSULIN ORDER, INSORD: 3.4 UNITS/HOUR
IRON SATN MFR SERPL: 10 % (ref 20–50)
IRON SERPL-MCNC: 22 UG/DL (ref 35–150)
LACTATE SERPL-SCNC: 0.7 MMOL/L (ref 0.4–2)
LACTATE SERPL-SCNC: 0.9 MMOL/L (ref 0.4–2)
LIPASE SERPL-CCNC: 37 U/L (ref 73–393)
LOW TARGET, LOT: 95 MG/DL
LYMPHOCYTES # BLD: 2 K/UL (ref 0.8–3.5)
LYMPHOCYTES NFR BLD: 26 % (ref 12–49)
MCH RBC QN AUTO: 28.4 PG (ref 26–34)
MCH RBC QN AUTO: 28.7 PG (ref 26–34)
MCHC RBC AUTO-ENTMCNC: 31.5 G/DL (ref 30–36.5)
MCHC RBC AUTO-ENTMCNC: 31.9 G/DL (ref 30–36.5)
MCV RBC AUTO: 90.1 FL (ref 80–99)
MCV RBC AUTO: 90.2 FL (ref 80–99)
METHADONE UR QL: NEGATIVE
MINUTES UNTIL NEXT BG, NBG: 60 MIN
MONOCYTES # BLD: 0.6 K/UL (ref 0–1)
MONOCYTES NFR BLD: 7 % (ref 5–13)
MULTIPLIER, MUL: 0.03
MULTIPLIER, MUL: 0.04
NEUTS SEG # BLD: 5.1 K/UL (ref 1.8–8)
NEUTS SEG NFR BLD: 63 % (ref 32–75)
NRBC # BLD: 0 K/UL (ref 0–0.01)
NRBC # BLD: 0 K/UL (ref 0–0.01)
NRBC BLD-RTO: 0 PER 100 WBC
NRBC BLD-RTO: 0 PER 100 WBC
O2/TOTAL GAS SETTING VFR VENT: 40 %
O2/TOTAL GAS SETTING VFR VENT: 80 %
OPIATES UR QL: POSITIVE
ORDER INITIALS, ORDINIT: NORMAL
PCO2 BLD: 40.1 MMHG (ref 35–45)
PCO2 BLD: 50.3 MMHG (ref 35–45)
PCO2 BLDV: 45.8 MMHG (ref 41–51)
PCP UR QL: NEGATIVE
PEEP RESPIRATORY: 5 CMH2O
PEEP RESPIRATORY: 5 CMH2O
PH BLD: 7.33 [PH] (ref 7.35–7.45)
PH BLD: 7.38 [PH] (ref 7.35–7.45)
PH BLDV: 7.35 [PH] (ref 7.32–7.42)
PLATELET # BLD AUTO: 273 K/UL (ref 150–400)
PLATELET # BLD AUTO: 317 K/UL (ref 150–400)
PMV BLD AUTO: 9.4 FL (ref 8.9–12.9)
PMV BLD AUTO: 9.9 FL (ref 8.9–12.9)
PO2 BLD: 157 MMHG (ref 80–100)
PO2 BLD: 253 MMHG (ref 80–100)
PO2 BLDV: 29 MMHG (ref 25–40)
POTASSIUM SERPL-SCNC: 4.4 MMOL/L (ref 3.5–5.1)
POTASSIUM SERPL-SCNC: 4.6 MMOL/L (ref 3.5–5.1)
PRESSURE SUPPORT SETTING VENT: 5 CMH2O
PRESSURE SUPPORT SETTING VENT: 5 CMH2O
PROCALCITONIN SERPL-MCNC: 0.1 NG/ML
PROLACTIN SERPL-MCNC: 4.7 NG/ML
PROT SERPL-MCNC: 5.5 G/DL (ref 6.4–8.2)
PROT SERPL-MCNC: 5.5 G/DL (ref 6.4–8.2)
PROTHROMBIN TIME: 11 SEC (ref 9–11.1)
RBC # BLD AUTO: 3.52 M/UL (ref 3.8–5.2)
RBC # BLD AUTO: 3.98 M/UL (ref 3.8–5.2)
RHEUMATOID FACT SERPL-ACNC: <10 IU/ML
SAO2 % BLD: 100 % (ref 92–97)
SAO2 % BLD: 99 % (ref 92–97)
SAO2 % BLDV: 51 % (ref 65–88)
SERVICE CMNT-IMP: ABNORMAL
SERVICE CMNT-IMP: NORMAL
SERVICE CMNT-IMP: NORMAL
SODIUM SERPL-SCNC: 136 MMOL/L (ref 136–145)
SODIUM SERPL-SCNC: 141 MMOL/L (ref 136–145)
SP1: NORMAL
SP2: NORMAL
SP3: NORMAL
SPECIMEN TYPE: ABNORMAL
T3FREE SERPL-MCNC: 1.4 PG/ML (ref 2.2–4)
T4 FREE SERPL-MCNC: 1 NG/DL (ref 0.8–1.5)
T4 FREE SERPL-MCNC: 1.4 NG/DL (ref 0.8–1.5)
TIBC SERPL-MCNC: 210 UG/DL (ref 250–450)
TOTAL RESP. RATE, ITRR: 11
TOTAL RESP. RATE, ITRR: 12
TOTAL RESP. RATE, ITRR: 18
TROPONIN I SERPL-MCNC: 0.81 NG/ML
TSH SERPL DL<=0.05 MIU/L-ACNC: 0.19 UIU/ML (ref 0.36–3.74)
VENTILATION MODE VENT: ABNORMAL
VENTILATION MODE VENT: ABNORMAL
VOLUME CONTROL IVLC: YES
VT SETTING VENT: 440 ML
WBC # BLD AUTO: 7.9 K/UL (ref 3.6–11)
WBC # BLD AUTO: 8.6 K/UL (ref 3.6–11)

## 2019-05-21 PROCEDURE — 85027 COMPLETE CBC AUTOMATED: CPT

## 2019-05-21 PROCEDURE — C1768 GRAFT, VASCULAR: HCPCS | Performed by: THORACIC SURGERY (CARDIOTHORACIC VASCULAR SURGERY)

## 2019-05-21 PROCEDURE — 74011250636 HC RX REV CODE- 250/636

## 2019-05-21 PROCEDURE — 84481 FREE ASSAY (FT-3): CPT

## 2019-05-21 PROCEDURE — 74011000272 HC RX REV CODE- 272: Performed by: THORACIC SURGERY (CARDIOTHORACIC VASCULAR SURGERY)

## 2019-05-21 PROCEDURE — 74011250636 HC RX REV CODE- 250/636: Performed by: THORACIC SURGERY (CARDIOTHORACIC VASCULAR SURGERY)

## 2019-05-21 PROCEDURE — 77030018836 HC SOL IRR NACL ICUM -A: Performed by: THORACIC SURGERY (CARDIOTHORACIC VASCULAR SURGERY)

## 2019-05-21 PROCEDURE — 80307 DRUG TEST PRSMV CHEM ANLYZR: CPT

## 2019-05-21 PROCEDURE — 74011250637 HC RX REV CODE- 250/637: Performed by: INTERNAL MEDICINE

## 2019-05-21 PROCEDURE — 74011000250 HC RX REV CODE- 250: Performed by: NURSE PRACTITIONER

## 2019-05-21 PROCEDURE — 74011636637 HC RX REV CODE- 636/637: Performed by: THORACIC SURGERY (CARDIOTHORACIC VASCULAR SURGERY)

## 2019-05-21 PROCEDURE — 93503 INSERT/PLACE HEART CATHETER: CPT | Performed by: INTERNAL MEDICINE

## 2019-05-21 PROCEDURE — 82962 GLUCOSE BLOOD TEST: CPT

## 2019-05-21 PROCEDURE — 83690 ASSAY OF LIPASE: CPT

## 2019-05-21 PROCEDURE — 74011250636 HC RX REV CODE- 250/636: Performed by: NURSE PRACTITIONER

## 2019-05-21 PROCEDURE — 86900 BLOOD TYPING SEROLOGIC ABO: CPT

## 2019-05-21 PROCEDURE — 74011000250 HC RX REV CODE- 250

## 2019-05-21 PROCEDURE — 93355 ECHO TRANSESOPHAGEAL (TEE): CPT | Performed by: THORACIC SURGERY (CARDIOTHORACIC VASCULAR SURGERY)

## 2019-05-21 PROCEDURE — 74011250636 HC RX REV CODE- 250/636: Performed by: HOSPITALIST

## 2019-05-21 PROCEDURE — 74011636637 HC RX REV CODE- 636/637: Performed by: NURSE PRACTITIONER

## 2019-05-21 PROCEDURE — C1769 GUIDE WIRE: HCPCS | Performed by: THORACIC SURGERY (CARDIOTHORACIC VASCULAR SURGERY)

## 2019-05-21 PROCEDURE — 82784 ASSAY IGA/IGD/IGG/IGM EACH: CPT

## 2019-05-21 PROCEDURE — 84484 ASSAY OF TROPONIN QUANT: CPT

## 2019-05-21 PROCEDURE — 82533 TOTAL CORTISOL: CPT

## 2019-05-21 PROCEDURE — 77030005401 HC CATH RAD ARRO -A: Performed by: ANESTHESIOLOGY

## 2019-05-21 PROCEDURE — 99233 SBSQ HOSP IP/OBS HIGH 50: CPT | Performed by: INTERNAL MEDICINE

## 2019-05-21 PROCEDURE — 94002 VENT MGMT INPAT INIT DAY: CPT

## 2019-05-21 PROCEDURE — 71045 X-RAY EXAM CHEST 1 VIEW: CPT

## 2019-05-21 PROCEDURE — 02HA3RZ INSERTION OF SHORT-TERM EXTERNAL HEART ASSIST SYSTEM INTO HEART, PERCUTANEOUS APPROACH: ICD-10-PCS | Performed by: THORACIC SURGERY (CARDIOTHORACIC VASCULAR SURGERY)

## 2019-05-21 PROCEDURE — 86618 LYME DISEASE ANTIBODY: CPT

## 2019-05-21 PROCEDURE — 77030034848: Performed by: THORACIC SURGERY (CARDIOTHORACIC VASCULAR SURGERY)

## 2019-05-21 PROCEDURE — 94640 AIRWAY INHALATION TREATMENT: CPT

## 2019-05-21 PROCEDURE — 77030011255 HC DSG AQUACEL AG BMS -A: Performed by: THORACIC SURGERY (CARDIOTHORACIC VASCULAR SURGERY)

## 2019-05-21 PROCEDURE — 85652 RBC SED RATE AUTOMATED: CPT

## 2019-05-21 PROCEDURE — 84145 PROCALCITONIN (PCT): CPT

## 2019-05-21 PROCEDURE — 74011250636 HC RX REV CODE- 250/636: Performed by: INTERNAL MEDICINE

## 2019-05-21 PROCEDURE — 86658 ENTEROVIRUS ANTIBODY: CPT

## 2019-05-21 PROCEDURE — 84443 ASSAY THYROID STIM HORMONE: CPT

## 2019-05-21 PROCEDURE — 85610 PROTHROMBIN TIME: CPT

## 2019-05-21 PROCEDURE — 85025 COMPLETE CBC W/AUTO DIFF WBC: CPT

## 2019-05-21 PROCEDURE — 74011000250 HC RX REV CODE- 250: Performed by: INTERNAL MEDICINE

## 2019-05-21 PROCEDURE — 80074 ACUTE HEPATITIS PANEL: CPT

## 2019-05-21 PROCEDURE — 82150 ASSAY OF AMYLASE: CPT

## 2019-05-21 PROCEDURE — 99152 MOD SED SAME PHYS/QHP 5/>YRS: CPT | Performed by: INTERNAL MEDICINE

## 2019-05-21 PROCEDURE — 77030010938 HC CLP LIG TELE -A: Performed by: THORACIC SURGERY (CARDIOTHORACIC VASCULAR SURGERY)

## 2019-05-21 PROCEDURE — 74011000258 HC RX REV CODE- 258

## 2019-05-21 PROCEDURE — 86431 RHEUMATOID FACTOR QUANT: CPT

## 2019-05-21 PROCEDURE — 82803 BLOOD GASES ANY COMBINATION: CPT

## 2019-05-21 PROCEDURE — C1751 CATH, INF, PER/CENT/MIDLINE: HCPCS | Performed by: INTERNAL MEDICINE

## 2019-05-21 PROCEDURE — 82728 ASSAY OF FERRITIN: CPT

## 2019-05-21 PROCEDURE — 83001 ASSAY OF GONADOTROPIN (FSH): CPT

## 2019-05-21 PROCEDURE — 77030018846 HC SOL IRR STRL H20 ICUM -A: Performed by: THORACIC SURGERY (CARDIOTHORACIC VASCULAR SURGERY)

## 2019-05-21 PROCEDURE — 77030020256 HC SOL INJ NACL 0.9%  500ML: Performed by: THORACIC SURGERY (CARDIOTHORACIC VASCULAR SURGERY)

## 2019-05-21 PROCEDURE — C1887 CATHETER, GUIDING: HCPCS | Performed by: THORACIC SURGERY (CARDIOTHORACIC VASCULAR SURGERY)

## 2019-05-21 PROCEDURE — 02HQ32Z INSERTION OF MONITORING DEVICE INTO RIGHT PULMONARY ARTERY, PERCUTANEOUS APPROACH: ICD-10-PCS | Performed by: INTERNAL MEDICINE

## 2019-05-21 PROCEDURE — 83605 ASSAY OF LACTIC ACID: CPT

## 2019-05-21 PROCEDURE — 80053 COMPREHEN METABOLIC PANEL: CPT

## 2019-05-21 PROCEDURE — 77030003029 HC SUT VCRL J&J -B: Performed by: THORACIC SURGERY (CARDIOTHORACIC VASCULAR SURGERY)

## 2019-05-21 PROCEDURE — 36415 COLL VENOUS BLD VENIPUNCTURE: CPT

## 2019-05-21 PROCEDURE — 76010000129 HC CV SURG 1.5 TO 2 HR: Performed by: THORACIC SURGERY (CARDIOTHORACIC VASCULAR SURGERY)

## 2019-05-21 PROCEDURE — 77030010516 HC APPL HEMA CLP TELE -B: Performed by: THORACIC SURGERY (CARDIOTHORACIC VASCULAR SURGERY)

## 2019-05-21 PROCEDURE — 86923 COMPATIBILITY TEST ELECTRIC: CPT

## 2019-05-21 PROCEDURE — 93451 RIGHT HEART CATH: CPT | Performed by: INTERNAL MEDICINE

## 2019-05-21 PROCEDURE — 5A0221D ASSISTANCE WITH CARDIAC OUTPUT USING IMPELLER PUMP, CONTINUOUS: ICD-10-PCS | Performed by: THORACIC SURGERY (CARDIOTHORACIC VASCULAR SURGERY)

## 2019-05-21 PROCEDURE — 74011000250 HC RX REV CODE- 250: Performed by: HOSPITALIST

## 2019-05-21 PROCEDURE — 77030004532 HC CATH ANGI DX IMP BSC -A: Performed by: THORACIC SURGERY (CARDIOTHORACIC VASCULAR SURGERY)

## 2019-05-21 PROCEDURE — 77030008467 HC STPLR SKN COVD -B: Performed by: THORACIC SURGERY (CARDIOTHORACIC VASCULAR SURGERY)

## 2019-05-21 PROCEDURE — 77030002986 HC SUT PROL J&J -A: Performed by: THORACIC SURGERY (CARDIOTHORACIC VASCULAR SURGERY)

## 2019-05-21 PROCEDURE — 74011250637 HC RX REV CODE- 250/637: Performed by: HOSPITALIST

## 2019-05-21 PROCEDURE — 77030010505 HC ADH BIOGLU CRYO -F: Performed by: THORACIC SURGERY (CARDIOTHORACIC VASCULAR SURGERY)

## 2019-05-21 PROCEDURE — 77030011220 HC DEV ELECSURG COVD -B: Performed by: THORACIC SURGERY (CARDIOTHORACIC VASCULAR SURGERY)

## 2019-05-21 PROCEDURE — 77030013744: Performed by: INTERNAL MEDICINE

## 2019-05-21 PROCEDURE — 74011000250 HC RX REV CODE- 250: Performed by: THORACIC SURGERY (CARDIOTHORACIC VASCULAR SURGERY)

## 2019-05-21 PROCEDURE — 77030008684 HC TU ET CUF COVD -B: Performed by: ANESTHESIOLOGY

## 2019-05-21 PROCEDURE — 82140 ASSAY OF AMMONIA: CPT

## 2019-05-21 PROCEDURE — 77030011640 HC PAD GRND REM COVD -A: Performed by: THORACIC SURGERY (CARDIOTHORACIC VASCULAR SURGERY)

## 2019-05-21 PROCEDURE — 84146 ASSAY OF PROLACTIN: CPT

## 2019-05-21 PROCEDURE — 77030034868 HC PMP CARD PERC IMPELLA RP ABIM -L: Performed by: THORACIC SURGERY (CARDIOTHORACIC VASCULAR SURGERY)

## 2019-05-21 PROCEDURE — 74011000258 HC RX REV CODE- 258: Performed by: INTERNAL MEDICINE

## 2019-05-21 PROCEDURE — 77030029641 HC PMP CARD PERC IMPELLA CP ABIM -L: Performed by: THORACIC SURGERY (CARDIOTHORACIC VASCULAR SURGERY)

## 2019-05-21 PROCEDURE — 86141 C-REACTIVE PROTEIN HS: CPT

## 2019-05-21 PROCEDURE — B246ZZ4 ULTRASONOGRAPHY OF RIGHT AND LEFT HEART, TRANSESOPHAGEAL: ICD-10-PCS | Performed by: THORACIC SURGERY (CARDIOTHORACIC VASCULAR SURGERY)

## 2019-05-21 PROCEDURE — 77030014008 HC SPNG HEMSTAT J&J -C: Performed by: THORACIC SURGERY (CARDIOTHORACIC VASCULAR SURGERY)

## 2019-05-21 PROCEDURE — 77030002996 HC SUT SLK J&J -A: Performed by: THORACIC SURGERY (CARDIOTHORACIC VASCULAR SURGERY)

## 2019-05-21 PROCEDURE — 74011000258 HC RX REV CODE- 258: Performed by: THORACIC SURGERY (CARDIOTHORACIC VASCULAR SURGERY)

## 2019-05-21 PROCEDURE — 84439 ASSAY OF FREE THYROXINE: CPT

## 2019-05-21 PROCEDURE — 77030026438 HC STYL ET INTUB CARD -A: Performed by: ANESTHESIOLOGY

## 2019-05-21 PROCEDURE — 83540 ASSAY OF IRON: CPT

## 2019-05-21 PROCEDURE — 74011250636 HC RX REV CODE- 250/636: Performed by: PHYSICIAN ASSISTANT

## 2019-05-21 PROCEDURE — 77030014491 HC PLEDG PTFE BARD -A: Performed by: THORACIC SURGERY (CARDIOTHORACIC VASCULAR SURGERY)

## 2019-05-21 PROCEDURE — 77030011256 HC DRSG MEPILEX <16IN NO BORD MOLN -A: Performed by: THORACIC SURGERY (CARDIOTHORACIC VASCULAR SURGERY)

## 2019-05-21 PROCEDURE — 80076 HEPATIC FUNCTION PANEL: CPT

## 2019-05-21 PROCEDURE — 65610000003 HC RM ICU SURGICAL

## 2019-05-21 PROCEDURE — 76060000034 HC ANESTHESIA 1.5 TO 2 HR: Performed by: THORACIC SURGERY (CARDIOTHORACIC VASCULAR SURGERY)

## 2019-05-21 PROCEDURE — 86038 ANTINUCLEAR ANTIBODIES: CPT

## 2019-05-21 PROCEDURE — 99153 MOD SED SAME PHYS/QHP EA: CPT | Performed by: INTERNAL MEDICINE

## 2019-05-21 DEVICE — HEMASHIELD PLATINUM WOVEN STRAIGHT DOUBLE VELOUR VASCULAR GRAFT WITH GRAFT SIZER ACCESSORY
Type: IMPLANTABLE DEVICE | Site: SUBCLAVIAN | Status: FUNCTIONAL
Brand: HEMASHIELD

## 2019-05-21 RX ORDER — GUAIFENESIN 100 MG/5ML
81 LIQUID (ML) ORAL DAILY
Status: DISCONTINUED | OUTPATIENT
Start: 2019-05-22 | End: 2019-05-21 | Stop reason: HOSPADM

## 2019-05-21 RX ORDER — CEFAZOLIN SODIUM 1 G/3ML
INJECTION, POWDER, FOR SOLUTION INTRAMUSCULAR; INTRAVENOUS AS NEEDED
Status: DISCONTINUED | OUTPATIENT
Start: 2019-05-21 | End: 2019-05-21 | Stop reason: HOSPADM

## 2019-05-21 RX ORDER — ASPIRIN 300 MG/1
300 SUPPOSITORY RECTAL DAILY
Status: DISCONTINUED | OUTPATIENT
Start: 2019-05-22 | End: 2019-05-22

## 2019-05-21 RX ORDER — DOBUTAMINE HYDROCHLORIDE 200 MG/100ML
0-10 INJECTION INTRAVENOUS
Status: DISCONTINUED | OUTPATIENT
Start: 2019-05-21 | End: 2019-05-22

## 2019-05-21 RX ORDER — SODIUM CHLORIDE 9 MG/ML
INJECTION, SOLUTION INTRAVENOUS
Status: DISCONTINUED | OUTPATIENT
Start: 2019-05-21 | End: 2019-05-21 | Stop reason: HOSPADM

## 2019-05-21 RX ORDER — DIAZEPAM 5 MG/1
5 TABLET ORAL ONCE
Status: DISCONTINUED | OUTPATIENT
Start: 2019-05-21 | End: 2019-05-21

## 2019-05-21 RX ORDER — FENTANYL CITRATE 50 UG/ML
INJECTION, SOLUTION INTRAMUSCULAR; INTRAVENOUS AS NEEDED
Status: DISCONTINUED | OUTPATIENT
Start: 2019-05-21 | End: 2019-05-21 | Stop reason: HOSPADM

## 2019-05-21 RX ORDER — SODIUM CHLORIDE 0.9 % (FLUSH) 0.9 %
5-40 SYRINGE (ML) INJECTION AS NEEDED
Status: DISCONTINUED | OUTPATIENT
Start: 2019-05-21 | End: 2019-05-21 | Stop reason: HOSPADM

## 2019-05-21 RX ORDER — DEXTROSE MONOHYDRATE 50 MG/ML
4-20 INJECTION, SOLUTION INTRAVENOUS CONTINUOUS
Status: DISCONTINUED | OUTPATIENT
Start: 2019-05-21 | End: 2019-05-22

## 2019-05-21 RX ORDER — LEVOFLOXACIN 250 MG/1
250 TABLET ORAL EVERY 24 HOURS
Status: DISCONTINUED | OUTPATIENT
Start: 2019-05-21 | End: 2019-05-21

## 2019-05-21 RX ORDER — SODIUM CHLORIDE 9 MG/ML
9 INJECTION, SOLUTION INTRAVENOUS CONTINUOUS
Status: DISCONTINUED | OUTPATIENT
Start: 2019-05-21 | End: 2019-06-04 | Stop reason: HOSPADM

## 2019-05-21 RX ORDER — HYDROCORTISONE SODIUM SUCCINATE 100 MG/2ML
100 INJECTION, POWDER, FOR SOLUTION INTRAMUSCULAR; INTRAVENOUS EVERY 8 HOURS
Status: DISCONTINUED | OUTPATIENT
Start: 2019-05-21 | End: 2019-05-24

## 2019-05-21 RX ORDER — MIDAZOLAM HYDROCHLORIDE 1 MG/ML
INJECTION, SOLUTION INTRAMUSCULAR; INTRAVENOUS AS NEEDED
Status: DISCONTINUED | OUTPATIENT
Start: 2019-05-21 | End: 2019-05-21 | Stop reason: HOSPADM

## 2019-05-21 RX ORDER — SODIUM CHLORIDE 9 MG/ML
250 INJECTION, SOLUTION INTRAVENOUS AS NEEDED
Status: DISCONTINUED | OUTPATIENT
Start: 2019-05-21 | End: 2019-05-22

## 2019-05-21 RX ORDER — SODIUM CHLORIDE 9 MG/ML
1000 INJECTION, SOLUTION INTRAVENOUS CONTINUOUS
Status: DISCONTINUED | OUTPATIENT
Start: 2019-05-22 | End: 2019-05-21 | Stop reason: HOSPADM

## 2019-05-21 RX ORDER — GUAIFENESIN 600 MG/1
600 TABLET, EXTENDED RELEASE ORAL
Status: DISCONTINUED | OUTPATIENT
Start: 2019-05-21 | End: 2019-05-30

## 2019-05-21 RX ORDER — HEPARIN SODIUM 200 [USP'U]/100ML
INJECTION, SOLUTION INTRAVENOUS
Status: COMPLETED | OUTPATIENT
Start: 2019-05-21 | End: 2019-05-21

## 2019-05-21 RX ORDER — AMOXICILLIN 250 MG
1 CAPSULE ORAL DAILY
Status: DISCONTINUED | OUTPATIENT
Start: 2019-05-21 | End: 2019-05-21

## 2019-05-21 RX ORDER — ETOMIDATE 2 MG/ML
INJECTION INTRAVENOUS AS NEEDED
Status: DISCONTINUED | OUTPATIENT
Start: 2019-05-21 | End: 2019-05-21 | Stop reason: HOSPADM

## 2019-05-21 RX ORDER — CEFAZOLIN SODIUM/WATER 2 G/20 ML
2 SYRINGE (ML) INTRAVENOUS EVERY 8 HOURS
Status: DISCONTINUED | OUTPATIENT
Start: 2019-05-22 | End: 2019-05-22

## 2019-05-21 RX ORDER — HEPARIN SODIUM 1000 [USP'U]/ML
INJECTION, SOLUTION INTRAVENOUS; SUBCUTANEOUS AS NEEDED
Status: DISCONTINUED | OUTPATIENT
Start: 2019-05-21 | End: 2019-05-21 | Stop reason: HOSPADM

## 2019-05-21 RX ORDER — DOBUTAMINE HYDROCHLORIDE 200 MG/100ML
INJECTION INTRAVENOUS
Status: DISCONTINUED | OUTPATIENT
Start: 2019-05-21 | End: 2019-05-21 | Stop reason: HOSPADM

## 2019-05-21 RX ORDER — ROCURONIUM BROMIDE 10 MG/ML
INJECTION, SOLUTION INTRAVENOUS AS NEEDED
Status: DISCONTINUED | OUTPATIENT
Start: 2019-05-21 | End: 2019-05-21 | Stop reason: HOSPADM

## 2019-05-21 RX ORDER — SODIUM CHLORIDE 0.9 % (FLUSH) 0.9 %
5-40 SYRINGE (ML) INJECTION EVERY 8 HOURS
Status: DISCONTINUED | OUTPATIENT
Start: 2019-05-21 | End: 2019-05-21 | Stop reason: HOSPADM

## 2019-05-21 RX ORDER — CEFAZOLIN SODIUM IN 0.9 % NACL 2 G/100 ML
PLASTIC BAG, INJECTION (ML) INTRAVENOUS AS NEEDED
Status: DISCONTINUED | OUTPATIENT
Start: 2019-05-21 | End: 2019-05-21 | Stop reason: HOSPADM

## 2019-05-21 RX ORDER — LIDOCAINE HYDROCHLORIDE 10 MG/ML
INJECTION INFILTRATION; PERINEURAL AS NEEDED
Status: DISCONTINUED | OUTPATIENT
Start: 2019-05-21 | End: 2019-05-21 | Stop reason: HOSPADM

## 2019-05-21 RX ADMIN — OXYCODONE HYDROCHLORIDE AND ACETAMINOPHEN 1 TABLET: 5; 325 TABLET ORAL at 12:46

## 2019-05-21 RX ADMIN — SODIUM CHLORIDE 0.7 MCG/KG/HR: 900 INJECTION, SOLUTION INTRAVENOUS at 16:39

## 2019-05-21 RX ADMIN — DEXTROSE MONOHYDRATE 14.2 ML/HR: 5 INJECTION, SOLUTION INTRAVENOUS at 17:00

## 2019-05-21 RX ADMIN — FENTANYL CITRATE 50 MCG: 50 INJECTION, SOLUTION INTRAMUSCULAR; INTRAVENOUS at 15:42

## 2019-05-21 RX ADMIN — SODIUM CHLORIDE 60 ML/HR: 900 INJECTION, SOLUTION INTRAVENOUS at 12:15

## 2019-05-21 RX ADMIN — ASPIRIN 325 MG: 325 TABLET ORAL at 08:57

## 2019-05-21 RX ADMIN — HYDROCORTISONE SODIUM SUCCINATE 100 MG: 100 INJECTION, POWDER, FOR SOLUTION INTRAMUSCULAR; INTRAVENOUS at 20:13

## 2019-05-21 RX ADMIN — ROCURONIUM BROMIDE 30 MG: 10 INJECTION, SOLUTION INTRAVENOUS at 15:31

## 2019-05-21 RX ADMIN — ETOMIDATE 26 MG: 2 INJECTION INTRAVENOUS at 15:21

## 2019-05-21 RX ADMIN — MORPHINE SULFATE 4 MG: 4 INJECTION INTRAVENOUS at 17:33

## 2019-05-21 RX ADMIN — HEPARIN SODIUM 1000 UNITS: 1000 INJECTION, SOLUTION INTRAVENOUS; SUBCUTANEOUS at 16:04

## 2019-05-21 RX ADMIN — DOBUTAMINE HYDROCHLORIDE 6 MCG/KG/MIN: 200 INJECTION INTRAVENOUS at 15:10

## 2019-05-21 RX ADMIN — Medication 2 G: at 15:40

## 2019-05-21 RX ADMIN — INSULIN LISPRO 2 UNITS: 100 INJECTION, SOLUTION INTRAVENOUS; SUBCUTANEOUS at 06:57

## 2019-05-21 RX ADMIN — SODIUM CHLORIDE 3 ML/HR: 900 INJECTION, SOLUTION INTRAVENOUS at 12:15

## 2019-05-21 RX ADMIN — SENNOSIDES, DOCUSATE SODIUM 1 TABLET: 50; 8.6 TABLET, FILM COATED ORAL at 12:46

## 2019-05-21 RX ADMIN — BUDESONIDE 250 MCG: 0.25 INHALANT RESPIRATORY (INHALATION) at 09:19

## 2019-05-21 RX ADMIN — HEPARIN SODIUM 1000 UNITS: 1000 INJECTION, SOLUTION INTRAVENOUS; SUBCUTANEOUS at 16:13

## 2019-05-21 RX ADMIN — INSULIN LISPRO 23 UNITS: 100 INJECTION, SOLUTION INTRAVENOUS; SUBCUTANEOUS at 12:46

## 2019-05-21 RX ADMIN — FENTANYL CITRATE 50 MCG: 50 INJECTION, SOLUTION INTRAMUSCULAR; INTRAVENOUS at 16:04

## 2019-05-21 RX ADMIN — BUDESONIDE 250 MCG: 0.25 INHALANT RESPIRATORY (INHALATION) at 22:09

## 2019-05-21 RX ADMIN — VASOPRESSIN 0.02 UNITS/MIN: 20 INJECTION INTRAVENOUS at 14:50

## 2019-05-21 RX ADMIN — HEPARIN SODIUM 3000 UNITS: 1000 INJECTION, SOLUTION INTRAVENOUS; SUBCUTANEOUS at 15:54

## 2019-05-21 RX ADMIN — SODIUM CHLORIDE 2.7 UNITS/HR: 900 INJECTION, SOLUTION INTRAVENOUS at 18:13

## 2019-05-21 RX ADMIN — OXYCODONE HYDROCHLORIDE AND ACETAMINOPHEN 1 TABLET: 5; 325 TABLET ORAL at 08:03

## 2019-05-21 RX ADMIN — HEPARIN SODIUM 5000 UNITS: 5000 INJECTION INTRAVENOUS; SUBCUTANEOUS at 02:38

## 2019-05-21 RX ADMIN — ROCURONIUM BROMIDE 20 MG: 10 INJECTION, SOLUTION INTRAVENOUS at 15:50

## 2019-05-21 RX ADMIN — DOBUTAMINE IN DEXTROSE 2.5 MCG/KG/MIN: 200 INJECTION, SOLUTION INTRAVENOUS at 14:17

## 2019-05-21 RX ADMIN — COSYNTROPIN 0.25 MG: 0.25 INJECTION, POWDER, LYOPHILIZED, FOR SOLUTION INTRAMUSCULAR; INTRAVENOUS at 18:33

## 2019-05-21 RX ADMIN — MORPHINE SULFATE 4 MG: 4 INJECTION INTRAVENOUS at 12:17

## 2019-05-21 RX ADMIN — GABAPENTIN 800 MG: 100 CAPSULE ORAL at 08:57

## 2019-05-21 RX ADMIN — PANTOPRAZOLE SODIUM 40 MG: 40 TABLET, DELAYED RELEASE ORAL at 06:57

## 2019-05-21 RX ADMIN — Medication 10 ML: at 06:53

## 2019-05-21 RX ADMIN — SODIUM CHLORIDE 5 MG/HR: 900 INJECTION, SOLUTION INTRAVENOUS at 20:46

## 2019-05-21 RX ADMIN — OXYCODONE HYDROCHLORIDE AND ACETAMINOPHEN 1 TABLET: 5; 325 TABLET ORAL at 00:03

## 2019-05-21 RX ADMIN — MORPHINE SULFATE 4 MG: 4 INJECTION INTRAVENOUS at 21:04

## 2019-05-21 RX ADMIN — AMIODARONE HYDROCHLORIDE 1 MG/MIN: 50 INJECTION, SOLUTION INTRAVENOUS at 16:43

## 2019-05-21 RX ADMIN — AMIODARONE HYDROCHLORIDE 1 MG/MIN: 50 INJECTION, SOLUTION INTRAVENOUS at 22:28

## 2019-05-21 RX ADMIN — SODIUM CHLORIDE: 9 INJECTION, SOLUTION INTRAVENOUS at 15:10

## 2019-05-21 NOTE — PROGRESS NOTES
Hospitalist Progress Note  Cindy Beauchamp MD  Answering service: [phone] from in house phone        Date of Service:  2019  NAME:  Adam Aparicio  :  1960  MRN:  [id number]  PCP: Roxanna De La Rosa MD    Chief Complaint:   Chief Complaint   Patient presents with    Shortness of Breath         Admission Summary:     Adam Aparicio is a 66 y.o. female who presented with SOB    Interval history / Subjective:   Patient seen for Follow up of NSTEMI    Patient seen and examined by the bedside  Labs, images and notes reviewed  Patient is comfortable, Denies any chest pain, No fevers or chills, No nausea or vomiting  Still on Dopamine gtt, BP improving. Discussed with nursing staff, no acute issues overnight, orders reviewed. Assessment & Plan:     - NSTEMI, POA  S/p cardiac cath: Mild to Mod CAD, diagnostic, low EF < 25% with severe hypokinesis  On ASA, Lopressors with holding parameters, statins    - Cardiogenic shock  Improving  IVF for now  On Dopamine gtt, cards to manage. GDMT to be started once patient is off Dopamine. Cards note reviewed, plans for repeat Echo on 24-48 hours, RHC if no improvement. - Takotsubo cardiomyopathy  As per cardiac cath, POA  Management as above    - COPD exacerbation  Improving, cont steroids, nebs RTC, Mucinex, inhaled steroids, Levaquin  CT chest reviewed.  Pt should get annual lose dose screening chest CT    - Pre-diabetes  Patient has history of gestational Diabetes in the setting of steroids  HbA1c 6.4  Cont SSI, accu-checks TID AC, diabetic diet    -Hypokalemia: replete, repeat bmp in am  -GERD: PPI  -Active smoker: cont on Nicotine patch  -CKD 3:Stable, repeat bmp in am, avoid nephrotoxins, renal dosing of meds  -chronic anxiety: cont home meds  -Opiate use, chronic: percocet PRN    Code status: Full Code    DVT prophylaxis: Heparin SQ    Care Plan discussed with: Patient/Family and Nurse    Disposition: TBD      Addendum: patient's care assumed by F team, hospitalist signing off, please call if questions  Hospital Problems  Date Reviewed: 2011          Codes Class Noted POA    NSTEMI (non-ST elevated myocardial infarction) [facility]) ICD-10-CM: I21.4  ICD-9-CM: 410.70  2019 Unknown                Review of Systems:   A comprehensive review of systems was negative. Physical Examination:       General appearance: alert, no distress, appears stated age  Throat: normal findings: buccal mucosa normal  Lungs: decreased AE through out with distant breath sounds, no wheezing, no labored breathing  Heart: regular rate and rhythm  Abdomen: soft, non-tender. Bowel sounds normal. No masses,  no organomegaly  Extremities: extremities normal, atraumatic, no cyanosis or edema  Pulses: 2+ and symmetric  Neurologic: Alert and oriented X 3, normal strength and tone. Vital Signs:    Last 24hrs VS reviewed since prior progress note. Most recent are:    Visit Vitals  BP (!) 85/61 (BP 1 Location: Left arm, BP Patient Position: At rest)   Pulse 92   Temp 99 °F (37.2 °C)   Resp 24   Ht 5' 4\" (1.626 m)   Wt 57.5 kg (126 lb 12.2 oz)   SpO2 95%   BMI 21.76 kg/m²         Intake/Output Summary (Last 24 hours) at 2019 0833  Last data filed at 2019 0700  Gross per 24 hour   Intake 3509.6 ml   Output 3600 ml   Net -90.4 ml        Tmax:  Temp (24hrs), Av.6 °F (37 °C), Min:97.9 °F (36.6 °C), Max:99.2 °F (37.3 °C)      Data Review:     Ct Chest Wo Cont    Result Date: 2019  CT CHEST WITHOUT CONTRAST. 2019 2:35 PM INDICATION: Dyspnea, cough. COMPARISON: CT abdomen pelvis 10/11/2017. TECHNIQUE: CT of the chest was performed without contrast. Coronal and sagittal reconstructions were performed. CT dose reduction was achieved through use of a standardized protocol tailored for this examination and automatic exposure control for dose modulation. Adaptive statistical iterative reconstruction (ASIR) was utilized.  FINDINGS: Panlobular emphysema in the upper lobes is mild. Mild endobronchial mucous plugging in the basilar segments of the right lobe is similar to 2017. A sub-6 mm, subpleural nodule in the anterior segment of the right middle lobe (3-36) is fat density. Minimal linear scarring is associated in the lung immediately deep to it. The central airways are patent. No pneumothorax or pleural effusion. The heart size is normal. Left anterior descending coronary calcifications are moderate. No thoracic lymphadenopathy. Post cholecystectomy. Incidental note is made of right renal cysts. IMPRESSION: 1. Mild right lower lobe mucus plugging. 2. Mild emphysema. 3. Annual low dose chest CT is recommended for lung carcinoma screening if the patient meets criteria. Criteria for lung carcinoma screening with low-dose CT: 3 46-80 years old. 2. No signs or symptoms of lung carcinoma. 3. 30 pack-year or greater smoking history. 4. Current smoker or quit within the last 15 years. 5. Written order from a health professional following lung cancer screening counseling that attests to shared decision-making having taken place before their first screening CT. www.acr.org/Quality-Safety/Resources/LungRADS www. USpreventativeservicestaskforce.org/page/document/updatesummaryfinal/lung-ca cer-screening     Xr Chest Port    Result Date: 5/18/2019  PORTABLE CHEST RADIOGRAPH/S: 5/18/2019 11:54 AM INDICATION: Pneumonia, COPD, dyspnea. COMPARISON: 9/10/2015, 6/9/2013, 2/15/2006. TECHNIQUE: Portable frontal upright radiograph/s of the chest. FINDINGS: The lungs are clear. The central airways are patent. No pneumothorax or pleural effusion. IMPRESSION: Clear lungs.     Lab Results   Component Value Date/Time    Specimen Description: URINE 04/10/2014 11:31 PM     Lab Results   Component Value Date/Time    Culture result: MIXED UROGENITAL ALAN ISOLATED 04/10/2014 11:31 PM     All Micro Results     None          Labs:     Recent Labs     05/21/19  0347 05/20/19  0318   WBC 7. 9 12.6*   HGB 11.3* 11.5   HCT 35.9 37.0    284     Recent Labs     05/21/19  0347 05/20/19  0318 05/19/19  0811    142 139   K 4.6 3.4* 3.6    107 105   CO2 27 27 27   BUN 16 19 18   CREA 1.13* 1.35* 1.27*   * 122* 173*   CA 8.7 8.3* 8.7     No results for input(s): SGOT, GPT, ALT, AP, TBIL, TBILI, TP, ALB, GLOB, GGT, AML, LPSE in the last 72 hours. No lab exists for component: AMYP, HLPSE  Recent Labs     05/19/19  0811   INR 1.2*   PTP 11.9*      No results for input(s): FE, TIBC, PSAT, FERR in the last 72 hours. No results found for: FOL, RBCF   No results for input(s): PH, PCO2, PO2 in the last 72 hours.   Recent Labs     05/19/19  0811 05/18/19 2012 05/18/19  1134   TROIQ 2.99* 4.62* 11.90*     Lab Results   Component Value Date/Time    Cholesterol, total 139 05/19/2019 08:11 AM    HDL Cholesterol 46 05/19/2019 08:11 AM    LDL, calculated 77.6 05/19/2019 08:11 AM    Triglyceride 77 05/19/2019 08:11 AM    CHOL/HDL Ratio 3.0 05/19/2019 08:11 AM     Lab Results   Component Value Date/Time    Glucose (POC) 148 (H) 05/21/2019 06:54 AM    Glucose (POC) 165 (H) 05/20/2019 09:32 PM    Glucose (POC) 127 (H) 05/20/2019 05:33 PM    Glucose (POC) 133 (H) 05/20/2019 12:47 PM    Glucose (POC) 143 (H) 05/20/2019 06:53 AM     Lab Results   Component Value Date/Time    Color YELLOW/STRAW 10/11/2017 09:19 PM    Appearance CLEAR 10/11/2017 09:19 PM    Specific gravity 1.016 10/11/2017 09:19 PM    pH (UA) 7.0 10/11/2017 09:19 PM    Protein NEGATIVE  10/11/2017 09:19 PM    Glucose NEGATIVE  10/11/2017 09:19 PM    Ketone NEGATIVE  10/11/2017 09:19 PM    Bilirubin NEGATIVE  10/11/2017 09:19 PM    Urobilinogen 1.0 10/11/2017 09:19 PM    Nitrites NEGATIVE  10/11/2017 09:19 PM    Leukocyte Esterase NEGATIVE  10/11/2017 09:19 PM    Epithelial cells FEW 10/11/2017 09:19 PM    Bacteria NEGATIVE  10/11/2017 09:19 PM    WBC 0-4 10/11/2017 09:19 PM    RBC 5-10 10/11/2017 09:19 PM         Medications Reviewed: Current Facility-Administered Medications   Medication Dose Route Frequency    albuterol-ipratropium (DUO-NEB) 2.5 MG-0.5 MG/3 ML  3 mL Nebulization Q4H PRN    gabapentin (NEURONTIN) capsule 800 mg  800 mg Oral BID    glucose chewable tablet 16 g  4 Tab Oral PRN    dextrose (D50W) injection syrg 12.5-25 g  25-50 mL IntraVENous PRN    glucagon (GLUCAGEN) injection 1 mg  1 mg IntraMUSCular PRN    insulin lispro (HUMALOG) injection   SubCUTAneous AC&HS    heparin (porcine) injection 5,000 Units  5,000 Units SubCUTAneous Q8H    benzocaine-zinc cl-benzalkonium cl (ORAJEL) 20-0.1-0.02 % mucosal gel   Oral PRN    aspirin tablet 325 mg  325 mg Oral DAILY    amitriptyline (ELAVIL) tablet 100 mg  100 mg Oral QHS    clonazePAM (KlonoPIN) tablet 0.5 mg  0.5 mg Oral Q12H PRN    sodium chloride (NS) flush 5-40 mL  5-40 mL IntraVENous Q8H    sodium chloride (NS) flush 5-40 mL  5-40 mL IntraVENous PRN    nitroglycerin (NITROSTAT) tablet 0.4 mg  0.4 mg SubLINGual Q5MIN PRN    naloxone (NARCAN) injection 0.4 mg  0.4 mg IntraVENous PRN    oxyCODONE-acetaminophen (PERCOCET) 5-325 mg per tablet 1 Tab  1 Tab Oral Q4H PRN    morphine injection 4 mg  4 mg IntraVENous Q4H PRN    pantoprazole (PROTONIX) tablet 40 mg  40 mg Oral ACB&D    budesonide (PULMICORT) 250 mcg/2ml nebulizer susp  250 mcg Nebulization BID RT    nicotine (NICODERM CQ) 14 mg/24 hr patch 1 Patch  1 Patch TransDERmal DAILY    atorvastatin (LIPITOR) tablet 20 mg  20 mg Oral QHS    acetaminophen (TYLENOL) tablet 650 mg  650 mg Oral Q4H PRN    DOPamine (INTROPIN) 800 mg in dextrose 5% 250 mL infusion  0-20 mcg/kg/min IntraVENous TITRATE    guaiFENesin ER (MUCINEX) tablet 600 mg  600 mg Oral Q12H    levoFLOXacin (LEVAQUIN) 250 mg in D5W IVPB  250 mg IntraVENous Q24H    0.9% sodium chloride infusion  60 mL/hr IntraVENous CONTINUOUS     ______________________________________________________________________  EXPECTED LENGTH OF STAY: - - -  ACTUAL LENGTH OF STAY:          3                 Alina Willams MD

## 2019-05-21 NOTE — PERIOP NOTES
Patient arrived to the operating room via stretcher. All wheels locked and patient transferred to the OR table with assistance of four staff members. Candance Hugelucretia MTRE warming machine on bed. Temperature set at 37 C and adjusted as needed. Safety strap across upper body until time of positioning and after drapes removed prior to transfer. After the induction of anesthesia and intubation 16fr temperature santiago catheter was inserted with no difficulties. Urine output and temperature monitored during case by all providers. Patient positioned with assistance of all providers. Head resting in proper alignment on gel doughnut. Gel Shoulder roll placed. Arms tucked as sides. Each arm was wrapped in one step arm protectors. All pressure points/IV lines/stopcocks padded with foam. Thumbs in proper alignment and ulnar padding in place.

## 2019-05-21 NOTE — PROGRESS NOTES
Interventional Cardiology Progress Note    Name: Bronson Villanueva  : 1960  Date: 2019      65 yo WF with acute stress cardiomyopathy, declining hemodynamics, now in cardiogenic shock. Appreciate Ashley Knutson and Lisbet's expert assistance. Impella placed with improved hemodynamics and output. Continue full support in ICU.       Eliel Moreno MD  [phone]  19

## 2019-05-21 NOTE — PROGRESS NOTES
Problem: Unstable angina/NSTEMI: Day 2  Goal: *Hemodynamically stable  Outcome: Progressing Towards Goal  Note:   Weaning dopamine gtt as pt tolerates. Goal: *Lungs clear or at baseline  Outcome: Progressing Towards Goal  Note:   Lungs clear but diminished. Pulmonary hygiene (cough, deep breathing, IS) encouraged. Problem: Heart Failure: Day 3  Goal: *Hemodynamically stable  Outcome: Progressing Towards Goal  Goal: *Demonstrates progressive activity  Outcome: Progressing Towards Goal     Problem: Falls - Risk of  Goal: *Absence of Falls  Description  Document Derick Malik Fall Risk and appropriate interventions in the flowsheet.   Outcome: Progressing Towards Goal  Note:   Fall Risk Interventions:  Medication Interventions: Evaluate medications/consider consulting pharmacy, Teach patient to arise slowly    Elimination Interventions: Call light in reach, Patient to call for help with toileting needs, Toilet paper/wipes in reach, Toileting schedule/hourly rounds

## 2019-05-21 NOTE — PROGRESS NOTES
1045: TRANSFER - IN REPORT:    Verbal report received from ANDRE RN (name) on Claudetta Noe  being received from CVICU(unit) for ordered procedure      Report consisted of patients Situation, Background, Assessment and   Recommendations(SBAR). Information from the following report(s) SBAR was reviewed with the receiving nurse. Opportunity for questions and clarification was provided. Assessment completed upon patients arrival to unit and care assumed. 1050:   Cardiac Cath Lab Procedure Area Arrival Note:    Claudetta Noe arrived to Cardiac Cath Lab, Procedure Area. Patient identifiers verified with NAME and DATE OF BIRTH. Procedure verified with patient. Consent forms verified. Allergies verified. Patient informed of procedure and plan of care. Questions answered with review. Patient voiced understanding of procedure and plan of care. Patient on cardiac monitor, non-invasive blood pressure, SPO2 monitor. On RA placed on O2 @ 2 lpm via NC.  IV of NS on pump at 60 ml/hr, Dopamine @5mcg. Patient status doing well without problems. Patient is A&Ox 4. Patient reports no pain. Patient medicated during procedure with orders obtained and verified by Dr. Miki Oppenheim. Refer to patients Cardiac Cath Lab PROCEDURE REPORT for vital signs, assessment, status, and response during procedure, printed at end of case. Printed report on chart or scanned into chart. 1145: TRANSFER - OUT REPORT:    Verbal report given to ANDRE RN (name) on Claudetta Noe  being transferred to CVICU (unit) for routine post - op       Report consisted of patients Situation, Background, Assessment and   Recommendations(SBAR). Information from the following report(s) Procedure Summary and MAR was reviewed with the receiving nurse. Lines:   Peripheral IV 05/19/19 Anterior; Left Forearm (Active)   Site Assessment Clean, dry, & intact 5/21/2019  8:00 AM   Phlebitis Assessment 0 5/21/2019  8:00 AM   Infiltration Assessment 0 5/21/2019 8:00 AM   Dressing Status Clean, dry, & intact 5/21/2019  8:00 AM   Dressing Type Transparent;Tape 5/21/2019  8:00 AM   Hub Color/Line Status Pink; Infusing;Flushed;Patent 5/21/2019  8:00 AM   Action Taken Open ports on tubing capped 5/21/2019  8:00 AM   Alcohol Cap Used Yes 5/21/2019  8:00 AM        Opportunity for questions and clarification was provided.       Patient transported with:   Monitor  O2 @ 2 liters  Registered Nurse  [facility]

## 2019-05-21 NOTE — ANESTHESIA PROCEDURE NOTES
Arterial Line Placement    Start time: 5/21/2019 3:13 PM  End time: 5/21/2019 3:22 PM  Performed by: Harini Acosta MD  Authorized by: Harini Acosta MD     Pre-Procedure  Indications:  Arterial pressure monitoring and blood sampling  Preanesthetic Checklist: patient identified, risks and benefits discussed, anesthesia consent, site marked, patient being monitored, timeout performed and patient being monitored      Procedure:   Prep:  Chlorhexidine  Seldinger Technique?: Yes    Orientation:  Left  Location:  Radial artery  Catheter size:  20 G  Number of attempts:  1    Assessment:   Post-procedure:  Line secured and sterile dressing applied  Patient Tolerance:  Patient tolerated the procedure well with no immediate complications

## 2019-05-21 NOTE — PROGRESS NOTES
1950: Received report from Belen Ang RN. Drips dual verified. Assumed care of pt, assessment performed. [id number]: Dopamine increased to 6mcg/kg/min for MAPs <65.     0745: Bedside shift change report given to ANDRE Siddiqui RN (oncoming nurse) by Princess Crews RN (offgoing nurse). Report included the following information SBAR, ED Summary, Procedure Summary, Intake/Output, MAR, Recent Results and Cardiac Rhythm NSR.

## 2019-05-21 NOTE — BRIEF OP NOTE
BRIEF OP NOTE  Pre-Op Diagnosis: HF    Post-Op Diagnosis: HF    Procedure:   IMPELLA CP INSERTION via RIGHT SUBCLAVIAN artery     Surgeon: Reuben Mcgovern MD    Assistant(s): Rj Zamorano PA-C    Anesthesia: General     Infusions: Amiodarone, precedex, insulin, paresh    Estimated Blood Loss:  50ml    Cell Saver:  0ml    Complications: None    Findings: HF    Implants:   Implant Name Type Inv.  Item Serial No.  Lot No. LRB No. Used Action   GRAFT HEMSHLD PLAT [id number] --  - [id number]  GRAFT HEMSHLD PLAT [id number] --  [id number] GETINGE [employer] MAQUET CARDIOVASCLR [id number] Right 1 Implanted   BIOGLUE   N/A  [id number] Right 1 Implanted

## 2019-05-21 NOTE — PROGRESS NOTES
Cardiac Surgery Care Coordinator- Met briefly with Jerome Freed, reviewed plan of care and offered emotional support. Will continue to follow. 1630- Unable to locate family at this time. Notified surgeon.  Soraya Leija RN

## 2019-05-21 NOTE — ANESTHESIA PROCEDURE NOTES
ANTONIETA        Procedure Details: probe placement, image aquisition & interpretation    Site marked  Risks and benefits discussed with the patient and plans are to proceed    Procedure Note    Performed by: Douglas Akers MD  Authorized by: Douglas Akers MD       Indications: assessment of ascending aorta and assessment of surgical repair  Modalities: 2D, CF, CWD, contrast, PWD  Probe Type: multiplane  Insertion: atraumatic  Patient Status: intubated    Echocardiographic and Doppler Measurements   Aorta  Size  Diam(cm)  Dissection PlaqueThick(mm)  Plaque Mobile    Ascending normal  No 0-3 No    Arch normal  No  No    Descending normal  No >3 No          Valves  Annulus  Stenosis  Area/Grad  Regurg  Leaflet   Morph  Leaflet   Motion    Aortic normal none  0 normal normal    Mitral    2+ normal normal    Tricuspid normal none  3+ normal normal          Atria  Size  SEC (smoke)  Thrombus  Tumor  Device    Rt Atrium dilated No No No No    Lt Atrium normal No No No No     Interatrial Septum Morphology: normal    Interventricular Septum Morphology: normal    Ventricle  Cavity Size  Cavity Dimension Hypertrophy  Thrombus  Gloal FXN  EF    RV normal  No no normal     LV normal  Yes No normal 60       Regional Function  (1 = normal, 2 = mildly hypokinetic, 3 = severely hypokinetic, 4 = akinetic, 5 = dyskinetic) LAV - Long [place] View   ME LAV = 0  ME LAV = 90  ME LAV = 130   Basal Sept:2 Basal Ant:4 Basal Post:3   Mid Sept:3 Mid Ant:4 Mid Post:2   Apical Sept:3 Apical Ant:5 Basal Ant Sept:2   Basal Lat:4 Basal Inf:2 Mid Ant Sept:3   Mid Lat:4 Mid Inf:3    Apical Lat:4 Apical Inf:3      Diastolic function: Stage 2 dysfunction  Pericardium: normal    Post Intervention Follow-up Study  Ventricular Global Function: improved  Ventricular Regional Function: improved     Valve  Function  Regurgitation  Area    Aortic no change      Mitral no change      Tricuspid no change      Prosthetic        Complications: None
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
## 2019-05-21 NOTE — CARDIO/PULMONARY
Cardiac Rehab: Living with Heart Failure Booklet given to Kalina Quinn at yesterday's visit. Revisited to reinforce HF education. Family members are at the bedside. Kalina Quinn had a RHC today and PA cath inserted due to low cardiac output and hypotension. Advanced HF is here to evaluate and assist in management. Education is deferred for now.  Kacey Cates RN

## 2019-05-21 NOTE — PROGRESS NOTES
14:15 - 16:15 (120)   16:30 - 17:45 (75)    NYHA class IV acute systolic heart failure  Cardiogenic shock    14:15 - called to see patient for hypotension     14:30 - SBP in the 70's despite dobutamine     14:45 - planning for emergent Impella placement today    15:00 - has some confusion; spoke with family members and patient about Impella support device     15:15 - in OR for lines; here for high risk intubation     15:30 - working on getting blood    15:45 - going over plan with team    16:00 - on Dobutamine, vaospressin, and dopamine    16:30 - weaning drips; tracking down family    16:45 - unable to find family;     17:00 - family here - giving update    17:15 - flows look good    17:30 - CXR looks good; MAPs and cardiac index look great; will try to extubate     Risk of morbidity and mortality - high  Medical decision making - high complexity    Total critical care time - 195 minutes (CPT 05287, 99292 x 5)

## 2019-05-21 NOTE — PROGRESS NOTES
Progress Note    Patient: Bronson Villanueva MRN: [id number]  SSN: [id number]    YOB: 1960  Age: 66 y.o. Sex: female      Admit Date: 5/18/2019    LOS: 3 days     Subjective:     65 yo WF with non-STEMI, mild CAD, stress cardiomyopathy in the pattern of Takotsubo. Hypotension requiring inotropic support. Feels well today. No chest pain or shortness of breath. Objective:     Vitals:    05/21/19 1000 05/21/19 1015 05/21/19 1100 05/21/19 1134   BP: (!) 79/50 (!) 83/53 90/56 94/65   Pulse: 84 84 83 72   Resp: 16 11 20    Temp:       SpO2: 94% (!) 89% 93% 90%   Weight:       Height:            Intake and Output:  Current Shift: 05/21 0701 - 05/21 1900  In: 609 [P.O.:480; I.V.:264]  Out: 500 [Urine:500]  Last three shifts: 05/19 1901 - 05/21 0700  In: 4337.2 [P.O.:1920; I.V.:2417.2]  Out: 4750 [Urine:4750]    Physical Exam:   General:  Alert, cooperative, no distress, appears stated age. Eyes:  Conjunctivae/corneas clear. PERRL, EOMs intact. Fundi benign   Ears:  Normal TMs and external ear canals both ears. Nose: Nares normal. Septum midline. Mucosa normal. No drainage or sinus tenderness. Mouth/Throat: Lips, mucosa, and tongue normal. Teeth and gums normal.   Neck: Supple, symmetrical, trachea midline, no adenopathy, thyroid: no enlargment/tenderness/nodules, no carotid bruit and no JVD. Back:   Symmetric, no curvature. ROM normal. No CVA tenderness. Lungs:   Diminished to auscultation bilaterally. No wheezing. Heart:  Regular rate and rhythm, S1, S2 normal, no murmur, click, rub or gallop. Abdomen:   Soft, non-tender. Bowel sounds normal. No masses,  No organomegaly. Extremities: Extremities normal, atraumatic, no cyanosis or edema. Pulses: 1+ and symmetric all extremities. Skin: Skin color, texture, turgor normal. No rashes or lesions   Lymph nodes: Cervical, supraclavicular, and axillary nodes normal.   Neurologic: CNII-XII intact.  Normal strength, sensation and reflexes throughout. Lab/Data Review:  BMP:   Lab Results   Component Value Date/Time     05/21/2019 03:47 AM    K 4.6 05/21/2019 03:47 AM     05/21/2019 03:47 AM    CO2 27 05/21/2019 03:47 AM    AGAP 6 05/21/2019 03:47 AM     (H) 05/21/2019 03:47 AM    BUN 16 05/21/2019 03:47 AM    CREA 1.13 (H) 05/21/2019 03:47 AM    GFRAA >60 05/21/2019 03:47 AM    GFRNA 50 (L) 05/21/2019 03:47 AM     CMP:   Lab Results   Component Value Date/Time     05/21/2019 03:47 AM    K 4.6 05/21/2019 03:47 AM     05/21/2019 03:47 AM    CO2 27 05/21/2019 03:47 AM    AGAP 6 05/21/2019 03:47 AM     (H) 05/21/2019 03:47 AM    BUN 16 05/21/2019 03:47 AM    CREA 1.13 (H) 05/21/2019 03:47 AM    GFRAA >60 05/21/2019 03:47 AM    GFRNA 50 (L) 05/21/2019 03:47 AM    CA 8.7 05/21/2019 03:47 AM         Assessment:     Active Problems:    NSTEMI (non-ST elevated myocardial infarction) ([facility] (5/18/2019)      Chest pain (5/18/2019)      Overview: Added automatically from request for surgery [id number]      CHF (congestive heart failure), NYHA class IV, acute on chronic, systolic ([facility] (8/15/5533)      Overview: Added automatically from request for surgery [id number]        Plan:     67 yo WF with stress cardiomyopathy. Marginal hemodynamics requiring support. Continue current support until LV function improves. Unable to start afterload reduction due to hypotension. PA catheter placed showing low cardiac output. Will ask CHF team for assistance in managing this patient with low CO state and persistent hypotension.      Signed By: Hiren Holman MD     May 21, 2019

## 2019-05-21 NOTE — PROGRESS NOTES
0730: Bedside and Verbal shift change report given to CARLA POWELL (oncoming nurse) by Amelia Feliz RN (offgoing nurse). Report included the following information SBAR, Kardex, Intake/Output, MAR, Recent Results, Med Rec Status and Cardiac Rhythm NSR.   0900: Dopamine gtt weaned to 5 mcg/kg/min. [id number] Coe MD at bedside. Orders received to wean dopamine gtt 1 mcg/min as long as patient tolerates and is asymptomatic. BP currently 84/56 (MAP 66). 1000: Spoke with Debi Cedillo MD regarding patient feeling constipated. Orders received to give PO Colace every day. Orders also received to switch IV Levaquin to PO.   1030: Spoke with Rocky Martin RN from [address] [place] [address] regarding patient having right heart cath now with PA cath placement d/t inability to wean dopamine gtt effectively. Indu Jaramillo made Pio Ramos MD aware that patient has eaten breakfast. Orders received to hold SQ heparin for now. 1040: TRANSFER - OUT REPORT:    Verbal report given to Rocky Martin RN (name) on [facility]  being transferred to [facility] (unit) for routine progression of care       Report consisted of patients Situation, Background, Assessment and   Recommendations(SBAR). Information from the following report(s) SBAR, Kardex, Intake/Output, MAR, Recent Results, Med Rec Status and Cardiac Rhythm nsr was reviewed with the receiving nurse. Lines:   Peripheral IV 05/19/19 Anterior; Left Forearm (Active)   Site Assessment Clean, dry, & intact 5/21/2019  8:00 AM   Phlebitis Assessment 0 5/21/2019  8:00 AM   Infiltration Assessment 0 5/21/2019  8:00 AM   Dressing Status Clean, dry, & intact 5/21/2019  8:00 AM   Dressing Type Transparent;Tape 5/21/2019  8:00 AM   Hub Color/Line Status Pink; Infusing;Flushed;Patent 5/21/2019  8:00 AM   Action Taken Open ports on tubing capped 5/21/2019  8:00 AM   Alcohol Cap Used Yes 5/21/2019  8:00 AM        Opportunity for questions and clarification was provided.       Patient transported with:   Monitor  Registered Nurse     Pt transported down to CCL with 3 RNs present on monitor. Consents signed for right heart catheterization and PA cath placement. 1200: TRANSFER - IN REPORT:    Verbal report received from Jacki Santoey, [address] (name) on [facility]  being received from [facility] (unit) for routine progression of care      Report consisted of patients Situation, Background, Assessment and   Recommendations(SBAR). Information from the following report(s) SBAR, Kardex, Intake/Output, MAR, Recent Results, Med Rec Status and Cardiac Rhythm NSR was reviewed with the receiving nurse. Opportunity for questions and clarification was provided. Assessment completed upon patients arrival to unit and care assumed. 1220: Mixed venous, 51%. SVO2 monitor recalibrated. 1430: Kavon Velasquez MD at bedside. MD speaking to Ailene Severance, MD regarding impella insertion today. Orders received to start dobutamine gtt and vasopressin gtt for low BP.   1450: Blood consent and temporary left ventricular assist device consent in chart. 1455: CHG bath given and linen changed. 1510: TRANSFER - OUT REPORT:    Verbal report given to Wilner Varghese (name) on [facility]  being transferred to OR (unit) for ordered procedure       Report consisted of patients Situation, Background, Assessment and   Recommendations(SBAR). Information from the following report(s) SBAR, Kardex, Intake/Output, MAR, Recent Results, Med Rec Status and Cardiac Rhythm NSR was reviewed with the receiving nurse. Lines:   Double Lumen 05/21/19 (Active)   Central Line Being Utilized Yes 5/21/2019 12:00 PM   Criteria for Appropriate Use Hemodynamically unstable, requiring monitoring lines, vasopressors, or volume resuscitation 5/21/2019 12:00 PM   Infiltration Assessment 0 5/21/2019 12:00 PM   Affected Extremity/Extremities Color distal to insertion site pink (or appropriate for race); Pulses palpable 5/21/2019 12:00 PM   Date of Last Dressing Change 05/21/19 5/21/2019 12:00 PM   Dressing Status Clean, dry, & intact 5/21/2019 12:00 PM   Dressing Type Disk with Chlorhexadine gluconate (CHG); Transparent 5/21/2019 12:00 PM   Action Taken Open ports on tubing capped 5/21/2019 12:00 PM   Proximal Hub Color/Line Status White;Capped;Flushed;Patent 5/21/2019 12:00 PM   Positive Blood Return (Medial Site) Yes 5/21/2019 12:00 PM   Distal Hub Color/Line Status Brown;Capped;Flushed;Patent 5/21/2019 12:00 PM   Positive Blood Return (Lateral Site) Yes 5/21/2019 12:00 PM   Alcohol Cap Used Yes 5/21/2019 12:00 PM       [place] Triple 05/21/19 (Active)   Central Line Being Utilized Yes 5/21/2019 12:00 PM   Criteria for Appropriate Use Hemodynamically unstable, requiring monitoring lines, vasopressors, or volume resuscitation 5/21/2019 12:00 PM   [place] Wave Form Appropriate wave forms; Rezeroed 5/21/2019 12:00 PM   Dressing Status Clean, dry, & intact 5/21/2019 12:00 PM   Dressing Type Disk with Chlorhexadine gluconate (CHG); Transparent 5/21/2019 12:00 PM   Date of Last Dressing Change 05/21/19 5/21/2019 12:00 PM   Proximal Line Status Intact and in place 5/21/2019 12:00 PM   Medial Line Status Intact and in place 5/21/2019 12:00 PM   Distal Line Status Intact and in place 5/21/2019 12:00 PM   Treatment Zeroed or re-zeroed 5/21/2019 12:00 PM       Peripheral IV 05/19/19 Anterior; Left Forearm (Active)   Site Assessment Clean, dry, & intact 5/21/2019  8:00 AM   Phlebitis Assessment 0 5/21/2019  8:00 AM   Infiltration Assessment 0 5/21/2019  8:00 AM   Dressing Status Clean, dry, & intact 5/21/2019  8:00 AM   Dressing Type Transparent;Tape 5/21/2019  8:00 AM   Hub Color/Line Status Pink; Infusing;Flushed;Patent 5/21/2019  8:00 AM   Action Taken Open ports on tubing capped 5/21/2019  8:00 AM   Alcohol Cap Used Yes 5/21/2019  8:00 AM       Arterial Line 05/21/19 (Active)        Opportunity for questions and clarification was provided.       Patient transported with:   Monitor  O2 @ 4 liters  Registered Nurse  1710: TRANSFER - IN REPORT:    [facility] Courtney Batista  being received from [facility] (unit) for routine post - op      Report consisted of patients Situation, Background, Assessment and   Recommendations(SBAR). Information from the following report(s) SBAR, Kardex, Intake/Output, MAR, Recent Results, Med Rec Status and Cardiac Rhythm NSR was reviewed with the receiving nurse. Opportunity for questions and clarification was provided. Assessment completed upon patients arrival to unit and care assumed. 1820: Pt's vent rate halved. Pt awake and following commands at this time. 1855: Pt placed on CPAP mode. 1925: Pt extubated to 4L nasal cannula. Pt awake, alert, following commands and stating name and date of birth without stridor or coarseness noted. 1930: Bedside and Verbal shift change report given to [facility] (oncoming nurse) by ANDRE RN (offgoing nurse). Report included the following information SBAR, Kardex, Intake/Output, MAR, Recent Results, Med Rec Status and Cardiac Rhythm NSR.

## 2019-05-21 NOTE — PERIOP NOTES
Juan WITH FAMILY AT HER BEDSIDE IN CVICU. Patient verified heridentity by name and date of birth consistent with her arm band. Patient verified surgical procedure consistent with surgical consent. Time given to patient to address any question or concerns .  Comfort provided

## 2019-05-21 NOTE — PROGRESS NOTES
Day #1 of Ancef  Indication:  surgical ppx s/p Impella insertion  Current regimen:  2G IV q6h  Abx regimen: Ancef  Recent Labs     19  0347 19  0318 19  0811   WBC 7.9 12.6* 9.1   CREA 1.13* 1.35* 1.27*   BUN 16 19 18     Est CrCl: 48 ml/min  Temp (24hrs), Av.8 °F (37.1 °C), Min:98.4 °F (36.9 °C), Max:99.2 °F (37.3 °C)        Plan: Change to 2G IV q8h for CrCl 35 -54 ml/min     .

## 2019-05-21 NOTE — ANESTHESIA PREPROCEDURE EVALUATION
Relevant Problems   No relevant active problems       Anesthetic History   No history of anesthetic complications            Review of Systems / Medical History  Patient summary reviewed, nursing notes reviewed and pertinent labs reviewed    Pulmonary  Within defined limits          Asthma        Neuro/Psych   Within defined limits    CVA       Cardiovascular  Within defined limits    Valvular problems/murmurs    CHF    CAD    Exercise tolerance: <4 METS     GI/Hepatic/Renal  Within defined limits   GERD           Endo/Other  Within defined limits      Arthritis and cancer     Other Findings              Physical Exam    Airway  Mallampati: II  TM Distance: > 6 cm  Neck ROM: normal range of motion   Mouth opening: Normal     Cardiovascular  Regular rate and rhythm,  S1 and S2 normal,  no murmur, click, rub, or gallop             Dental  No notable dental hx       Pulmonary  Breath sounds clear to auscultation               Abdominal  GI exam deferred       Other Findings            Anesthetic Plan    ASA: 4  Anesthesia type: general    Monitoring Plan: CVP, Spade-Krishan, ANTONIETA and Arterial line      Induction: Intravenous  Anesthetic plan and risks discussed with: Patient

## 2019-05-22 ENCOUNTER — APPOINTMENT (OUTPATIENT)
Dept: GENERAL RADIOLOGY | Age: 59
DRG: 215 | End: 2019-05-22
Attending: NURSE PRACTITIONER
Payer: MEDICARE

## 2019-05-22 ENCOUNTER — APPOINTMENT (OUTPATIENT)
Dept: NON INVASIVE DIAGNOSTICS | Age: 59
DRG: 215 | End: 2019-05-22
Attending: THORACIC SURGERY (CARDIOTHORACIC VASCULAR SURGERY)
Payer: MEDICARE

## 2019-05-22 ENCOUNTER — APPOINTMENT (OUTPATIENT)
Dept: NON INVASIVE DIAGNOSTICS | Age: 59
DRG: 215 | End: 2019-05-22
Attending: NURSE PRACTITIONER
Payer: MEDICARE

## 2019-05-22 LAB
ABO + RH BLD: NORMAL
ADMINISTERED INITIALS, ADMINIT: NORMAL
ANION GAP SERPL CALC-SCNC: 6 MMOL/L (ref 5–15)
APTT PPP: 28.4 SEC (ref 22.1–32)
B PERT DNA SPEC QL NAA+PROBE: NOT DETECTED
BASOPHILS # BLD: 0 K/UL (ref 0–0.1)
BASOPHILS NFR BLD: 0 % (ref 0–1)
BLD PROD TYP BPU: NORMAL
BLD PROD TYP BPU: NORMAL
BLOOD GROUP ANTIBODIES SERPL: NORMAL
BNP SERPL-MCNC: 7089 PG/ML
BPU ID: NORMAL
BPU ID: NORMAL
BUN SERPL-MCNC: 12 MG/DL (ref 6–20)
BUN/CREAT SERPL: 12 (ref 12–20)
C PNEUM DNA SPEC QL NAA+PROBE: NOT DETECTED
CALCIUM SERPL-MCNC: 8.6 MG/DL (ref 8.5–10.1)
CHLORIDE SERPL-SCNC: 106 MMOL/L (ref 97–108)
CO2 SERPL-SCNC: 27 MMOL/L (ref 21–32)
CREAT SERPL-MCNC: 1.02 MG/DL (ref 0.55–1.02)
CROSSMATCH RESULT,%XM: NORMAL
CROSSMATCH RESULT,%XM: NORMAL
D50 ADMINISTERED, D50ADM: 0 ML
D50 ADMINISTERED, D50ADM: 10 ML
D50 ADMINISTERED, D50ADM: 15 ML
D50 ORDER, D50ORD: 0 ML
D50 ORDER, D50ORD: 10 ML
D50 ORDER, D50ORD: 10 ML
D50 ORDER, D50ORD: 15 ML
DIFFERENTIAL METHOD BLD: ABNORMAL
EOSINOPHIL # BLD: 0 K/UL (ref 0–0.4)
EOSINOPHIL NFR BLD: 0 % (ref 0–7)
ERYTHROCYTE [DISTWIDTH] IN BLOOD BY AUTOMATED COUNT: 13.1 % (ref 11.5–14.5)
FLUAV H1 2009 PAND RNA SPEC QL NAA+PROBE: NOT DETECTED
FLUAV H1 RNA SPEC QL NAA+PROBE: NOT DETECTED
FLUAV H3 RNA SPEC QL NAA+PROBE: NOT DETECTED
FLUAV SUBTYP SPEC NAA+PROBE: NOT DETECTED
FLUBV RNA SPEC QL NAA+PROBE: NOT DETECTED
FSH SERPL-ACNC: 30 MIU/ML
GLSCOM COMMENTS: NORMAL
GLUCOSE BLD STRIP.AUTO-MCNC: 101 MG/DL (ref 65–100)
GLUCOSE BLD STRIP.AUTO-MCNC: 110 MG/DL (ref 65–100)
GLUCOSE BLD STRIP.AUTO-MCNC: 115 MG/DL (ref 65–100)
GLUCOSE BLD STRIP.AUTO-MCNC: 117 MG/DL (ref 65–100)
GLUCOSE BLD STRIP.AUTO-MCNC: 121 MG/DL (ref 65–100)
GLUCOSE BLD STRIP.AUTO-MCNC: 125 MG/DL (ref 65–100)
GLUCOSE BLD STRIP.AUTO-MCNC: 138 MG/DL (ref 65–100)
GLUCOSE BLD STRIP.AUTO-MCNC: 141 MG/DL (ref 65–100)
GLUCOSE BLD STRIP.AUTO-MCNC: 147 MG/DL (ref 65–100)
GLUCOSE BLD STRIP.AUTO-MCNC: 153 MG/DL (ref 65–100)
GLUCOSE BLD STRIP.AUTO-MCNC: 161 MG/DL (ref 65–100)
GLUCOSE BLD STRIP.AUTO-MCNC: 165 MG/DL (ref 65–100)
GLUCOSE BLD STRIP.AUTO-MCNC: 165 MG/DL (ref 65–100)
GLUCOSE BLD STRIP.AUTO-MCNC: 229 MG/DL (ref 65–100)
GLUCOSE BLD STRIP.AUTO-MCNC: 232 MG/DL (ref 65–100)
GLUCOSE BLD STRIP.AUTO-MCNC: 273 MG/DL (ref 65–100)
GLUCOSE BLD STRIP.AUTO-MCNC: 51 MG/DL (ref 65–100)
GLUCOSE BLD STRIP.AUTO-MCNC: 62 MG/DL (ref 65–100)
GLUCOSE BLD STRIP.AUTO-MCNC: 75 MG/DL (ref 65–100)
GLUCOSE BLD STRIP.AUTO-MCNC: 76 MG/DL (ref 65–100)
GLUCOSE BLD STRIP.AUTO-MCNC: 87 MG/DL (ref 65–100)
GLUCOSE BLD STRIP.AUTO-MCNC: 88 MG/DL (ref 65–100)
GLUCOSE BLD STRIP.AUTO-MCNC: 95 MG/DL (ref 65–100)
GLUCOSE BLD STRIP.AUTO-MCNC: 97 MG/DL (ref 65–100)
GLUCOSE SERPL-MCNC: 57 MG/DL (ref 65–100)
GLUCOSE, GLC: 101 MG/DL
GLUCOSE, GLC: 110 MG/DL
GLUCOSE, GLC: 115 MG/DL
GLUCOSE, GLC: 117 MG/DL
GLUCOSE, GLC: 121 MG/DL
GLUCOSE, GLC: 125 MG/DL
GLUCOSE, GLC: 138 MG/DL
GLUCOSE, GLC: 141 MG/DL
GLUCOSE, GLC: 147 MG/DL
GLUCOSE, GLC: 153 MG/DL
GLUCOSE, GLC: 161 MG/DL
GLUCOSE, GLC: 165 MG/DL
GLUCOSE, GLC: 165 MG/DL
GLUCOSE, GLC: 229 MG/DL
GLUCOSE, GLC: 232 MG/DL
GLUCOSE, GLC: 273 MG/DL
GLUCOSE, GLC: 62 MG/DL
GLUCOSE, GLC: 75 MG/DL
GLUCOSE, GLC: 76 MG/DL
GLUCOSE, GLC: 87 MG/DL
GLUCOSE, GLC: 88 MG/DL
GLUCOSE, GLC: 95 MG/DL
GLUCOSE, GLC: 97 MG/DL
HADV DNA SPEC QL NAA+PROBE: NOT DETECTED
HCOV 229E RNA SPEC QL NAA+PROBE: NOT DETECTED
HCOV HKU1 RNA SPEC QL NAA+PROBE: NOT DETECTED
HCOV NL63 RNA SPEC QL NAA+PROBE: NOT DETECTED
HCOV OC43 RNA SPEC QL NAA+PROBE: NOT DETECTED
HCT VFR BLD AUTO: 34.2 % (ref 35–47)
HGB BLD-MCNC: 11.2 G/DL (ref 11.5–16)
HIGH TARGET, HITG: 130 MG/DL
HIGH TARGET, HITG: 140 MG/DL
HIV 1+2 AB+HIV1 P24 AG SERPL QL IA: NONREACTIVE
HIV12 RESULT COMMENT, HHIVC: NORMAL
HMPV RNA SPEC QL NAA+PROBE: NOT DETECTED
HPIV1 RNA SPEC QL NAA+PROBE: NOT DETECTED
HPIV2 RNA SPEC QL NAA+PROBE: NOT DETECTED
HPIV3 RNA SPEC QL NAA+PROBE: NOT DETECTED
HPIV4 RNA SPEC QL NAA+PROBE: NOT DETECTED
IMM GRANULOCYTES # BLD AUTO: 0 K/UL (ref 0–0.04)
IMM GRANULOCYTES NFR BLD AUTO: 0 % (ref 0–0.5)
INR PPP: 1.1 (ref 0.9–1.1)
INSULIN ADMINSTERED, INSADM: 0 UNITS/HOUR
INSULIN ADMINSTERED, INSADM: 1.3 UNITS/HOUR
INSULIN ADMINSTERED, INSADM: 1.4 UNITS/HOUR
INSULIN ADMINSTERED, INSADM: 2.1 UNITS/HOUR
INSULIN ADMINSTERED, INSADM: 2.3 UNITS/HOUR
INSULIN ADMINSTERED, INSADM: 2.5 UNITS/HOUR
INSULIN ADMINSTERED, INSADM: 2.5 UNITS/HOUR
INSULIN ADMINSTERED, INSADM: 2.8 UNITS/HOUR
INSULIN ADMINSTERED, INSADM: 2.9 UNITS/HOUR
INSULIN ADMINSTERED, INSADM: 3.4 UNITS/HOUR
INSULIN ADMINSTERED, INSADM: 3.7 UNITS/HOUR
INSULIN ADMINSTERED, INSADM: 3.9 UNITS/HOUR
INSULIN ADMINSTERED, INSADM: 4.2 UNITS/HOUR
INSULIN ADMINSTERED, INSADM: 4.3 UNITS/HOUR
INSULIN ADMINSTERED, INSADM: 4.7 UNITS/HOUR
INSULIN ADMINSTERED, INSADM: 5.8 UNITS/HOUR
INSULIN ADMINSTERED, INSADM: 6.8 UNITS/HOUR
INSULIN ADMINSTERED, INSADM: 7.5 UNITS/HOUR
INSULIN ADMINSTERED, INSADM: 7.6 UNITS/HOUR
INSULIN ADMINSTERED, INSADM: 7.6 UNITS/HOUR
INSULIN ORDER, INSORD: 0 UNITS/HOUR
INSULIN ORDER, INSORD: 0.4 UNITS/HOUR
INSULIN ORDER, INSORD: 1.3 UNITS/HOUR
INSULIN ORDER, INSORD: 1.4 UNITS/HOUR
INSULIN ORDER, INSORD: 2.1 UNITS/HOUR
INSULIN ORDER, INSORD: 2.3 UNITS/HOUR
INSULIN ORDER, INSORD: 2.5 UNITS/HOUR
INSULIN ORDER, INSORD: 2.5 UNITS/HOUR
INSULIN ORDER, INSORD: 2.8 UNITS/HOUR
INSULIN ORDER, INSORD: 2.9 UNITS/HOUR
INSULIN ORDER, INSORD: 3.4 UNITS/HOUR
INSULIN ORDER, INSORD: 3.7 UNITS/HOUR
INSULIN ORDER, INSORD: 3.9 UNITS/HOUR
INSULIN ORDER, INSORD: 4.2 UNITS/HOUR
INSULIN ORDER, INSORD: 4.3 UNITS/HOUR
INSULIN ORDER, INSORD: 4.7 UNITS/HOUR
INSULIN ORDER, INSORD: 5.8 UNITS/HOUR
INSULIN ORDER, INSORD: 6.8 UNITS/HOUR
INSULIN ORDER, INSORD: 7.5 UNITS/HOUR
INSULIN ORDER, INSORD: 7.6 UNITS/HOUR
INSULIN ORDER, INSORD: 7.6 UNITS/HOUR
LACTATE SERPL-SCNC: 1.1 MMOL/L (ref 0.4–2)
LACTATE SERPL-SCNC: 1.2 MMOL/L (ref 0.4–2)
LACTATE SERPL-SCNC: 1.4 MMOL/L (ref 0.4–2)
LACTATE SERPL-SCNC: 2.3 MMOL/L (ref 0.4–2)
LDH SERPL L TO P-CCNC: 583 U/L (ref 81–246)
LH SERPL-ACNC: 4.2 MIU/ML
LOW TARGET, LOT: 100 MG/DL
LOW TARGET, LOT: 95 MG/DL
LYMPHOCYTES # BLD: 0.6 K/UL (ref 0.8–3.5)
LYMPHOCYTES NFR BLD: 6 % (ref 12–49)
M PNEUMO DNA SPEC QL NAA+PROBE: NOT DETECTED
MAGNESIUM SERPL-MCNC: 1.4 MG/DL (ref 1.6–2.4)
MCH RBC QN AUTO: 28.7 PG (ref 26–34)
MCHC RBC AUTO-ENTMCNC: 32.7 G/DL (ref 30–36.5)
MCV RBC AUTO: 87.7 FL (ref 80–99)
MINUTES UNTIL NEXT BG, NBG: 15 MIN
MINUTES UNTIL NEXT BG, NBG: 60 MIN
MONOCYTES # BLD: 0.4 K/UL (ref 0–1)
MONOCYTES NFR BLD: 4 % (ref 5–13)
MULTIPLIER, MUL: 0.01
MULTIPLIER, MUL: 0.03
MULTIPLIER, MUL: 0.03
MULTIPLIER, MUL: 0.04
MULTIPLIER, MUL: 0.05
MULTIPLIER, MUL: 0.06
MULTIPLIER, MUL: 0.07
MULTIPLIER, MUL: 0.07
NEUTS SEG # BLD: 8.6 K/UL (ref 1.8–8)
NEUTS SEG NFR BLD: 90 % (ref 32–75)
NRBC # BLD: 0 K/UL (ref 0–0.01)
NRBC BLD-RTO: 0 PER 100 WBC
ORDER INITIALS, ORDINIT: NORMAL
PLATELET # BLD AUTO: 293 K/UL (ref 150–400)
PMV BLD AUTO: 9.3 FL (ref 8.9–12.9)
POTASSIUM SERPL-SCNC: 3.5 MMOL/L (ref 3.5–5.1)
PROCALCITONIN SERPL-MCNC: 0.1 NG/ML
PROTHROMBIN TIME: 10.7 SEC (ref 9–11.1)
RBC # BLD AUTO: 3.9 M/UL (ref 3.8–5.2)
RBC MORPH BLD: ABNORMAL
RSV RNA SPEC QL NAA+PROBE: NOT DETECTED
RV+EV RNA SPEC QL NAA+PROBE: DETECTED
SERVICE CMNT-IMP: ABNORMAL
SERVICE CMNT-IMP: NORMAL
SODIUM SERPL-SCNC: 139 MMOL/L (ref 136–145)
SPECIMEN EXP DATE BLD: NORMAL
STATUS OF UNIT,%ST: NORMAL
STATUS OF UNIT,%ST: NORMAL
T4 FREE SERPL-MCNC: 1.2 NG/DL (ref 0.8–1.5)
T4 FREE SERPL-MCNC: 1.2 NG/DL (ref 0.8–1.5)
THERAPEUTIC RANGE,PTTT: NORMAL SECS (ref 58–77)
TROPONIN I SERPL-MCNC: 0.49 NG/ML
UNIT DIVISION, %UDIV: 0
UNIT DIVISION, %UDIV: 0
WBC # BLD AUTO: 9.6 K/UL (ref 3.6–11)

## 2019-05-22 PROCEDURE — 74011000250 HC RX REV CODE- 250: Performed by: NURSE PRACTITIONER

## 2019-05-22 PROCEDURE — 87389 HIV-1 AG W/HIV-1&-2 AB AG IA: CPT

## 2019-05-22 PROCEDURE — 84484 ASSAY OF TROPONIN QUANT: CPT

## 2019-05-22 PROCEDURE — 83735 ASSAY OF MAGNESIUM: CPT

## 2019-05-22 PROCEDURE — 86658 ENTEROVIRUS ANTIBODY: CPT

## 2019-05-22 PROCEDURE — 85025 COMPLETE CBC W/AUTO DIFF WBC: CPT

## 2019-05-22 PROCEDURE — 71045 X-RAY EXAM CHEST 1 VIEW: CPT

## 2019-05-22 PROCEDURE — 74011000258 HC RX REV CODE- 258: Performed by: NURSE PRACTITIONER

## 2019-05-22 PROCEDURE — 84145 PROCALCITONIN (PCT): CPT

## 2019-05-22 PROCEDURE — 85610 PROTHROMBIN TIME: CPT

## 2019-05-22 PROCEDURE — 65610000003 HC RM ICU SURGICAL

## 2019-05-22 PROCEDURE — 83615 LACTATE (LD) (LDH) ENZYME: CPT

## 2019-05-22 PROCEDURE — 94640 AIRWAY INHALATION TREATMENT: CPT

## 2019-05-22 PROCEDURE — 74011000258 HC RX REV CODE- 258: Performed by: PHYSICIAN ASSISTANT

## 2019-05-22 PROCEDURE — 84439 ASSAY OF FREE THYROXINE: CPT

## 2019-05-22 PROCEDURE — 93308 TTE F-UP OR LMTD: CPT

## 2019-05-22 PROCEDURE — 80048 BASIC METABOLIC PNL TOTAL CA: CPT

## 2019-05-22 PROCEDURE — 74011250637 HC RX REV CODE- 250/637: Performed by: HOSPITALIST

## 2019-05-22 PROCEDURE — 83880 ASSAY OF NATRIURETIC PEPTIDE: CPT

## 2019-05-22 PROCEDURE — 74011250636 HC RX REV CODE- 250/636: Performed by: HOSPITALIST

## 2019-05-22 PROCEDURE — 36415 COLL VENOUS BLD VENIPUNCTURE: CPT

## 2019-05-22 PROCEDURE — 74011250636 HC RX REV CODE- 250/636: Performed by: NURSE PRACTITIONER

## 2019-05-22 PROCEDURE — 74011000250 HC RX REV CODE- 250: Performed by: HOSPITALIST

## 2019-05-22 PROCEDURE — 83605 ASSAY OF LACTIC ACID: CPT

## 2019-05-22 PROCEDURE — 74011250636 HC RX REV CODE- 250/636: Performed by: PHYSICIAN ASSISTANT

## 2019-05-22 PROCEDURE — 82962 GLUCOSE BLOOD TEST: CPT

## 2019-05-22 PROCEDURE — 74011636637 HC RX REV CODE- 636/637: Performed by: THORACIC SURGERY (CARDIOTHORACIC VASCULAR SURGERY)

## 2019-05-22 PROCEDURE — 87633 RESP VIRUS 12-25 TARGETS: CPT

## 2019-05-22 PROCEDURE — 85730 THROMBOPLASTIN TIME PARTIAL: CPT

## 2019-05-22 PROCEDURE — 74011000250 HC RX REV CODE- 250: Performed by: INTERNAL MEDICINE

## 2019-05-22 PROCEDURE — 74011250637 HC RX REV CODE- 250/637: Performed by: NURSE PRACTITIONER

## 2019-05-22 PROCEDURE — 74011000258 HC RX REV CODE- 258: Performed by: THORACIC SURGERY (CARDIOTHORACIC VASCULAR SURGERY)

## 2019-05-22 PROCEDURE — 74011250636 HC RX REV CODE- 250/636: Performed by: THORACIC SURGERY (CARDIOTHORACIC VASCULAR SURGERY)

## 2019-05-22 PROCEDURE — 74011000250 HC RX REV CODE- 250: Performed by: THORACIC SURGERY (CARDIOTHORACIC VASCULAR SURGERY)

## 2019-05-22 PROCEDURE — 99291 CRITICAL CARE FIRST HOUR: CPT | Performed by: INTERNAL MEDICINE

## 2019-05-22 PROCEDURE — C9113 INJ PANTOPRAZOLE SODIUM, VIA: HCPCS | Performed by: NURSE PRACTITIONER

## 2019-05-22 RX ORDER — ASPIRIN 81 MG/1
81 TABLET ORAL DAILY
Status: DISCONTINUED | OUTPATIENT
Start: 2019-05-22 | End: 2019-06-04 | Stop reason: HOSPADM

## 2019-05-22 RX ORDER — AMIODARONE HYDROCHLORIDE 200 MG/1
400 TABLET ORAL 2 TIMES DAILY
Status: DISCONTINUED | OUTPATIENT
Start: 2019-05-22 | End: 2019-05-22

## 2019-05-22 RX ORDER — POTASSIUM CHLORIDE 29.8 MG/ML
20 INJECTION INTRAVENOUS
Status: COMPLETED | OUTPATIENT
Start: 2019-05-22 | End: 2019-05-22

## 2019-05-22 RX ORDER — POTASSIUM CHLORIDE 750 MG/1
20 TABLET, FILM COATED, EXTENDED RELEASE ORAL 2 TIMES DAILY
Status: DISCONTINUED | OUTPATIENT
Start: 2019-05-22 | End: 2019-05-23

## 2019-05-22 RX ORDER — SODIUM CHLORIDE 450 MG/100ML
50 INJECTION, SOLUTION INTRAVENOUS CONTINUOUS
Status: DISCONTINUED | OUTPATIENT
Start: 2019-05-22 | End: 2019-05-23

## 2019-05-22 RX ORDER — SPIRONOLACTONE 25 MG/1
12.5 TABLET ORAL 2 TIMES DAILY
Status: DISCONTINUED | OUTPATIENT
Start: 2019-05-22 | End: 2019-05-23

## 2019-05-22 RX ORDER — CEFAZOLIN SODIUM/WATER 2 G/20 ML
2 SYRINGE (ML) INTRAVENOUS EVERY 8 HOURS
Status: DISCONTINUED | OUTPATIENT
Start: 2019-05-22 | End: 2019-05-28

## 2019-05-22 RX ORDER — POTASSIUM CHLORIDE 29.8 MG/ML
INJECTION INTRAVENOUS
Status: DISPENSED
Start: 2019-05-22 | End: 2019-05-22

## 2019-05-22 RX ORDER — CLONAZEPAM 0.5 MG/1
0.5 TABLET ORAL
Status: DISCONTINUED | OUTPATIENT
Start: 2019-05-22 | End: 2019-05-30

## 2019-05-22 RX ORDER — PANTOPRAZOLE SODIUM 40 MG/1
40 TABLET, DELAYED RELEASE ORAL
Status: DISCONTINUED | OUTPATIENT
Start: 2019-05-22 | End: 2019-06-04 | Stop reason: HOSPADM

## 2019-05-22 RX ADMIN — BUDESONIDE 250 MCG: 0.25 INHALANT RESPIRATORY (INHALATION) at 21:55

## 2019-05-22 RX ADMIN — POTASSIUM CHLORIDE 20 MEQ: 400 INJECTION, SOLUTION INTRAVENOUS at 07:27

## 2019-05-22 RX ADMIN — Medication 2 G: at 16:39

## 2019-05-22 RX ADMIN — BUDESONIDE 250 MCG: 0.25 INHALANT RESPIRATORY (INHALATION) at 07:57

## 2019-05-22 RX ADMIN — BIVALIRUDIN 13.2 ML/HR: 250 INJECTION, POWDER, LYOPHILIZED, FOR SOLUTION INTRAVENOUS at 09:24

## 2019-05-22 RX ADMIN — OXYCODONE HYDROCHLORIDE AND ACETAMINOPHEN 1 TABLET: 5; 325 TABLET ORAL at 20:52

## 2019-05-22 RX ADMIN — SODIUM CHLORIDE 50 ML/HR: 450 INJECTION, SOLUTION INTRAVENOUS at 08:58

## 2019-05-22 RX ADMIN — SPIRONOLACTONE 12.5 MG: 25 TABLET, FILM COATED ORAL at 18:14

## 2019-05-22 RX ADMIN — OXYCODONE HYDROCHLORIDE AND ACETAMINOPHEN 1 TABLET: 5; 325 TABLET ORAL at 04:11

## 2019-05-22 RX ADMIN — SODIUM CHLORIDE 5 MG/HR: 900 INJECTION, SOLUTION INTRAVENOUS at 11:41

## 2019-05-22 RX ADMIN — SODIUM CHLORIDE 2.5 UNITS/HR: 900 INJECTION, SOLUTION INTRAVENOUS at 17:05

## 2019-05-22 RX ADMIN — POTASSIUM CHLORIDE 20 MEQ: 750 TABLET, EXTENDED RELEASE ORAL at 09:59

## 2019-05-22 RX ADMIN — Medication 10 ML: at 13:40

## 2019-05-22 RX ADMIN — Medication 10 ML: at 00:38

## 2019-05-22 RX ADMIN — IPRATROPIUM BROMIDE AND ALBUTEROL SULFATE 3 ML: .5; 3 SOLUTION RESPIRATORY (INHALATION) at 07:57

## 2019-05-22 RX ADMIN — SPIRONOLACTONE 12.5 MG: 25 TABLET, FILM COATED ORAL at 09:59

## 2019-05-22 RX ADMIN — SODIUM CHLORIDE 5 MG/HR: 900 INJECTION, SOLUTION INTRAVENOUS at 06:27

## 2019-05-22 RX ADMIN — Medication 2 G: at 00:37

## 2019-05-22 RX ADMIN — POTASSIUM CHLORIDE 20 MEQ: 750 TABLET, EXTENDED RELEASE ORAL at 18:14

## 2019-05-22 RX ADMIN — HYDROCORTISONE SODIUM SUCCINATE 100 MG: 100 INJECTION, POWDER, FOR SOLUTION INTRAMUSCULAR; INTRAVENOUS at 04:12

## 2019-05-22 RX ADMIN — AMIODARONE HYDROCHLORIDE 1 MG/MIN: 50 INJECTION, SOLUTION INTRAVENOUS at 05:26

## 2019-05-22 RX ADMIN — SODIUM CHLORIDE 5 MG/HR: 900 INJECTION, SOLUTION INTRAVENOUS at 02:14

## 2019-05-22 RX ADMIN — OXYCODONE HYDROCHLORIDE AND ACETAMINOPHEN 1 TABLET: 5; 325 TABLET ORAL at 13:12

## 2019-05-22 RX ADMIN — ASPIRIN 81 MG: 81 TABLET ORAL at 09:59

## 2019-05-22 RX ADMIN — SODIUM CHLORIDE 5 MG/HR: 900 INJECTION, SOLUTION INTRAVENOUS at 17:35

## 2019-05-22 RX ADMIN — HYDROCORTISONE SODIUM SUCCINATE 100 MG: 100 INJECTION, POWDER, FOR SOLUTION INTRAMUSCULAR; INTRAVENOUS at 20:52

## 2019-05-22 RX ADMIN — OXYCODONE HYDROCHLORIDE AND ACETAMINOPHEN 1 TABLET: 5; 325 TABLET ORAL at 10:08

## 2019-05-22 RX ADMIN — HYDROCORTISONE SODIUM SUCCINATE 100 MG: 100 INJECTION, POWDER, FOR SOLUTION INTRAMUSCULAR; INTRAVENOUS at 13:39

## 2019-05-22 RX ADMIN — SODIUM CHLORIDE 40 MG: 9 INJECTION, SOLUTION INTRAMUSCULAR; INTRAVENOUS; SUBCUTANEOUS at 08:34

## 2019-05-22 RX ADMIN — SODIUM CHLORIDE 9 ML/HR: 900 INJECTION, SOLUTION INTRAVENOUS at 05:13

## 2019-05-22 RX ADMIN — Medication 2 G: at 07:27

## 2019-05-22 RX ADMIN — DEXTROSE MONOHYDRATE 13.8 ML/HR: 5 INJECTION, SOLUTION INTRAVENOUS at 05:13

## 2019-05-22 RX ADMIN — MORPHINE SULFATE 4 MG: 4 INJECTION INTRAVENOUS at 00:37

## 2019-05-22 RX ADMIN — POTASSIUM CHLORIDE 20 MEQ: 400 INJECTION, SOLUTION INTRAVENOUS at 08:34

## 2019-05-22 NOTE — ANESTHESIA POSTPROCEDURE EVALUATION
Procedure(s):  IMPELLA INSERTION RIGHT SUBCLAVIAN . TRANSESOPHAGEAL ECHOCARDIOGRAM BY DR. Susan Aggarwal. Rhiannon Berry general    Anesthesia Post Evaluation        Patient location during evaluation: PACU  Note status: Adequate. Level of consciousness: responsive to verbal stimuli and sleepy but conscious  Pain management: satisfactory to patient  Airway patency: patent  Anesthetic complications: no  Cardiovascular status: acceptable  Respiratory status: acceptable  Hydration status: acceptable  Comments: +Post-Anesthesia Evaluation and Assessment    Patient: Kellee Sams MRN: [id number]  SSN: [id number]   YOB: 1960  Age: 66 y.o. Sex: female          Cardiovascular Function/Vital Signs    /64   Pulse 68   Temp 36.5 °C (97.7 °F)   Resp 18   Ht 5' 4\" (1.626 m)   Wt 57.3 kg (126 lb 5.2 oz)   SpO2 99%   BMI 21.68 kg/m²     Patient is status post Procedure(s) with comments:  IMPELLA INSERTION RIGHT SUBCLAVIAN . TRANSESOPHAGEAL ECHOCARDIOGRAM BY DR. Susan Aggarwal. - RIGHT SUBCLAVIAN. Rhiannon Berry Nausea/Vomiting: Controlled. Postoperative hydration reviewed and adequate. Pain:  Pain Scale 1: Numeric (0 - 10) (05/22/19 0800)  Pain Intensity 1: 0 (05/22/19 0800)   Managed. Neurological Status: At baseline. Mental Status and Level of Consciousness: Arousable. Pulmonary Status:   O2 Device: Nasal cannula (05/22/19 0800)   Adequate oxygenation and airway patent. Complications related to anesthesia: None    Post-anesthesia assessment completed. No concerns. I have evaluated the patient and the patient is stable and ready to be discharged from PACU . Signed By: Rylie Frederick MD    5/22/2019        Vitals Value Taken Time   BP     Temp     Pulse 77 5/22/2019  9:14 AM   Resp 19 5/22/2019  9:14 AM   SpO2 96 % 5/22/2019  9:14 AM   Vitals shown include unvalidated device data.
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
## 2019-05-22 NOTE — PROGRESS NOTES
0800: Assumed care of patient from off going nurse [facility]. Patient is up and in the chair. Drips verified. 0900: Amanda Ortez NP updated on patient status. New orders received for lab work. Dobutamine stopped per NP.  1130: Heart Failure team at bedside, updated with lab results. Spoke with patient about treatment plan. 1200: Bedside and Verbal shift change report given to Reena (oncoming nurse) by Toy Hodgkins RN (offgoing nurse). Report included the following information SBAR.

## 2019-05-22 NOTE — DIABETES MGMT
Consult Note     Recommendations/ Comments:  Consult received for pre/post op glucose management. Patient on the Insulin drip for 23 hours. Drip rate 1.4 units/hr. Has required 17 units insulin over last 6 hours. Noted Endocrinological derangement: On hydrocortisone 100 mg TID. 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 Patient will require basal insulin 2 hours prior to discontinuing the drip. Reassess basal insulin needs closer to transition. Chart reviewed on Cait[facility]. S/p impella insertion on 5/21/19. Patient is 66 y.o. female  No previous hx of diabetes noted, A1c 6.4%. A1c:   Lab Results   Component Value Date/Time    Hemoglobin A1c 6.4 (H) 05/19/2019 08:11 AM         Recent Glucose Results:   Lab Results   Component Value Date/Time    GLU 57 (L) 05/22/2019 03:51 AM     (H) 05/21/2019 06:00 PM    GLUCPOC 97 05/22/2019 03:59 PM    GLUCPOC 75 05/22/2019 03:35 PM    GLUCPOC 117 (H) 05/22/2019 02:24 PM        Lab Results   Component Value Date/Time    Creatinine 1.02 05/22/2019 03:51 AM       Active Orders   Diet    DIET CARDIAC Regular; No Conc. Sweets        PO intake:   Patient Vitals for the past 72 hrs:   % Diet Eaten   05/22/19 0900 100 %   05/21/19 1710 0 %   05/21/19 1200 100 %   05/21/19 0800 100 %   05/20/19 1200 75 %   05/20/19 0900 100 %   05/19/19 1900 100 %       Currently on insulin gtt. Will continue to follow as needed. Thank you.     Yolande France RD, [phone]  Pager: [phone]

## 2019-05-22 NOTE — PROGRESS NOTES
NYHA class IV acute systolic heart failure  Cardiogenic shock    Dyspnea, fatigue, and weakness improving     Hgb and platelets look good    Started on bivalirudin     Creatinine normal    Lactic acid bumped a little     Working on endocrine issues     Discussed with HF team     CXR - clear lung fields     Impella - flow 3.5 L/min @ P-8    Risk of morbidity and mortality - high  Medical decision making - high complexity    Total critical care time - 30 minutes (CPT 37112)

## 2019-05-22 NOTE — PROGRESS NOTES
[facility] ICU Progress Note    Admit Date: 2019  POD:  1 Day Post-Op    Procedure:  Procedure(s):  IMPELLA INSERTION RIGHT SUBCLAVIAN . TRANSESOPHAGEAL ECHOCARDIOGRAM BY DR. Lisa Clifton. Subjective:   Pt seen with Dr. Myrna Stroud. Tmax 99.5, on 4L NC. On cardene 5, amio 1, dobut 2.5, insulin gtt. D5 through impella. Up in chair. Impella P8. Objective:   Vitals:  Blood pressure 118/64, pulse 68, temperature 97.7 °F (36.5 °C), resp. rate 18, height 5' 4\" (1.626 m), weight 126 lb 5.2 oz (57.3 kg), last menstrual period 2011, SpO2 99 %. Temp (24hrs), Av.8 °F (37.1 °C), Min:97.7 °F (36.5 °C), Max:99.5 °F (37.5 °C)    Hemodynamics:   CO: CO (l/min): 4.7 l/min   CI: CI (l/min/m2): 2.9 l/min/m2   CVP: CVP (mmHg): 2 mmHg (19)   SVR: SVR (dyne*sec)/cm5: 8091 (dyne*sec)/cm5 (19 0031)   PAP Systolic: PAP Systolic: 23 (90/16/12 8228)   PAP Diastolic: PAP Diastolic: 9 (72/27/60 7141)   PVR:     SV02: SVO2 (%): 77 % (19)   SCV02:      EKG/Rhythm:  NSR 80s    Extubation Date / Time:     Ventilator:  Ventilator Volumes  Vt Set (ml): 440 ml (19)  Vt Exhaled (Machine Breath) (ml): 349 ml (19)  Ve Observed (l/min): 8.55 l/min (19)    Oxygen Therapy:  Oxygen Therapy  O2 Sat (%): 99 % (19)  Pulse via Oximetry: 67 beats per minute (19)  O2 Device: Nasal cannula (05/22/19 0800)  O2 Flow Rate (L/min): 4 l/min (19 0800)  O2 Temperature: 39.2 °F (4 °C) (19 0600)  FIO2 (%): 40 % (19)    CXR:   CXR Results  (Last 48 hours)               19 0431  XR CHEST PORT Final result    Impression:  1. Small bilateral pleural effusions with bibasilar atelectasis is present. This   has slightly improved in the interval.       Narrative:  EXAM: XR CHEST PORT       INDICATION: PAC placement       COMPARISON: 2019       FINDINGS: A portable AP radiograph of the chest was obtained at 0340 hours.  The   patient is on a cardiac monitor. The ET tube and NG tube have been removed. [place]-Krishan catheter and cardiac assist device are in satisfactory position. Small bilateral pleural effusions and bibasilar atelectasis are present. Overall   aeration has improved at the lung bases. Mild edema-like pattern is unchanged. 05/21/19 1735  XR CHEST PORT Final result    Impression:  Impression:       1. Lines and tubes as above. 2. Increased small bilateral pleural effusions and mild edema       Narrative:  Chest portable AP       History: Impella/ETT placement       Comparison: 5/18/2018       Findings:  Endotracheal tube is in appropriate position approximately 5.5 cm   above the mary. A right IJ [place]-Krishan catheter terminates in the right   pulmonary artery. A right subclavian patella device is present. A nasogastric   tube terminates off the edge of film. There is obscuration of both   hemidiaphragms with haziness likely related to small bilateral pleural effusions   which are new. There is mild increased edema. No pneumothorax. Admission Weight: Last Weight   Weight: 125 lb (56.7 kg) Weight: 126 lb 5.2 oz (57.3 kg)     Intake / Output / Drain:  Current Shift: No intake/output data recorded. Last 24 hrs.:     Intake/Output Summary (Last 24 hours) at 5/22/2019 0905  Last data filed at 5/22/2019 0700  Gross per 24 hour   Intake 3884.8 ml   Output 4125 ml   Net -240.2 ml       EXAM:  General:  NAD, alert                                                                                           Lungs:   Clear to auscultation bilaterally. Incision:  dsg cdi   Heart:  Regular rate and rhythm, S1, S2 normal, no murmur, click, rub or gallop. Abdomen:   Soft, non-tender. Bowel sounds normal. No masses,  No organomegaly. Extremities:  No edema. PPP. Neurologic:  Gross motor and sensory apparatus intact.      Labs:   Recent Labs     05/22/19  0842  05/22/19  0351   WBC  --   --  9.6   HGB  --   --  11.2*   HCT  -- --  34.2*   PLT  --   --  293   NA  --   --  139   K  --   --  3.5   BUN  --   --  12   CREA  --   --  1.02   GLU  --   --  57*   GLUCPOC 165*   < >  --    INR  --   --  1.1    < > = values in this interval not displayed. Assessment:     Active Problems:    NSTEMI (non-ST elevated myocardial infarction) ([facility] (2019)      Chest pain (2019)      Overview: Added automatically from request for surgery [id number]      CHF (congestive heart failure), NYHA class IV, acute on chronic, systolic ([facility] (3/41/7887)      Overview: Added automatically from request for surgery [id number]         Plan/Recommendations/Medical Decision Makin. Acute systolic heart failure (NYHA class IV on admission): Impella placed . Cont impella P8. On ancef for impella ppx. Start bival through purge. , lactate 1.2. NT proBNP 7000 -trend. AHF following. Plan to wean dobut, start aldactone. 2. Atelectasis: Encourage I/S, wean O2 for sats >92%  3. 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 4. Hx arthritis, fibromyalgia, chronic pain, DJD  5. GERD:   6. Hx stroke: pt reports stoke int he past, no deficits  7. COPD: Current smoker, smoking cessation education. Wean O2 for sats >92%, cont nebs, prn duoneb.    8. Dispo: Cont ICU care, cont impella support    Signed By: Edna Hubbard NP

## 2019-05-22 NOTE — NURSE NAVIGATOR
Chart reviewed by Heart Failure Nurse Navigator. Heart Failure database completed. EF:  20%     ACEi/ARB/ARNi: contraindicated at this time due to hypotension    BB: contraindicated at this time due to hypotension    Aldosterone Antagonist: Aldactone 12.5mg twice daily    Obstructive Sleep Apnea Screening:    STOP-BANG score:   Referred to Sleep Medicine:     CRT not currently indicated. NYHA Functional Class IV on admission. Heart Failure Teach Back in Patient Education. Heart Failure Avoiding Triggers on Discharge Instructions. Cardiologist: CAV/AHFC      Post discharge follow up phone call to be made within 48-72 hours of discharge.

## 2019-05-22 NOTE — PROGRESS NOTES
1230:  Bedside and Verbal shift change report given to SCARLETT RN by García Chris RN. Report included the following information SBAR, Kardex, Intake/Output, MAR, Recent Results and Cardiac Rhythm NSR.    1600:  Patient back in bed for echo. 2000:  Bedside and Verbal shift change report given to CARLA ANTONIO (oncoming nurse) by Chayo Man RN (offgoing nurse). Report included the following information SBAR, Kardex, Intake/Output, MAR and Recent Results.

## 2019-05-22 NOTE — PROGRESS NOTES
Progress Note    Patient: Gorge Sood MRN: [id number]  SSN: [id number]    YOB: 1960  Age: 66 y.o. Sex: female      Admit Date: 5/18/2019    LOS: 4 days     Subjective:     65 yo WF with non-STEMI, mild CAD, stress cardiomyopathy in the pattern of Takotsubo. Developed declining hemodynamic function yesterday and required increased vasopressor support and emergent impella by CT surgery. Pt now off vasopressin and paresh. On cardene. She says she feels better today. SOB much improved. Denies chest pain, dizziness. Still with a little cough. Says she intends to quit smoking. Objective:     Vitals:    05/22/19 0700 05/22/19 0800 05/22/19 0900 05/22/19 1000   BP:       Pulse: 67 68 71 75   Resp: 12 18 14 19   Temp:  97.7 °F (36.5 °C)     SpO2: 97% 99% 98% 98%   Weight:       Height:          Cardiac index 2.6, SVO2 75. Intake and Output:  Current Shift: 05/22 0701 - 05/22 1900  In: -   Out: 110 [Urine:110]  Last three shifts: 05/20 1901 - 05/22 0700  In: 5617.8 [P.O.:1200; I.V.:4417.8]  Out: 6925 [Urine:6875]    Physical Exam:   General:  Alert, cooperative, no distress, appears older than stated age. Sitting up in chair   Eyes:  Conjunctivae/corneas clear. Pupils equal   Ears:  Hearing normal   Nose: Nares normal. Septum midline. Mucosa normal. No drainage   Mouth/Throat: Lips, mucosa, and tongue normal. Teeth and gums normal.   Neck: Supple, symmetrical, trachea midline, no adenopathy, Rt IJ swan in place. no carotid bruit and no JVD. Back:   Symmetric, no curvature. Lungs:   Diminished to auscultation bilateral bases. no wheeze, rales   Heart:  Regular rate and rhythm, S1, S2 normal, no murmur, click, rub or gallop. Abdomen:   Soft, non-distended, nontender. Bowel sounds normal. No masses,  No organomegaly. Extremities: Extremities normal, atraumatic, no cyanosis or edema. Pulses: 1+ and symmetric all extremities.    Skin: Skin color, texture, turgor normal. No rashes or lesions Neurologic: CNII-XII grossly intact. CHRISTOPHER,        Lab/Data Review:  BMP:   Lab Results   Component Value Date/Time     05/22/2019 03:51 AM    K 3.5 05/22/2019 03:51 AM     05/22/2019 03:51 AM    CO2 27 05/22/2019 03:51 AM    AGAP 6 05/22/2019 03:51 AM    GLU 57 (L) 05/22/2019 03:51 AM    BUN 12 05/22/2019 03:51 AM    CREA 1.02 05/22/2019 03:51 AM    GFRAA >60 05/22/2019 03:51 AM    GFRNA 56 (L) 05/22/2019 03:51 AM     CMP:   Lab Results   Component Value Date/Time     05/22/2019 03:51 AM    K 3.5 05/22/2019 03:51 AM     05/22/2019 03:51 AM    CO2 27 05/22/2019 03:51 AM    AGAP 6 05/22/2019 03:51 AM    GLU 57 (L) 05/22/2019 03:51 AM    BUN 12 05/22/2019 03:51 AM    CREA 1.02 05/22/2019 03:51 AM    GFRAA >60 05/22/2019 03:51 AM    GFRNA 56 (L) 05/22/2019 03:51 AM    CA 8.6 05/22/2019 03:51 AM    ALB 2.2 (L) 05/21/2019 06:00 PM    TP 5.5 (L) 05/21/2019 06:00 PM    GLOB 3.3 05/21/2019 06:00 PM    AGRAT 0.7 (L) 05/21/2019 06:00 PM    SGOT 27 05/21/2019 06:00 PM    ALT 26 05/21/2019 06:00 PM         Assessment:     Active Problems:    [facility]non[facility] (5/18/2019)      Chest pain (5/18/2019)      Overview: Added automatically from request for surgery [id number]      CHF (congestive heart failure), NYHA class IV, acute on chronic, systolic ([facility] (7/75/2613)      Overview: Added automatically from request for surgery [id number]      Abnormal TSH/Free T3  Nonobstructive, mild CAD:   Left Anterior Descending   Ost LAD to Prox LAD lesion 20% stenosed. .   Prox LAD lesion 30% stenosed. .   Mid LAD lesion 60% stenosed. .   Left Circumflex   Prox Cx to Mid Cx lesion 30% stenosed. .   Right Coronary Artery   Prox RCA to Mid RCA lesion 30% stenosed. Agnes Mar NSVT    Plan:     1. Stress cardiomyopathy/Acute HFrEF:  EF 20-25%   -AHF following.  NYHA IV on admission   -Impella placed by CT surgery for cardiogenic shock/ hemodyanamic decline 5/21.-management per CT surgery   -CI now >2 off inotrope (dobutamine stopped 9AM). Hemodynamics normalized with impella, off vasopresors, Currently on cardene at 5 mg/hr   -Aldactone 12.5 mg BID    -net neg 127 mls/24 hours. Lungs clear, no edema. No diuretics (except aldactone)    2. CAD, mild: (see above)   -ASA. Not on statin currently. HDL 46, LDL 77.6    3. Endocrine derangement:   -Low TSH, free T3   -Concern for central pituitary deficiency. Per Bard Vargas- endocrinology to see. -On hydrocortisone. 4.Tobacco abuse:    -Discussed/encouraged smoking cessation. 5. NSVT: couple runs of brief NSVT, (5-7 beats)   -irregular wide complex rhythm. K=3.5. Will check magnesium      66 y.o. With stress cardiomyopathy who had hemodynamic decline yesterday requiring vasosupport and emergent impella. Now hemodynamic status improved with impella;off vasopressors. On cardene per CT surgery. Pt feels better today, SOB much improved. AHF/CT surgery following. Check magnesium. Signed By: Paul Santizo.  INDIRA Ibanez     May 22, 2019

## 2019-05-22 NOTE — PROGRESS NOTES
2000: Received report from ANDRE RN. Richa dual verified. Plan of care discussed including to leave dobutamine gtt on overnight, keep MAP 70-90 and flush Impella device Q2. Family and visitors at bedside, security code and unit information provided to boyfriend, Lopez Zeng, at this time. Opportunities for questions and concerns provided. 2030: Cardene started, MAP: 101.    2100: Pt's nephew and niece at bedside. Impella rep on telephone, updated on hemodynamics. 0400: HYPOGLYCEMIC EPISODE DOCUMENTATION  BG value(s) pre-treatment 58    Was patient symptomatic? [] yes, [x] no  Patient was treated with the following rescue medications/treatments: [] D50                [] Glucose tablets                [] Glucagon                [x] 4oz juice                [] 6oz reg soda                [] 8oz low fat milk  BG value post-treatment: 87    Once BG treated and value greater than 80mg/dl, pt was provided with the following:  [x] snack  [] meal    0615: Pt OOB to chair with 2 assist without difficulty. 0730: Dr. Michael Miranda and Jelani Sahu NP at bedside, updated on pt status. Plan to start bivalrudin through impella today. 0800: Bedside and Verbal shift change report given to Anthonette Brunner, RN (oncoming nurse) by Evert Caceres (offgoing nurse). Report included the following information SBAR, Kardex, ED Summary, OR Summary, Procedure Summary, Intake/Output, MAR, Recent Results, Med Rec Status, Cardiac Rhythm NSR with PVCS and Alarm Parameters .                Problem: Cath Lab Procedures: Post-Cath Day of Procedure (Initiate SCIP Measures for Post-Op Care)  Goal: Activity/Safety  Outcome: Progressing Towards Goal  Goal: Diagnostic Test/Procedures  Outcome: Progressing Towards Goal  Goal: Nutrition/Diet  Outcome: Progressing Towards Goal  Goal: Respiratory  Outcome: Progressing Towards Goal  Goal: Treatments/Interventions/Procedures  Outcome: Progressing Towards Goal  Goal: Psychosocial  Outcome: Progressing Towards Goal  Goal: *Hemodynamically stable  Outcome: Progressing Towards Goal  Goal: *Optimal pain control at patient's stated goal  Outcome: Progressing Towards Goal     Problem: Unstable Angina/NSTEMI: Discharge Outcomes  Goal: *Hemodynamically stable  Outcome: Progressing Towards Goal  Goal: *Stable cardiac rhythm  Outcome: Progressing Towards Goal  Goal: *Lungs clear or at baseline  Outcome: Progressing Towards Goal  Goal: *Optimal pain control at patient's stated goal  Outcome: Progressing Towards Goal  Goal: *Identifies cardiac risk factors  Outcome: Progressing Towards Goal  Goal: *Verbalizes understanding and describes prescribed diet  Outcome: Progressing Towards Goal  Goal: *Anxiety reduced or absent  Outcome: Progressing Towards Goal  Goal: *Understands and describes signs and symptoms to report to providers(Stroke Metric)  Outcome: Progressing Towards Goal  Goal: *Describes available resources and support systems  Outcome: Progressing Towards Goal  Goal: *Describes smoking cessation resources  Outcome: Progressing Towards Goal     Problem: Heart Failure: Day 2  Goal: Activity/Safety  Outcome: Progressing Towards Goal  Goal: Diagnostic Test/Procedures  Outcome: Progressing Towards Goal  Goal: Nutrition/Diet  Outcome: Progressing Towards Goal  Goal: Discharge Planning  Outcome: Progressing Towards Goal  Goal: Medications  Outcome: Progressing Towards Goal  Goal: Respiratory  Outcome: Progressing Towards Goal  Goal: Psychosocial  Outcome: Progressing Towards Goal  Goal: *Oxygen saturation within defined limits  Outcome: Progressing Towards Goal  Goal: *Hemodynamically stable  Outcome: Progressing Towards Goal  Goal: *Optimal pain control at patient's stated goal  Outcome: Progressing Towards Goal  Goal: *Anxiety reduced or absent  Outcome: Progressing Towards Goal  Goal: *Demonstrates progressive activity  Outcome: Progressing Towards Goal     Problem: Falls - Risk of  Goal: *Absence of Falls  Description  Document Cheli Stephen Fall Risk and appropriate interventions in the flowsheet. Outcome: Progressing Towards Goal     Problem: Pressure Injury - Risk of  Goal: *Prevention of pressure injury  Description  Document Salvatore Scale and appropriate interventions in the flowsheet.   Outcome: Progressing Towards Goal     Problem: Patient Education: Go to Patient Education Activity  Goal: Patient/Family Education  Outcome: Progressing Towards Goal

## 2019-05-23 ENCOUNTER — APPOINTMENT (OUTPATIENT)
Dept: GENERAL RADIOLOGY | Age: 59
DRG: 215 | End: 2019-05-23
Attending: NURSE PRACTITIONER
Payer: MEDICARE

## 2019-05-23 LAB
ADMINISTERED INITIALS, ADMINIT: NORMAL
ALBUMIN SERPL-MCNC: 2.4 G/DL (ref 3.5–5)
ALBUMIN/GLOB SERPL: 0.7 {RATIO} (ref 1.1–2.2)
ALP SERPL-CCNC: 110 U/L (ref 45–117)
ALT SERPL-CCNC: 17 U/L (ref 12–78)
ANA SER QL: NEGATIVE
ANION GAP SERPL CALC-SCNC: 5 MMOL/L (ref 5–15)
ANION GAP SERPL CALC-SCNC: 6 MMOL/L (ref 5–15)
ANION GAP SERPL CALC-SCNC: 6 MMOL/L (ref 5–15)
APPEARANCE UR: ABNORMAL
APTT PPP: 62.1 SEC (ref 22.1–32)
AST SERPL-CCNC: 37 U/L (ref 15–37)
B BURGDOR C6 AB SER IA-ACNC: <0.91 INDEX (ref 0–0.9)
BACTERIA URNS QL MICRO: ABNORMAL /HPF
BASOPHILS # BLD: 0 K/UL (ref 0–0.1)
BASOPHILS NFR BLD: 0 % (ref 0–1)
BILIRUB SERPL-MCNC: 0.4 MG/DL (ref 0.2–1)
BILIRUB UR QL: NEGATIVE
BNP SERPL-MCNC: 9414 PG/ML
BUN SERPL-MCNC: 12 MG/DL (ref 6–20)
BUN SERPL-MCNC: 12 MG/DL (ref 6–20)
BUN SERPL-MCNC: 14 MG/DL (ref 6–20)
BUN/CREAT SERPL: 14 (ref 12–20)
BUN/CREAT SERPL: 16 (ref 12–20)
BUN/CREAT SERPL: 17 (ref 12–20)
CALCIUM SERPL-MCNC: 8.2 MG/DL (ref 8.5–10.1)
CALCIUM SERPL-MCNC: 8.3 MG/DL (ref 8.5–10.1)
CALCIUM SERPL-MCNC: 8.3 MG/DL (ref 8.5–10.1)
CHLORIDE SERPL-SCNC: 105 MMOL/L (ref 97–108)
CHLORIDE SERPL-SCNC: 106 MMOL/L (ref 97–108)
CHLORIDE SERPL-SCNC: 107 MMOL/L (ref 97–108)
CO2 SERPL-SCNC: 27 MMOL/L (ref 21–32)
CO2 SERPL-SCNC: 28 MMOL/L (ref 21–32)
CO2 SERPL-SCNC: 28 MMOL/L (ref 21–32)
COLOR UR: ABNORMAL
COXSACKIE A7 IGM, 163291: NEGATIVE TITER
COXSACKIE A7 IGM, 163291: NEGATIVE TITER
CREAT SERPL-MCNC: 0.76 MG/DL (ref 0.55–1.02)
CREAT SERPL-MCNC: 0.83 MG/DL (ref 0.55–1.02)
CREAT SERPL-MCNC: 0.84 MG/DL (ref 0.55–1.02)
CV A16 IGG TITR SER IF: NORMAL TITER
CV A16 IGG TITR SER IF: NORMAL TITER
CV A16 IGM TITR SER IF: NEGATIVE TITER
CV A16 IGM TITR SER IF: NEGATIVE TITER
CV A24 IGG TITR SER IF: NEGATIVE TITER
CV A24 IGG TITR SER IF: NEGATIVE TITER
CV A24 IGM TITR SER IF: NEGATIVE TITER
CV A24 IGM TITR SER IF: NEGATIVE TITER
CV A7 IGG TITR SER IF: NEGATIVE TITER
CV A7 IGG TITR SER IF: NEGATIVE TITER
CV A9 IGG TITR SER IF: NORMAL TITER
CV A9 IGG TITR SER IF: NORMAL TITER
CV A9 IGM TITR SER IF: NEGATIVE TITER
CV A9 IGM TITR SER IF: NEGATIVE TITER
D50 ADMINISTERED, D50ADM: 0 ML
D50 ADMINISTERED, D50ADM: 10 ML
D50 ORDER, D50ORD: 0 ML
D50 ORDER, D50ORD: 10 ML
DIFFERENTIAL METHOD BLD: ABNORMAL
EOSINOPHIL # BLD: 0 K/UL (ref 0–0.4)
EOSINOPHIL NFR BLD: 0 % (ref 0–7)
EPITH CASTS URNS QL MICRO: ABNORMAL /LPF
ERYTHROCYTE [DISTWIDTH] IN BLOOD BY AUTOMATED COUNT: 13.2 % (ref 11.5–14.5)
ERYTHROCYTE [SEDIMENTATION RATE] IN BLOOD: 44 MM/HR (ref 0–30)
GLOBULIN SER CALC-MCNC: 3.3 G/DL (ref 2–4)
GLSCOM COMMENTS: NORMAL
GLUCOSE BLD STRIP.AUTO-MCNC: 104 MG/DL (ref 65–100)
GLUCOSE BLD STRIP.AUTO-MCNC: 105 MG/DL (ref 65–100)
GLUCOSE BLD STRIP.AUTO-MCNC: 114 MG/DL (ref 65–100)
GLUCOSE BLD STRIP.AUTO-MCNC: 121 MG/DL (ref 65–100)
GLUCOSE BLD STRIP.AUTO-MCNC: 124 MG/DL (ref 65–100)
GLUCOSE BLD STRIP.AUTO-MCNC: 127 MG/DL (ref 65–100)
GLUCOSE BLD STRIP.AUTO-MCNC: 131 MG/DL (ref 65–100)
GLUCOSE BLD STRIP.AUTO-MCNC: 138 MG/DL (ref 65–100)
GLUCOSE BLD STRIP.AUTO-MCNC: 141 MG/DL (ref 65–100)
GLUCOSE BLD STRIP.AUTO-MCNC: 167 MG/DL (ref 65–100)
GLUCOSE BLD STRIP.AUTO-MCNC: 173 MG/DL (ref 65–100)
GLUCOSE BLD STRIP.AUTO-MCNC: 184 MG/DL (ref 65–100)
GLUCOSE BLD STRIP.AUTO-MCNC: 214 MG/DL (ref 65–100)
GLUCOSE BLD STRIP.AUTO-MCNC: 222 MG/DL (ref 65–100)
GLUCOSE BLD STRIP.AUTO-MCNC: 243 MG/DL (ref 65–100)
GLUCOSE BLD STRIP.AUTO-MCNC: 75 MG/DL (ref 65–100)
GLUCOSE BLD STRIP.AUTO-MCNC: 85 MG/DL (ref 65–100)
GLUCOSE BLD STRIP.AUTO-MCNC: 87 MG/DL (ref 65–100)
GLUCOSE BLD STRIP.AUTO-MCNC: 92 MG/DL (ref 65–100)
GLUCOSE BLD STRIP.AUTO-MCNC: 93 MG/DL (ref 65–100)
GLUCOSE SERPL-MCNC: 118 MG/DL (ref 65–100)
GLUCOSE SERPL-MCNC: 157 MG/DL (ref 65–100)
GLUCOSE SERPL-MCNC: 210 MG/DL (ref 65–100)
GLUCOSE UR STRIP.AUTO-MCNC: NEGATIVE MG/DL
GLUCOSE, GLC: 103 MG/DL
GLUCOSE, GLC: 104 MG/DL
GLUCOSE, GLC: 114 MG/DL
GLUCOSE, GLC: 121 MG/DL
GLUCOSE, GLC: 124 MG/DL
GLUCOSE, GLC: 127 MG/DL
GLUCOSE, GLC: 131 MG/DL
GLUCOSE, GLC: 138 MG/DL
GLUCOSE, GLC: 141 MG/DL
GLUCOSE, GLC: 167 MG/DL
GLUCOSE, GLC: 173 MG/DL
GLUCOSE, GLC: 184 MG/DL
GLUCOSE, GLC: 214 MG/DL
GLUCOSE, GLC: 222 MG/DL
GLUCOSE, GLC: 243 MG/DL
GLUCOSE, GLC: 76 MG/DL
GLUCOSE, GLC: 85 MG/DL
GLUCOSE, GLC: 87 MG/DL
GLUCOSE, GLC: 92 MG/DL
GLUCOSE, GLC: 93 MG/DL
HCT VFR BLD AUTO: 29.3 % (ref 35–47)
HGB BLD-MCNC: 9.5 G/DL (ref 11.5–16)
HGB UR QL STRIP: ABNORMAL
HIGH TARGET, HITG: 140 MG/DL
IMM GRANULOCYTES # BLD AUTO: 0.1 K/UL (ref 0–0.04)
IMM GRANULOCYTES NFR BLD AUTO: 1 % (ref 0–0.5)
INSULIN ADMINSTERED, INSADM: 0 UNITS/HOUR
INSULIN ADMINSTERED, INSADM: 0.8 UNITS/HOUR
INSULIN ADMINSTERED, INSADM: 1.1 UNITS/HOUR
INSULIN ADMINSTERED, INSADM: 1.1 UNITS/HOUR
INSULIN ADMINSTERED, INSADM: 1.2 UNITS/HOUR
INSULIN ADMINSTERED, INSADM: 1.5 UNITS/HOUR
INSULIN ADMINSTERED, INSADM: 2.3 UNITS/HOUR
INSULIN ADMINSTERED, INSADM: 2.5 UNITS/HOUR
INSULIN ADMINSTERED, INSADM: 3 UNITS/HOUR
INSULIN ADMINSTERED, INSADM: 3.4 UNITS/HOUR
INSULIN ADMINSTERED, INSADM: 3.5 UNITS/HOUR
INSULIN ADMINSTERED, INSADM: 4.3 UNITS/HOUR
INSULIN ADMINSTERED, INSADM: 4.9 UNITS/HOUR
INSULIN ADMINSTERED, INSADM: 6.3 UNITS/HOUR
INSULIN ADMINSTERED, INSADM: 6.4 UNITS/HOUR
INSULIN ADMINSTERED, INSADM: 6.8 UNITS/HOUR
INSULIN ADMINSTERED, INSADM: 6.9 UNITS/HOUR
INSULIN ADMINSTERED, INSADM: 7.3 UNITS/HOUR
INSULIN ORDER, INSORD: 0 UNITS/HOUR
INSULIN ORDER, INSORD: 0.8 UNITS/HOUR
INSULIN ORDER, INSORD: 0.9 UNITS/HOUR
INSULIN ORDER, INSORD: 1.1 UNITS/HOUR
INSULIN ORDER, INSORD: 1.1 UNITS/HOUR
INSULIN ORDER, INSORD: 1.2 UNITS/HOUR
INSULIN ORDER, INSORD: 1.3 UNITS/HOUR
INSULIN ORDER, INSORD: 1.5 UNITS/HOUR
INSULIN ORDER, INSORD: 2.3 UNITS/HOUR
INSULIN ORDER, INSORD: 2.5 UNITS/HOUR
INSULIN ORDER, INSORD: 3 UNITS/HOUR
INSULIN ORDER, INSORD: 3.4 UNITS/HOUR
INSULIN ORDER, INSORD: 3.5 UNITS/HOUR
INSULIN ORDER, INSORD: 4.3 UNITS/HOUR
INSULIN ORDER, INSORD: 4.9 UNITS/HOUR
INSULIN ORDER, INSORD: 6.3 UNITS/HOUR
INSULIN ORDER, INSORD: 6.4 UNITS/HOUR
INSULIN ORDER, INSORD: 6.8 UNITS/HOUR
INSULIN ORDER, INSORD: 6.9 UNITS/HOUR
INSULIN ORDER, INSORD: 7.3 UNITS/HOUR
KETONES UR QL STRIP.AUTO: NEGATIVE MG/DL
LACTATE SERPL-SCNC: 0.7 MMOL/L (ref 0.4–2)
LACTATE SERPL-SCNC: 0.9 MMOL/L (ref 0.4–2)
LACTATE SERPL-SCNC: 1 MMOL/L (ref 0.4–2)
LACTATE SERPL-SCNC: 1.4 MMOL/L (ref 0.4–2)
LDH SERPL L TO P-CCNC: 668 U/L (ref 81–246)
LEUKOCYTE ESTERASE UR QL STRIP.AUTO: ABNORMAL
LOW TARGET, LOT: 100 MG/DL
LYMPHOCYTES # BLD: 1 K/UL (ref 0.8–3.5)
LYMPHOCYTES NFR BLD: 7 % (ref 12–49)
MAGNESIUM SERPL-MCNC: 1.5 MG/DL (ref 1.6–2.4)
MCH RBC QN AUTO: 28.3 PG (ref 26–34)
MCHC RBC AUTO-ENTMCNC: 32.4 G/DL (ref 30–36.5)
MCV RBC AUTO: 87.2 FL (ref 80–99)
MINUTES UNTIL NEXT BG, NBG: 15 MIN
MINUTES UNTIL NEXT BG, NBG: 60 MIN
MONOCYTES # BLD: 0.5 K/UL (ref 0–1)
MONOCYTES NFR BLD: 4 % (ref 5–13)
MULTIPLIER, MUL: 0.03
MULTIPLIER, MUL: 0.04
MULTIPLIER, MUL: 0.05
MULTIPLIER, MUL: 0.06
NEUTS SEG # BLD: 12.9 K/UL (ref 1.8–8)
NEUTS SEG NFR BLD: 88 % (ref 32–75)
NITRITE UR QL STRIP.AUTO: NEGATIVE
NRBC # BLD: 0 K/UL (ref 0–0.01)
NRBC BLD-RTO: 0 PER 100 WBC
ORDER INITIALS, ORDINIT: NORMAL
PH UR STRIP: 5.5 [PH] (ref 5–8)
PLATELET # BLD AUTO: 227 K/UL (ref 150–400)
PMV BLD AUTO: 9.3 FL (ref 8.9–12.9)
POTASSIUM SERPL-SCNC: 3.2 MMOL/L (ref 3.5–5.1)
POTASSIUM SERPL-SCNC: 3.9 MMOL/L (ref 3.5–5.1)
POTASSIUM SERPL-SCNC: 4 MMOL/L (ref 3.5–5.1)
PREALB SERPL-MCNC: 11.9 MG/DL (ref 20–40)
PREALB SERPL-MCNC: 12.4 MG/DL (ref 20–40)
PROCALCITONIN SERPL-MCNC: 0.1 NG/ML
PROT SERPL-MCNC: 5.7 G/DL (ref 6.4–8.2)
PROT UR STRIP-MCNC: NEGATIVE MG/DL
RBC # BLD AUTO: 3.36 M/UL (ref 3.8–5.2)
RBC #/AREA URNS HPF: ABNORMAL /HPF (ref 0–5)
SERVICE CMNT-IMP: ABNORMAL
SERVICE CMNT-IMP: NORMAL
SODIUM SERPL-SCNC: 139 MMOL/L (ref 136–145)
SODIUM SERPL-SCNC: 139 MMOL/L (ref 136–145)
SODIUM SERPL-SCNC: 140 MMOL/L (ref 136–145)
SP GR UR REFRACTOMETRY: 1.02 (ref 1–1.03)
T4 FREE SERPL-MCNC: 1.1 NG/DL (ref 0.8–1.5)
T4 FREE SERPL-MCNC: 1.2 NG/DL (ref 0.8–1.5)
THERAPEUTIC RANGE,PTTT: ABNORMAL SECS (ref 58–77)
UR CULT HOLD, URHOLD: NORMAL
UROBILINOGEN UR QL STRIP.AUTO: 0.2 EU/DL (ref 0.2–1)
WBC # BLD AUTO: 14.6 K/UL (ref 3.6–11)
WBC URNS QL MICRO: ABNORMAL /HPF (ref 0–4)

## 2019-05-23 PROCEDURE — 81001 URINALYSIS AUTO W/SCOPE: CPT

## 2019-05-23 PROCEDURE — 85730 THROMBOPLASTIN TIME PARTIAL: CPT

## 2019-05-23 PROCEDURE — 74011250636 HC RX REV CODE- 250/636: Performed by: THORACIC SURGERY (CARDIOTHORACIC VASCULAR SURGERY)

## 2019-05-23 PROCEDURE — 82962 GLUCOSE BLOOD TEST: CPT

## 2019-05-23 PROCEDURE — 74011000250 HC RX REV CODE- 250: Performed by: NURSE PRACTITIONER

## 2019-05-23 PROCEDURE — 85025 COMPLETE CBC W/AUTO DIFF WBC: CPT

## 2019-05-23 PROCEDURE — 74011000250 HC RX REV CODE- 250: Performed by: THORACIC SURGERY (CARDIOTHORACIC VASCULAR SURGERY)

## 2019-05-23 PROCEDURE — 80053 COMPREHEN METABOLIC PANEL: CPT

## 2019-05-23 PROCEDURE — 83605 ASSAY OF LACTIC ACID: CPT

## 2019-05-23 PROCEDURE — 74011250637 HC RX REV CODE- 250/637: Performed by: HOSPITALIST

## 2019-05-23 PROCEDURE — 74011250637 HC RX REV CODE- 250/637: Performed by: NURSE PRACTITIONER

## 2019-05-23 PROCEDURE — 74011636637 HC RX REV CODE- 636/637: Performed by: INTERNAL MEDICINE

## 2019-05-23 PROCEDURE — 74011250636 HC RX REV CODE- 250/636: Performed by: NURSE PRACTITIONER

## 2019-05-23 PROCEDURE — 74011636637 HC RX REV CODE- 636/637: Performed by: THORACIC SURGERY (CARDIOTHORACIC VASCULAR SURGERY)

## 2019-05-23 PROCEDURE — 36415 COLL VENOUS BLD VENIPUNCTURE: CPT

## 2019-05-23 PROCEDURE — 74011000250 HC RX REV CODE- 250: Performed by: HOSPITALIST

## 2019-05-23 PROCEDURE — 74011000258 HC RX REV CODE- 258: Performed by: THORACIC SURGERY (CARDIOTHORACIC VASCULAR SURGERY)

## 2019-05-23 PROCEDURE — 84145 PROCALCITONIN (PCT): CPT

## 2019-05-23 PROCEDURE — 65610000003 HC RM ICU SURGICAL

## 2019-05-23 PROCEDURE — 84134 ASSAY OF PREALBUMIN: CPT

## 2019-05-23 PROCEDURE — 74011000258 HC RX REV CODE- 258: Performed by: NURSE PRACTITIONER

## 2019-05-23 PROCEDURE — 94640 AIRWAY INHALATION TREATMENT: CPT

## 2019-05-23 PROCEDURE — 71045 X-RAY EXAM CHEST 1 VIEW: CPT

## 2019-05-23 PROCEDURE — 93005 ELECTROCARDIOGRAM TRACING: CPT

## 2019-05-23 PROCEDURE — 83615 LACTATE (LD) (LDH) ENZYME: CPT

## 2019-05-23 PROCEDURE — 83880 ASSAY OF NATRIURETIC PEPTIDE: CPT

## 2019-05-23 PROCEDURE — 83051 HEMOGLOBIN PLASMA: CPT

## 2019-05-23 PROCEDURE — 83735 ASSAY OF MAGNESIUM: CPT

## 2019-05-23 PROCEDURE — 84439 ASSAY OF FREE THYROXINE: CPT

## 2019-05-23 PROCEDURE — 74011250637 HC RX REV CODE- 250/637: Performed by: INTERNAL MEDICINE

## 2019-05-23 PROCEDURE — 80048 BASIC METABOLIC PNL TOTAL CA: CPT

## 2019-05-23 PROCEDURE — 77030027138 HC INCENT SPIROMETER -A

## 2019-05-23 PROCEDURE — 99291 CRITICAL CARE FIRST HOUR: CPT | Performed by: INTERNAL MEDICINE

## 2019-05-23 PROCEDURE — 85652 RBC SED RATE AUTOMATED: CPT

## 2019-05-23 RX ORDER — SPIRONOLACTONE 25 MG/1
25 TABLET ORAL 2 TIMES DAILY
Status: DISCONTINUED | OUTPATIENT
Start: 2019-05-23 | End: 2019-05-31

## 2019-05-23 RX ORDER — POLYETHYLENE GLYCOL 3350 17 G/17G
17 POWDER, FOR SOLUTION ORAL DAILY
Status: DISCONTINUED | OUTPATIENT
Start: 2019-05-23 | End: 2019-06-04 | Stop reason: HOSPADM

## 2019-05-23 RX ORDER — SPIRONOLACTONE 25 MG/1
25 TABLET ORAL 2 TIMES DAILY
Status: CANCELLED | OUTPATIENT
Start: 2019-05-23

## 2019-05-23 RX ORDER — MAGNESIUM SULFATE HEPTAHYDRATE 40 MG/ML
2 INJECTION, SOLUTION INTRAVENOUS ONCE
Status: COMPLETED | OUTPATIENT
Start: 2019-05-23 | End: 2019-05-23

## 2019-05-23 RX ORDER — POTASSIUM CHLORIDE 29.8 MG/ML
20 INJECTION INTRAVENOUS
Status: COMPLETED | OUTPATIENT
Start: 2019-05-23 | End: 2019-05-23

## 2019-05-23 RX ORDER — POTASSIUM CHLORIDE 750 MG/1
40 TABLET, FILM COATED, EXTENDED RELEASE ORAL 3 TIMES DAILY
Status: DISCONTINUED | OUTPATIENT
Start: 2019-05-23 | End: 2019-05-30

## 2019-05-23 RX ORDER — INSULIN GLARGINE 100 [IU]/ML
7 INJECTION, SOLUTION SUBCUTANEOUS ONCE
Status: COMPLETED | OUTPATIENT
Start: 2019-05-23 | End: 2019-05-23

## 2019-05-23 RX ORDER — AMOXICILLIN 250 MG
1 CAPSULE ORAL DAILY
Status: DISCONTINUED | OUTPATIENT
Start: 2019-05-23 | End: 2019-06-04 | Stop reason: HOSPADM

## 2019-05-23 RX ORDER — DIGOXIN 125 MCG
0.12 TABLET ORAL DAILY
Status: DISCONTINUED | OUTPATIENT
Start: 2019-05-23 | End: 2019-05-24

## 2019-05-23 RX ADMIN — OXYCODONE HYDROCHLORIDE AND ACETAMINOPHEN 1 TABLET: 5; 325 TABLET ORAL at 08:21

## 2019-05-23 RX ADMIN — ACETAMINOPHEN 650 MG: 325 TABLET ORAL at 20:49

## 2019-05-23 RX ADMIN — ASPIRIN 81 MG: 81 TABLET ORAL at 08:16

## 2019-05-23 RX ADMIN — BUDESONIDE 250 MCG: 0.25 INHALANT RESPIRATORY (INHALATION) at 21:43

## 2019-05-23 RX ADMIN — INSULIN GLARGINE 7 UNITS: 100 INJECTION, SOLUTION SUBCUTANEOUS at 10:34

## 2019-05-23 RX ADMIN — MAGNESIUM SULFATE HEPTAHYDRATE 2 G: 40 INJECTION, SOLUTION INTRAVENOUS at 11:16

## 2019-05-23 RX ADMIN — Medication 2 G: at 00:45

## 2019-05-23 RX ADMIN — HYDROCORTISONE SODIUM SUCCINATE 100 MG: 100 INJECTION, POWDER, FOR SOLUTION INTRAMUSCULAR; INTRAVENOUS at 05:59

## 2019-05-23 RX ADMIN — POTASSIUM CHLORIDE 20 MEQ: 400 INJECTION, SOLUTION INTRAVENOUS at 10:08

## 2019-05-23 RX ADMIN — DIGOXIN 0.12 MG: 125 TABLET ORAL at 09:36

## 2019-05-23 RX ADMIN — SODIUM CHLORIDE 5 MG/HR: 900 INJECTION, SOLUTION INTRAVENOUS at 20:02

## 2019-05-23 RX ADMIN — HYDROCORTISONE SODIUM SUCCINATE 100 MG: 100 INJECTION, POWDER, FOR SOLUTION INTRAMUSCULAR; INTRAVENOUS at 20:49

## 2019-05-23 RX ADMIN — SODIUM CHLORIDE 6.9 UNITS/HR: 900 INJECTION, SOLUTION INTRAVENOUS at 15:33

## 2019-05-23 RX ADMIN — POTASSIUM CHLORIDE 40 MEQ: 750 TABLET, FILM COATED, EXTENDED RELEASE ORAL at 16:20

## 2019-05-23 RX ADMIN — PANTOPRAZOLE SODIUM 40 MG: 40 TABLET, DELAYED RELEASE ORAL at 07:05

## 2019-05-23 RX ADMIN — SPIRONOLACTONE 12.5 MG: 25 TABLET, FILM COATED ORAL at 08:17

## 2019-05-23 RX ADMIN — SODIUM CHLORIDE 5 MG/HR: 900 INJECTION, SOLUTION INTRAVENOUS at 15:42

## 2019-05-23 RX ADMIN — POTASSIUM CHLORIDE 20 MEQ: 400 INJECTION, SOLUTION INTRAVENOUS at 08:10

## 2019-05-23 RX ADMIN — SODIUM CHLORIDE 9 ML/HR: 900 INJECTION, SOLUTION INTRAVENOUS at 15:44

## 2019-05-23 RX ADMIN — CLONAZEPAM 0.5 MG: 0.5 TABLET ORAL at 20:49

## 2019-05-23 RX ADMIN — POTASSIUM CHLORIDE 20 MEQ: 750 TABLET, EXTENDED RELEASE ORAL at 08:16

## 2019-05-23 RX ADMIN — SODIUM CHLORIDE 5 MG/HR: 900 INJECTION, SOLUTION INTRAVENOUS at 04:01

## 2019-05-23 RX ADMIN — POLYETHYLENE GLYCOL 3350 17 G: 17 POWDER, FOR SOLUTION ORAL at 12:32

## 2019-05-23 RX ADMIN — BIVALIRUDIN 12.4 ML/HR: 250 INJECTION, POWDER, LYOPHILIZED, FOR SOLUTION INTRAVENOUS at 17:50

## 2019-05-23 RX ADMIN — SPIRONOLACTONE 25 MG: 25 TABLET ORAL at 17:51

## 2019-05-23 RX ADMIN — BIVALIRUDIN 12.5 ML/HR: 250 INJECTION, POWDER, LYOPHILIZED, FOR SOLUTION INTRAVENOUS at 01:27

## 2019-05-23 RX ADMIN — OXYCODONE HYDROCHLORIDE AND ACETAMINOPHEN 1 TABLET: 5; 325 TABLET ORAL at 16:20

## 2019-05-23 RX ADMIN — SENNOSIDES,DOCUSATE SODIUM 1 TABLET: 8.6; 5 TABLET, FILM COATED ORAL at 12:32

## 2019-05-23 RX ADMIN — Medication 2 G: at 16:21

## 2019-05-23 RX ADMIN — HYDROCORTISONE SODIUM SUCCINATE 100 MG: 100 INJECTION, POWDER, FOR SOLUTION INTRAMUSCULAR; INTRAVENOUS at 12:32

## 2019-05-23 RX ADMIN — BUDESONIDE 250 MCG: 0.25 INHALANT RESPIRATORY (INHALATION) at 08:40

## 2019-05-23 RX ADMIN — POTASSIUM CHLORIDE 40 MEQ: 750 TABLET, FILM COATED, EXTENDED RELEASE ORAL at 23:11

## 2019-05-23 RX ADMIN — OXYCODONE HYDROCHLORIDE AND ACETAMINOPHEN 1 TABLET: 5; 325 TABLET ORAL at 12:40

## 2019-05-23 RX ADMIN — SODIUM CHLORIDE 5 MG/HR: 900 INJECTION, SOLUTION INTRAVENOUS at 09:07

## 2019-05-23 RX ADMIN — Medication 10 ML: at 15:40

## 2019-05-23 RX ADMIN — Medication 2 G: at 07:05

## 2019-05-23 RX ADMIN — POTASSIUM CHLORIDE 20 MEQ: 400 INJECTION, SOLUTION INTRAVENOUS at 05:59

## 2019-05-23 RX ADMIN — CLONAZEPAM 0.5 MG: 0.5 TABLET ORAL at 00:45

## 2019-05-23 RX ADMIN — IRON SUCROSE 200 MG: 20 INJECTION, SOLUTION INTRAVENOUS at 09:32

## 2019-05-23 NOTE — PROGRESS NOTES
Progress Note    Patient: Jocelin Cuellar MRN: [id number]  SSN: [id number]    YOB: 1960  Age: 66 y.o. Sex: female      Admit Date: 5/18/2019    LOS: 5 days     Subjective:     67 yo WF with non-STEMI, mild CAD, stress cardiomyopathy in the pattern of Takotsubo. Developed declining hemodynamic function 5/21/19 and required increased vasopressor support and emergent impella by CT surgery. Pt now off vasopressin ,paresh. Remains on cardene. She says she feels better today. SOB much improved, now off O2. Denies chest pain. Has occasional mild palpitations. Objective:     Vitals:    05/23/19 0600 05/23/19 0700 05/23/19 0800 05/23/19 0900   BP:       Pulse: 82 89 89 87   Resp: 12 14 19 20   Temp:   98.2 °F (36.8 °C)    SpO2: 93% 93% 96% 94%   Weight:       Height:          Cardiac index 3  Intake and Output:  Current Shift: 05/23 0701 - 05/23 1900  In: -   Out: 380 [Urine:380]  Last three shifts: 05/21 1901 - 05/23 0700  In: 8257.7 [P.O.:1440; I.V.:6817.7]  Out: 5970 [Urine:5970]    Physical Exam:   General:  Alert, cooperative, no distress, appears older than stated age. Sitting up in chair smiling   Eyes:  Conjunctivae/corneas clear. Pupils equal   Ears:  Hearing normal   Nose: Nares normal. Septum midline. Mucosa normal. No drainage   Mouth/Throat: Lips, mucosa, normal.   Neck: Supple, symmetrical, trachea midline,  Rt IJ swan in place. no JVD. Back:   Symmetric, no curvature. Lungs:   Diminished to auscultation bilateral bases. no wheeze, rales   Heart:  Regular rate and rhythm, S1, S2 normal, no murmur, click, rub or gallop. Abdomen:   Soft, non-distended, nontender. Bowel sounds normal. No masses,  No organomegaly. Extremities: Extremities normal, atraumatic, no cyanosis or edema. Pulses: 1+ and symmetric all extremities. Skin: Skin color, texture, turgor normal. No rashes or lesions       Neurologic: CNII-XII grossly intact.  CHRISTOPHER,        Lab/Data Review:  BMP:   Lab Results Component Value Date/Time     05/23/2019 03:24 AM    K 3.2 (L) 05/23/2019 03:24 AM     05/23/2019 03:24 AM    CO2 28 05/23/2019 03:24 AM    AGAP 5 05/23/2019 03:24 AM     (H) 05/23/2019 03:24 AM    BUN 12 05/23/2019 03:24 AM    CREA 0.83 05/23/2019 03:24 AM    GFRAA >60 05/23/2019 03:24 AM    GFRNA >60 05/23/2019 03:24 AM     CMP:   Lab Results   Component Value Date/Time     05/23/2019 03:24 AM    K 3.2 (L) 05/23/2019 03:24 AM     05/23/2019 03:24 AM    CO2 28 05/23/2019 03:24 AM    AGAP 5 05/23/2019 03:24 AM     (H) 05/23/2019 03:24 AM    BUN 12 05/23/2019 03:24 AM    CREA 0.83 05/23/2019 03:24 AM    GFRAA >60 05/23/2019 03:24 AM    GFRNA >60 05/23/2019 03:24 AM    CA 8.3 (L) 05/23/2019 03:24 AM    ALB 2.4 (L) 05/23/2019 12:41 AM    TP 5.7 (L) 05/23/2019 12:41 AM    GLOB 3.3 05/23/2019 12:41 AM    AGRAT 0.7 (L) 05/23/2019 12:41 AM    SGOT 37 05/23/2019 12:41 AM    ALT 17 05/23/2019 12:41 AM         Assessment:     Active Problems:    [facility] (5[facility]18/2019)      Chest pain (5/18/2019)      Overview: Added automatically from request for surgery [id number]      CHF (congestive heart failure), NYHA class IV, acute on chronic, systolic ([facility] (2/59/0773)      Overview: Added automatically from request for surgery [id number]      Abnormal TSH/Free T3  Nonobstructive, mild CAD:   Left Anterior Descending   Ost LAD to Prox LAD lesion 20% stenosed. .   Prox LAD lesion 30% stenosed. .   Mid LAD lesion 60% stenosed. .   Left Circumflex   Prox Cx to Mid Cx lesion 30% stenosed. .   Right Coronary Artery   Prox RCA to Mid RCA lesion 30% stenosed. Marilou Johnson NSVT    Plan:     1. Stress cardiomyopathy/Acute HFrEF:  EF 20-25%   -AHF following. NYHA IV on admission   -Impella placed by CT surgery for cardiogenic shock/ hemodyanamic decline 5/21.-management per CT surgery/AHF- plan to wean   -CI now >2.  Hemodynamics normalized with impella, off vasopresors, Currently on cardene at 5 mg/hr   -Aldactone 12.5 mg BID. Digoxin 0.125 mg added today per [facility]   -Pro BNP trend up 9414    -Net +2.6 L/24 hours. Lungs clear, no edema. No diuretics (except aldactone)    2. CAD, mild: (see above)   -ASA. Start statin. HDL 46, LDL 77.6    3. Endocrine derangement:   -Endocrine following   -Hypothyroid, possible hypopituitarism (unlikely per endocrine), DM (A1C=6.4)    4. Tobacco abuse:    -Discussed/encouraged smoking cessation. 5. NSVT: 4-5 runs of NSVT, (5-7 beats)   -Had 1 brief run early this a.m.   -Replete K and check magnesium    6. Hx of cocaine use   -UDS + for cocaine on admission   -was counseled on cessation/avoidance of illicit drugs. 7.1 Brief run of PAF on tele review (<1 min)   -Dig started by [facility] for #1. Will watch for now. Replete K and Mag. As above    8. Hypokalemia/hypomagnesemia:K=3.2, mg=1.5   -Repletion ordered      66 y.o. With stress cardiomyopathy who had hemodynamic decline 5/21/19 requiring vasosupport and emergent impella. Now hemodynamic status improved with impella;off vasopressors. On cardene per CT surgery. Pt feels better today, SOB much improved. [facility]/CT surgery following. Signed By: Shelby Baker.  INDIRA Ibanez     May 23, 2019
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
## 2019-05-23 NOTE — PROGRESS NOTES
[facility] ICU Progress Note    Admit Date: 2019  POD:  2 Day Post-Op    Procedure:  Procedure(s):  IMPELLA INSERTION RIGHT SUBCLAVIAN . TRANSESOPHAGEAL ECHOCARDIOGRAM BY DR. Jorgito Bowles. Subjective:   Pt seen with Dr. Rosa Santiago. Tmax 98.9, on RA. On cardene 5, insulin gtt. Bival through impella. Up in chair. Impella P7. No complaints. Objective:   Vitals:  Blood pressure 126/81, pulse 87, temperature 98.2 °F (36.8 °C), resp. rate 20, height 5' 4\" (1.626 m), weight 126 lb (57.2 kg), last menstrual period 2011, SpO2 94 %. Temp (24hrs), Av.2 °F (36.8 °C), Min:97.3 °F (36.3 °C), Max:98.9 °F (37.2 °C)    Hemodynamics:   CO: CO (l/min): 4 l/min   CI: CI (l/min/m2): 3 l/min/m2   CVP: CVP (mmHg): 4 mmHg (19)   SVR: SVR (dyne*sec)/cm5: 1383 (dyne*sec)/cm5 (19 4563)   PAP Systolic: PAP Systolic: 32 ( 9466)   PAP Diastolic: PAP Diastolic: 17 ( 2142)   PVR:     SV02: SVO2 (%): 76 % (19)   SCV02:      EKG/Rhythm:  NSR 80s    Extubation Date / Time: 19    Oxygen Therapy:  Oxygen Therapy  O2 Sat (%): 94 % (19)  Pulse via Oximetry: 70 beats per minute (19)  O2 Device: Room air (19 08)  O2 Flow Rate (L/min): 2 l/min (19 0200)  O2 Temperature: 39.2 °F (4 °C) (19 06)  FIO2 (%): 40 % (19 191)    CXR:   CXR Results  (Last 48 hours)               19 0452  XR CHEST PORT Final result    Impression:  No interval change. Narrative:  PORTABLE CHEST RADIOGRAPH/S: 2019 4:52 AM       Clinical history: Cardiac assist device placement   INDICATION:   PAC placement   COMPARISON: 2019       FINDINGS:   AP portable upright view of the chest demonstrates stable  cardiopericardial   silhouette. The lungs are adequately expanded. Minimal bibasilar atelectasis   and small bilateral pleural effusions unchanged. Pulmonary artery catheter   unchanged cardiac assist device unchanged in position. ..  The osseous structures   are unremarkable. Patient is on a cardiac monitor. 05/22/19 0431  XR CHEST PORT Final result    Impression:  1. Small bilateral pleural effusions with bibasilar atelectasis is present. This   has slightly improved in the interval.       Narrative:  EXAM: XR CHEST PORT       INDICATION: PAC placement       COMPARISON: 5/21/2019       FINDINGS: A portable AP radiograph of the chest was obtained at 0340 hours. The   patient is on a cardiac monitor. The ET tube and NG tube have been removed. [place]-Krishan catheter and cardiac assist device are in satisfactory position. Small bilateral pleural effusions and bibasilar atelectasis are present. Overall   aeration has improved at the lung bases. Mild edema-like pattern is unchanged. 05/21/19 1735  XR CHEST PORT Final result    Impression:  Impression:       1. Lines and tubes as above. 2. Increased small bilateral pleural effusions and mild edema       Narrative:  Chest portable AP       History: Impella/ETT placement       Comparison: 5/18/2018       Findings:  Endotracheal tube is in appropriate position approximately 5.5 cm   above the mary. A right IJ [place]-Krishan catheter terminates in the right   pulmonary artery. A right subclavian patella device is present. A nasogastric   tube terminates off the edge of film. There is obscuration of both   hemidiaphragms with haziness likely related to small bilateral pleural effusions   which are new. There is mild increased edema. No pneumothorax.                  Admission Weight: Last Weight   Weight: 125 lb (56.7 kg) Weight: 126 lb (57.2 kg)     Intake / Output / Drain:  Current Shift: 05/23 0701 - 05/23 1900  In: -   Out: 380 [Urine:380]  Last 24 hrs.:     Intake/Output Summary (Last 24 hours) at 5/23/2019 0918  Last data filed at 5/23/2019 0900  Gross per 24 hour   Intake 5415.81 ml   Output 3500 ml   Net 1915.81 ml       EXAM:  General:  NAD, alert Lungs:   Clear to auscultation bilaterally. Incision:  dsg cdi   Heart:  Regular rate and rhythm, S1, S2 normal, no murmur, click, rub or gallop. Abdomen:   Soft, non-tender. Bowel sounds normal. No masses,  No organomegaly. Extremities:  No edema. PPP. Neurologic:  Gross motor and sensory apparatus intact. Labs:   Recent Labs     19  0814  19  0324  19  0351   WBC  --   --  14.6*  --  9.6   HGB  --   --  9.5*  --  11.2*   HCT  --   --  29.3*  --  34.2*   PLT  --   --  227  --  293   NA  --   --  139   < > 139   K  --   --  3.2*   < > 3.5   BUN  --   --  12   < > 12   CREA  --   --  0.83   < > 1.02   GLU  --   --  118*   < > 57*   GLUCPOC 92   < >  --    < >  --    INR  --   --   --   --  1.1    < > = values in this interval not displayed. Assessment:     Active Problems:    NSTEMI (non-ST elevated myocardial infarction) ([facility] [address] (2019)      Chest pain (2019)      Overview: Added automatically from request for surgery [id number]      CHF (congestive heart failure), NYHA class IV, acute on chronic, systolic ([facility] [address] (3/70/7556)      Overview: Added automatically from request for surgery [id number]         Plan/Recommendations/Medical Decision Makin. Acute systolic heart failure (NYHA class IV on admission): Impella placed . Impella increased to P8 by Lisbet this morning. On ancef for impella ppx. Cont bival through purge.  -trending up, lactate 1.4. NT proBNP 9400-trending up. AHF following. On dig, aldactone. 2. Atelectasis: Encourage I/S, wean O2 for sats >92%  3. 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 4. Hx arthritis, fibromyalgia, chronic pain, DJD  5. GERD: on protonix  6. Hx stroke: pt reports stoke int he past, no deficits  7. COPD: Current smoker, smoking cessation education.  Wean O2 for sats >92%, cont nebs, prn duoneb. 8. Anemia: receiving IV iron, monitor.   8. Dispo: Cont ICU care, cont impella support    Signed By: Karma Snellen, NP

## 2019-05-23 NOTE — CONSULTS
After a conversation with Dr Leticia Pace, we have agreed that the patient will stay on the insulin infusion and current hydrocortisone dosing until; removal of the Impella. At that point we will taper the hydrocortsione and transition to subQ insulin (again she will need > 20 units daily). I will continue to follow closely and make recommendations as indicated.

## 2019-05-23 NOTE — PROGRESS NOTES
2000: Received report from Wendi Mckeon RN. Richa dual verified. 0000: Patient having increased ectopy, a few runs of nonsustained VTach, electrolyte panel sent. Patient asymptomatic, BP stable, pt sleeping. 0130: K: 4, will recheck with AM labs    0400: K:3.2, giving 3 runs of K to replace. Ectopy slowed down. 0800: Bedside and Verbal shift change report given to Guillermo Viramontes (oncoming nurse) by Colleen Mora (offgoing nurse). Report included the following information SBAR, Kardex, ED Summary, Procedure Summary, Intake/Output, MAR, Recent Results and Med Rec StatusSinus rhythm with PVC's . Problem: Cath Lab Procedures: Post-Cath Day 1  Goal: Activity/Safety  Outcome: Progressing Towards Goal  Goal: Diagnostic Test/Procedures  Outcome: Progressing Towards Goal  Goal: Nutrition/Diet  Outcome: Progressing Towards Goal  Goal: Medications  Outcome: Progressing Towards Goal  Goal: Respiratory  Outcome: Progressing Towards Goal  Goal: Psychosocial  Outcome: Progressing Towards Goal     Problem: Unstable angina/NSTEMI: Day 2  Goal: Activity/Safety  Outcome: Progressing Towards Goal  Goal: Consults, if ordered  Outcome: Progressing Towards Goal  Goal: Diagnostic Test/Procedures  Outcome: Progressing Towards Goal  Goal: Nutrition/Diet  Outcome: Progressing Towards Goal  Goal: Medications  Outcome: Progressing Towards Goal  Goal: Respiratory  Outcome: Progressing Towards Goal  Goal: *Hemodynamically stable  Outcome: Progressing Towards Goal  Goal: *Lungs clear or at baseline  Outcome: Progressing Towards Goal     Problem: Falls - Risk of  Goal: *Absence of Falls  Description  Document Bebeto Call Fall Risk and appropriate interventions in the flowsheet. Outcome: Progressing Towards Goal     Problem: Pressure Injury - Risk of  Goal: *Prevention of pressure injury  Description  Document Salvatore Scale and appropriate interventions in the flowsheet.   Outcome: Progressing Towards Goal

## 2019-05-23 NOTE — CONSULTS
Events noted. Insulin infusion currently off and pt received 7 units glargine insulin. Based on the total insulin infusion of ~ 72 units and continued hydrocortisone dosing I would suspect the patient will need a larger dose of basal insulin. Based on her weight she will need at least 20 units daily.

## 2019-05-23 NOTE — PROGRESS NOTES
Spiritual Care Assessment/Progress Note  [facility]      NAME: Jerome Freed      MRN: [id number]  AGE: 66 y.o.  SEX: female  Restorationism Affiliation: Confucianism   Language: English     5/23/2019     Total Time (in minutes): 8     Spiritual Assessment begun in [facility] CV INTNSV CARE through conversation with:         [x]Patient        [] Family    [] Friend(s)        Reason for Consult: Initial/Spiritual assessment, patient floor     Spiritual beliefs: (Please include comment if needed)     [x] Identifies with a gigi tradition:         [] Supported by a gigi community:            [] Claims no spiritual orientation:           [] Seeking spiritual identity:                [] Adheres to an individual form of spirituality:           [] Not able to assess:                           Identified resources for coping:      [] Prayer                               [] Music                  [] Guided Imagery     [] Family/friends                 [] Pet visits     [] Devotional reading                         [x] Unknown     [] Other:                                               Interventions offered during this visit: (See comments for more details)    Patient Interventions: Affirmation of gigi, Affirmation of emotions/emotional suffering, Catharsis/review of pertinent events in supportive environment, Coping skills reviewed/reinforced, Iconic (affirming the presence of God/Higher Power)           Plan of Care:     [] Support spiritual and/or cultural needs    [] Support AMD and/or advance care planning process      [] Support grieving process   [] Coordinate Rites and/or Rituals    [] Coordination with community clergy   [] No spiritual needs identified at this time   [] Detailed Plan of Care below (See Comments)  [] Make referral to Music Therapy  [] Make referral to Pet Therapy     [] Make referral to Addiction services  [] Make referral to [facility]  [] Make referral to Spiritual Care Partner  [] No future visits requested        [x] Follow up visits as needed     Comments:  visit for initial spiritual assessment. Patient sitting in chair at bedside. Good eye contact, smiling, friendly. Says she is feeling much better than she did when she arrived. Provided spiritual presence and listening as she spoke of her current thoughts, feelings, and concerns. Spoke about the events leading to this hospitalization and the impact it has had on her life. Described an active gigi and trust in God. Lives at home with her son and her son's father who provide good family support. Appeared comforted and encouraged as a result of this visit and expressed gratitude for this visit. Visited by Rev. Kush Moya, [address] paging service: [phone])

## 2019-05-23 NOTE — PROGRESS NOTES
NYHA class IV acute systolic heart failure  Cardiogenic shock     Mild dyspnea, fatigue, and weakness    Sx's improving    Working up endocrine issues    Hgb and platelets look good    PTT therapeutic    Creatinine normal    Bilirubin and other LFTs look good    LDH and lactic acid look good    NT pro-BNP a bit elevated    Addendum     Discussed with HF team    They want to try and wean / test Impella spin down      Impella - flow 3.5 L/min @ P-8    Risk of morbidity and mortality - high  Medical decision making - high complexity    Total critical care time - 30 minutes (CPT 06066)

## 2019-05-23 NOTE — PROGRESS NOTES
0800:  Bedside and Verbal shift change report given to SCARLETT RN by MARISELA RN. Report included the following information SBAR, Kardex, Intake/Output, MAR, Recent Results and Cardiac Rhythm NSR with PVCs. 0830:  1 tab percocet given for R shoulder pain. 0900:  Heart failure team rounding. Plans to decrease impella to P6 today, echo later this afternoon. Remove santiago, urinalysis later. 12 lead EKG and labs. 1020:  Transitioning off of insulin drip.  7 units lantus ordered. 1100: Santiago d/c'd.  12 lead EKG done. Labs sent    1600:  Labs drawn and sent. [employer] turned back on per endocrinology. 2000:  Bedside and Verbal shift change report given to MARISELA RN (oncoming nurse) by Al Palma RN (offgoing nurse). Report included the following information SBAR, Kardex, Intake/Output, MAR, Recent Results and Cardiac Rhythm NSR with PVCs.

## 2019-05-24 ENCOUNTER — APPOINTMENT (OUTPATIENT)
Dept: GENERAL RADIOLOGY | Age: 59
DRG: 215 | End: 2019-05-24
Attending: NURSE PRACTITIONER
Payer: MEDICARE

## 2019-05-24 ENCOUNTER — APPOINTMENT (OUTPATIENT)
Dept: NON INVASIVE DIAGNOSTICS | Age: 59
DRG: 215 | End: 2019-05-24
Attending: INTERNAL MEDICINE
Payer: MEDICARE

## 2019-05-24 ENCOUNTER — APPOINTMENT (OUTPATIENT)
Dept: NON INVASIVE DIAGNOSTICS | Age: 59
DRG: 215 | End: 2019-05-24
Payer: MEDICARE

## 2019-05-24 ENCOUNTER — ANESTHESIA EVENT (OUTPATIENT)
Dept: CARDIOTHORACIC SURGERY | Age: 59
DRG: 215 | End: 2019-05-24
Payer: MEDICARE

## 2019-05-24 LAB
ABO + RH BLD: NORMAL
ADMINISTERED INITIALS, ADMINIT: NORMAL
ALBUMIN SERPL-MCNC: 2.4 G/DL (ref 3.5–5)
ALBUMIN/GLOB SERPL: 0.7 {RATIO} (ref 1.1–2.2)
ALP SERPL-CCNC: 106 U/L (ref 45–117)
ALT SERPL-CCNC: 15 U/L (ref 12–78)
ANION GAP SERPL CALC-SCNC: 7 MMOL/L (ref 5–15)
APTT PPP: 58.4 SEC (ref 22.1–32)
AST SERPL-CCNC: 35 U/L (ref 15–37)
ATRIAL RATE: 69 BPM
ATRIAL RATE: 91 BPM
BASOPHILS # BLD: 0 K/UL (ref 0–0.1)
BASOPHILS NFR BLD: 0 % (ref 0–1)
BILIRUB SERPL-MCNC: 0.2 MG/DL (ref 0.2–1)
BLOOD GROUP ANTIBODIES SERPL: NORMAL
BNP SERPL-MCNC: 8918 PG/ML
BUN SERPL-MCNC: 12 MG/DL (ref 6–20)
BUN/CREAT SERPL: 15 (ref 12–20)
CALCIUM SERPL-MCNC: 8.3 MG/DL (ref 8.5–10.1)
CALCULATED P AXIS, ECG09: 55 DEGREES
CALCULATED P AXIS, ECG09: 78 DEGREES
CALCULATED R AXIS, ECG10: -61 DEGREES
CALCULATED R AXIS, ECG10: -68 DEGREES
CALCULATED T AXIS, ECG11: 155 DEGREES
CALCULATED T AXIS, ECG11: 61 DEGREES
CHLORIDE SERPL-SCNC: 107 MMOL/L (ref 97–108)
CO2 SERPL-SCNC: 27 MMOL/L (ref 21–32)
CREAT SERPL-MCNC: 0.78 MG/DL (ref 0.55–1.02)
D50 ADMINISTERED, D50ADM: 0 ML
D50 ADMINISTERED, D50ADM: 10 ML
D50 ORDER, D50ORD: 0 ML
D50 ORDER, D50ORD: 10 ML
DIAGNOSIS, 93000: NORMAL
DIAGNOSIS, 93000: NORMAL
DIFFERENTIAL METHOD BLD: ABNORMAL
DIGOXIN SERPL-MCNC: 0.3 NG/ML (ref 0.9–2)
ECHO LV EDV A2C: 54.5 ML
ECHO LV EDV A4C: 50.5 ML
ECHO LV EDV BP: 52.4 ML (ref 56–104)
ECHO LV EDV INDEX A4C: 31.5 ML/M2
ECHO LV EDV INDEX BP: 32.7 ML/M2
ECHO LV EDV NDEX A2C: 34 ML/M2
ECHO LV EJECTION FRACTION A2C: 36 %
ECHO LV EJECTION FRACTION A4C: 56 %
ECHO LV EJECTION FRACTION BIPLANE: 46.9 % (ref 55–100)
ECHO LV ESV A2C: 34.8 ML
ECHO LV ESV A4C: 22.4 ML
ECHO LV ESV BP: 27.8 ML (ref 19–49)
ECHO LV ESV INDEX A2C: 21.7 ML/M2
ECHO LV ESV INDEX A4C: 14 ML/M2
ECHO LV ESV INDEX BP: 17.4 ML/M2
ECHO LV INTERNAL DIMENSION DIASTOLIC: 4.56 CM (ref 3.9–5.3)
ECHO LV INTERNAL DIMENSION SYSTOLIC: 3.38 CM
ECHO LV IVSD: 0.88 CM (ref 0.6–0.9)
ECHO LV MASS 2D: 153.4 G (ref 67–162)
ECHO LV MASS INDEX 2D: 95.8 G/M2 (ref 43–95)
ECHO LV POSTERIOR WALL DIASTOLIC: 0.91 CM (ref 0.6–0.9)
EOSINOPHIL # BLD: 0 K/UL (ref 0–0.4)
EOSINOPHIL NFR BLD: 0 % (ref 0–7)
ERYTHROCYTE [DISTWIDTH] IN BLOOD BY AUTOMATED COUNT: 13.4 % (ref 11.5–14.5)
GLOBULIN SER CALC-MCNC: 3.4 G/DL (ref 2–4)
GLSCOM COMMENTS: NORMAL
GLUCOSE BLD STRIP.AUTO-MCNC: 100 MG/DL (ref 65–100)
GLUCOSE BLD STRIP.AUTO-MCNC: 102 MG/DL (ref 65–100)
GLUCOSE BLD STRIP.AUTO-MCNC: 108 MG/DL (ref 65–100)
GLUCOSE BLD STRIP.AUTO-MCNC: 109 MG/DL (ref 65–100)
GLUCOSE BLD STRIP.AUTO-MCNC: 112 MG/DL (ref 65–100)
GLUCOSE BLD STRIP.AUTO-MCNC: 121 MG/DL (ref 65–100)
GLUCOSE BLD STRIP.AUTO-MCNC: 123 MG/DL (ref 65–100)
GLUCOSE BLD STRIP.AUTO-MCNC: 134 MG/DL (ref 65–100)
GLUCOSE BLD STRIP.AUTO-MCNC: 135 MG/DL (ref 65–100)
GLUCOSE BLD STRIP.AUTO-MCNC: 146 MG/DL (ref 65–100)
GLUCOSE BLD STRIP.AUTO-MCNC: 147 MG/DL (ref 65–100)
GLUCOSE BLD STRIP.AUTO-MCNC: 157 MG/DL (ref 65–100)
GLUCOSE BLD STRIP.AUTO-MCNC: 190 MG/DL (ref 65–100)
GLUCOSE BLD STRIP.AUTO-MCNC: 209 MG/DL (ref 65–100)
GLUCOSE BLD STRIP.AUTO-MCNC: 227 MG/DL (ref 65–100)
GLUCOSE BLD STRIP.AUTO-MCNC: 240 MG/DL (ref 65–100)
GLUCOSE BLD STRIP.AUTO-MCNC: 76 MG/DL (ref 65–100)
GLUCOSE BLD STRIP.AUTO-MCNC: 82 MG/DL (ref 65–100)
GLUCOSE BLD STRIP.AUTO-MCNC: 96 MG/DL (ref 65–100)
GLUCOSE BLD STRIP.AUTO-MCNC: 98 MG/DL (ref 65–100)
GLUCOSE BLD STRIP.AUTO-MCNC: 98 MG/DL (ref 65–100)
GLUCOSE BLD STRIP.AUTO-MCNC: 99 MG/DL (ref 65–100)
GLUCOSE SERPL-MCNC: 105 MG/DL (ref 65–100)
GLUCOSE, GLC: 100 MG/DL
GLUCOSE, GLC: 102 MG/DL
GLUCOSE, GLC: 108 MG/DL
GLUCOSE, GLC: 109 MG/DL
GLUCOSE, GLC: 112 MG/DL
GLUCOSE, GLC: 121 MG/DL
GLUCOSE, GLC: 125 MG/DL
GLUCOSE, GLC: 134 MG/DL
GLUCOSE, GLC: 135 MG/DL
GLUCOSE, GLC: 146 MG/DL
GLUCOSE, GLC: 147 MG/DL
GLUCOSE, GLC: 157 MG/DL
GLUCOSE, GLC: 190 MG/DL
GLUCOSE, GLC: 209 MG/DL
GLUCOSE, GLC: 227 MG/DL
GLUCOSE, GLC: 240 MG/DL
GLUCOSE, GLC: 76 MG/DL
GLUCOSE, GLC: 82 MG/DL
GLUCOSE, GLC: 96 MG/DL
GLUCOSE, GLC: 98 MG/DL
GLUCOSE, GLC: 98 MG/DL
GLUCOSE, GLC: 99 MG/DL
HCT VFR BLD AUTO: 29.3 % (ref 35–47)
HGB BLD-MCNC: 9.4 G/DL (ref 11.5–16)
HGB FREE PLAS-MCNC: 4.3 MG/DL (ref 0–4.9)
HIGH TARGET, HITG: 140 MG/DL
IMM GRANULOCYTES # BLD AUTO: 0.2 K/UL (ref 0–0.04)
IMM GRANULOCYTES NFR BLD AUTO: 2 % (ref 0–0.5)
INR PPP: 1.4 (ref 0.9–1.1)
INSULIN ADMINSTERED, INSADM: 0 UNITS/HOUR
INSULIN ADMINSTERED, INSADM: 0.2 UNITS/HOUR
INSULIN ADMINSTERED, INSADM: 0.2 UNITS/HOUR
INSULIN ADMINSTERED, INSADM: 0.3 UNITS/HOUR
INSULIN ADMINSTERED, INSADM: 0.5 UNITS/HOUR
INSULIN ADMINSTERED, INSADM: 0.9 UNITS/HOUR
INSULIN ADMINSTERED, INSADM: 0.9 UNITS/HOUR
INSULIN ADMINSTERED, INSADM: 1.2 UNITS/HOUR
INSULIN ADMINSTERED, INSADM: 1.3 UNITS/HOUR
INSULIN ADMINSTERED, INSADM: 1.4 UNITS/HOUR
INSULIN ADMINSTERED, INSADM: 1.6 UNITS/HOUR
INSULIN ADMINSTERED, INSADM: 2.3 UNITS/HOUR
INSULIN ADMINSTERED, INSADM: 3.3 UNITS/HOUR
INSULIN ADMINSTERED, INSADM: 3.8 UNITS/HOUR
INSULIN ADMINSTERED, INSADM: 3.9 UNITS/HOUR
INSULIN ADMINSTERED, INSADM: 4.1 UNITS/HOUR
INSULIN ADMINSTERED, INSADM: 4.3 UNITS/HOUR
INSULIN ADMINSTERED, INSADM: 4.7 UNITS/HOUR
INSULIN ADMINSTERED, INSADM: 6.4 UNITS/HOUR
INSULIN ADMINSTERED, INSADM: 6.9 UNITS/HOUR
INSULIN ORDER, INSORD: 0 UNITS/HOUR
INSULIN ORDER, INSORD: 0.2 UNITS/HOUR
INSULIN ORDER, INSORD: 0.2 UNITS/HOUR
INSULIN ORDER, INSORD: 0.3 UNITS/HOUR
INSULIN ORDER, INSORD: 0.5 UNITS/HOUR
INSULIN ORDER, INSORD: 0.9 UNITS/HOUR
INSULIN ORDER, INSORD: 0.9 UNITS/HOUR
INSULIN ORDER, INSORD: 1.2 UNITS/HOUR
INSULIN ORDER, INSORD: 1.3 UNITS/HOUR
INSULIN ORDER, INSORD: 1.4 UNITS/HOUR
INSULIN ORDER, INSORD: 1.6 UNITS/HOUR
INSULIN ORDER, INSORD: 2.3 UNITS/HOUR
INSULIN ORDER, INSORD: 3.3 UNITS/HOUR
INSULIN ORDER, INSORD: 3.8 UNITS/HOUR
INSULIN ORDER, INSORD: 3.9 UNITS/HOUR
INSULIN ORDER, INSORD: 4.1 UNITS/HOUR
INSULIN ORDER, INSORD: 4.3 UNITS/HOUR
INSULIN ORDER, INSORD: 4.7 UNITS/HOUR
INSULIN ORDER, INSORD: 6.4 UNITS/HOUR
INSULIN ORDER, INSORD: 6.9 UNITS/HOUR
LACTATE SERPL-SCNC: 0.6 MMOL/L (ref 0.4–2)
LDH SERPL L TO P-CCNC: 551 U/L (ref 81–246)
LOW TARGET, LOT: 100 MG/DL
LYMPHOCYTES # BLD: 1.3 K/UL (ref 0.8–3.5)
LYMPHOCYTES NFR BLD: 9 % (ref 12–49)
MAGNESIUM SERPL-MCNC: 1.9 MG/DL (ref 1.6–2.4)
MCH RBC QN AUTO: 28.3 PG (ref 26–34)
MCHC RBC AUTO-ENTMCNC: 32.1 G/DL (ref 30–36.5)
MCV RBC AUTO: 88.3 FL (ref 80–99)
MINUTES UNTIL NEXT BG, NBG: 15 MIN
MINUTES UNTIL NEXT BG, NBG: 60 MIN
MONOCYTES # BLD: 0.8 K/UL (ref 0–1)
MONOCYTES NFR BLD: 5 % (ref 5–13)
MULTIPLIER, MUL: 0
MULTIPLIER, MUL: 0.01
MULTIPLIER, MUL: 0.01
MULTIPLIER, MUL: 0.02
MULTIPLIER, MUL: 0.03
MULTIPLIER, MUL: 0.04
MULTIPLIER, MUL: 0.05
NEUTS SEG # BLD: 12.2 K/UL (ref 1.8–8)
NEUTS SEG NFR BLD: 84 % (ref 32–75)
NRBC # BLD: 0 K/UL (ref 0–0.01)
NRBC BLD-RTO: 0 PER 100 WBC
ORDER INITIALS, ORDINIT: NORMAL
P-R INTERVAL, ECG05: 112 MS
P-R INTERVAL, ECG05: 112 MS
PLATELET # BLD AUTO: 206 K/UL (ref 150–400)
PMV BLD AUTO: 9.3 FL (ref 8.9–12.9)
POTASSIUM SERPL-SCNC: 3.9 MMOL/L (ref 3.5–5.1)
PROCALCITONIN SERPL-MCNC: <0.1 NG/ML
PROT SERPL-MCNC: 5.8 G/DL (ref 6.4–8.2)
PROTHROMBIN TIME: 13.6 SEC (ref 9–11.1)
Q-T INTERVAL, ECG07: 348 MS
Q-T INTERVAL, ECG07: 430 MS
QRS DURATION, ECG06: 74 MS
QRS DURATION, ECG06: 76 MS
QTC CALCULATION (BEZET), ECG08: 428 MS
QTC CALCULATION (BEZET), ECG08: 460 MS
RBC # BLD AUTO: 3.32 M/UL (ref 3.8–5.2)
SERVICE CMNT-IMP: ABNORMAL
SERVICE CMNT-IMP: NORMAL
SODIUM SERPL-SCNC: 141 MMOL/L (ref 136–145)
SPECIMEN EXP DATE BLD: NORMAL
T4 FREE SERPL-MCNC: 1.1 NG/DL (ref 0.8–1.5)
T4 FREE SERPL-MCNC: 1.1 NG/DL (ref 0.8–1.5)
THERAPEUTIC RANGE,PTTT: ABNORMAL SECS (ref 58–77)
VENTRICULAR RATE, ECG03: 69 BPM
VENTRICULAR RATE, ECG03: 91 BPM
WBC # BLD AUTO: 14.5 K/UL (ref 3.6–11)

## 2019-05-24 PROCEDURE — 93306 TTE W/DOPPLER COMPLETE: CPT

## 2019-05-24 PROCEDURE — 93005 ELECTROCARDIOGRAM TRACING: CPT

## 2019-05-24 PROCEDURE — 82962 GLUCOSE BLOOD TEST: CPT

## 2019-05-24 PROCEDURE — 65610000003 HC RM ICU SURGICAL

## 2019-05-24 PROCEDURE — 80162 ASSAY OF DIGOXIN TOTAL: CPT

## 2019-05-24 PROCEDURE — 74011250637 HC RX REV CODE- 250/637: Performed by: NURSE PRACTITIONER

## 2019-05-24 PROCEDURE — 74011000258 HC RX REV CODE- 258: Performed by: NURSE PRACTITIONER

## 2019-05-24 PROCEDURE — 99291 CRITICAL CARE FIRST HOUR: CPT | Performed by: INTERNAL MEDICINE

## 2019-05-24 PROCEDURE — 36415 COLL VENOUS BLD VENIPUNCTURE: CPT

## 2019-05-24 PROCEDURE — 84439 ASSAY OF FREE THYROXINE: CPT

## 2019-05-24 PROCEDURE — 74011000258 HC RX REV CODE- 258: Performed by: THORACIC SURGERY (CARDIOTHORACIC VASCULAR SURGERY)

## 2019-05-24 PROCEDURE — 85025 COMPLETE CBC W/AUTO DIFF WBC: CPT

## 2019-05-24 PROCEDURE — 86900 BLOOD TYPING SEROLOGIC ABO: CPT

## 2019-05-24 PROCEDURE — 94640 AIRWAY INHALATION TREATMENT: CPT

## 2019-05-24 PROCEDURE — 80053 COMPREHEN METABOLIC PANEL: CPT

## 2019-05-24 PROCEDURE — 74011000250 HC RX REV CODE- 250: Performed by: NURSE PRACTITIONER

## 2019-05-24 PROCEDURE — 74011250636 HC RX REV CODE- 250/636: Performed by: NURSE PRACTITIONER

## 2019-05-24 PROCEDURE — 74011250637 HC RX REV CODE- 250/637: Performed by: INTERNAL MEDICINE

## 2019-05-24 PROCEDURE — 74011250636 HC RX REV CODE- 250/636: Performed by: THORACIC SURGERY (CARDIOTHORACIC VASCULAR SURGERY)

## 2019-05-24 PROCEDURE — 74011636637 HC RX REV CODE- 636/637: Performed by: THORACIC SURGERY (CARDIOTHORACIC VASCULAR SURGERY)

## 2019-05-24 PROCEDURE — 74011000250 HC RX REV CODE- 250: Performed by: HOSPITALIST

## 2019-05-24 PROCEDURE — 71045 X-RAY EXAM CHEST 1 VIEW: CPT

## 2019-05-24 PROCEDURE — 83615 LACTATE (LD) (LDH) ENZYME: CPT

## 2019-05-24 PROCEDURE — 85610 PROTHROMBIN TIME: CPT

## 2019-05-24 PROCEDURE — 85730 THROMBOPLASTIN TIME PARTIAL: CPT

## 2019-05-24 PROCEDURE — 83880 ASSAY OF NATRIURETIC PEPTIDE: CPT

## 2019-05-24 PROCEDURE — 84145 PROCALCITONIN (PCT): CPT

## 2019-05-24 PROCEDURE — 83735 ASSAY OF MAGNESIUM: CPT

## 2019-05-24 PROCEDURE — 74011250637 HC RX REV CODE- 250/637: Performed by: HOSPITALIST

## 2019-05-24 PROCEDURE — 83605 ASSAY OF LACTIC ACID: CPT

## 2019-05-24 PROCEDURE — 76450000000

## 2019-05-24 RX ORDER — PRAVASTATIN SODIUM 40 MG/1
40 TABLET ORAL
Status: DISCONTINUED | OUTPATIENT
Start: 2019-05-24 | End: 2019-06-04 | Stop reason: HOSPADM

## 2019-05-24 RX ORDER — DIGOXIN 250 MCG
0.25 TABLET ORAL DAILY
Status: DISCONTINUED | OUTPATIENT
Start: 2019-05-24 | End: 2019-05-29

## 2019-05-24 RX ORDER — HYDRALAZINE HYDROCHLORIDE 25 MG/1
25 TABLET, FILM COATED ORAL 3 TIMES DAILY
Status: DISCONTINUED | OUTPATIENT
Start: 2019-05-24 | End: 2019-05-31

## 2019-05-24 RX ORDER — HYDROCORTISONE SODIUM SUCCINATE 100 MG/2ML
50 INJECTION, POWDER, FOR SOLUTION INTRAMUSCULAR; INTRAVENOUS EVERY 8 HOURS
Status: DISCONTINUED | OUTPATIENT
Start: 2019-05-24 | End: 2019-05-26

## 2019-05-24 RX ORDER — ISOSORBIDE DINITRATE 20 MG/1
20 TABLET ORAL 3 TIMES DAILY
Status: DISCONTINUED | OUTPATIENT
Start: 2019-05-24 | End: 2019-05-29

## 2019-05-24 RX ORDER — PRAVASTATIN SODIUM 20 MG/1
20 TABLET ORAL
Status: DISCONTINUED | OUTPATIENT
Start: 2019-05-24 | End: 2019-05-24

## 2019-05-24 RX ADMIN — BUDESONIDE 250 MCG: 0.25 INHALANT RESPIRATORY (INHALATION) at 07:39

## 2019-05-24 RX ADMIN — DIGOXIN 0.25 MG: 250 TABLET ORAL at 10:04

## 2019-05-24 RX ADMIN — SODIUM CHLORIDE 5 MG/HR: 900 INJECTION, SOLUTION INTRAVENOUS at 02:35

## 2019-05-24 RX ADMIN — HYDRALAZINE HYDROCHLORIDE 25 MG: 25 TABLET, FILM COATED ORAL at 16:06

## 2019-05-24 RX ADMIN — Medication 2 G: at 23:39

## 2019-05-24 RX ADMIN — POTASSIUM CHLORIDE 40 MEQ: 750 TABLET, FILM COATED, EXTENDED RELEASE ORAL at 10:04

## 2019-05-24 RX ADMIN — Medication 10 ML: at 22:00

## 2019-05-24 RX ADMIN — PRAVASTATIN SODIUM 40 MG: 40 TABLET ORAL at 21:05

## 2019-05-24 RX ADMIN — GUAIFENESIN 600 MG: 600 TABLET, EXTENDED RELEASE ORAL at 21:04

## 2019-05-24 RX ADMIN — ISOSORBIDE DINITRATE 20 MG: 20 TABLET ORAL at 16:06

## 2019-05-24 RX ADMIN — POLYETHYLENE GLYCOL 3350 17 G: 17 POWDER, FOR SOLUTION ORAL at 10:04

## 2019-05-24 RX ADMIN — SENNOSIDES,DOCUSATE SODIUM 1 TABLET: 8.6; 5 TABLET, FILM COATED ORAL at 10:04

## 2019-05-24 RX ADMIN — POTASSIUM CHLORIDE 40 MEQ: 750 TABLET, FILM COATED, EXTENDED RELEASE ORAL at 21:05

## 2019-05-24 RX ADMIN — SODIUM CHLORIDE 3.9 UNITS/HR: 900 INJECTION, SOLUTION INTRAVENOUS at 21:45

## 2019-05-24 RX ADMIN — OXYCODONE HYDROCHLORIDE AND ACETAMINOPHEN 1 TABLET: 5; 325 TABLET ORAL at 20:50

## 2019-05-24 RX ADMIN — Medication 2 G: at 00:33

## 2019-05-24 RX ADMIN — HYDROCORTISONE SODIUM SUCCINATE 100 MG: 100 INJECTION, POWDER, FOR SOLUTION INTRAMUSCULAR; INTRAVENOUS at 04:00

## 2019-05-24 RX ADMIN — SODIUM CHLORIDE 4.7 UNITS/HR: 900 INJECTION, SOLUTION INTRAVENOUS at 15:22

## 2019-05-24 RX ADMIN — BUDESONIDE 250 MCG: 0.25 INHALANT RESPIRATORY (INHALATION) at 20:15

## 2019-05-24 RX ADMIN — BIVALIRUDIN 12.1 ML/HR: 250 INJECTION, POWDER, LYOPHILIZED, FOR SOLUTION INTRAVENOUS at 11:57

## 2019-05-24 RX ADMIN — SODIUM CHLORIDE 12 ML/HR: 900 INJECTION, SOLUTION INTRAVENOUS at 08:13

## 2019-05-24 RX ADMIN — POTASSIUM CHLORIDE 40 MEQ: 750 TABLET, FILM COATED, EXTENDED RELEASE ORAL at 16:06

## 2019-05-24 RX ADMIN — HYDROCORTISONE SODIUM SUCCINATE 50 MG: 100 INJECTION, POWDER, FOR SOLUTION INTRAMUSCULAR; INTRAVENOUS at 12:43

## 2019-05-24 RX ADMIN — ISOSORBIDE DINITRATE 20 MG: 20 TABLET ORAL at 21:05

## 2019-05-24 RX ADMIN — HYDROCORTISONE SODIUM SUCCINATE 50 MG: 100 INJECTION, POWDER, FOR SOLUTION INTRAMUSCULAR; INTRAVENOUS at 20:50

## 2019-05-24 RX ADMIN — Medication 10 ML: at 12:47

## 2019-05-24 RX ADMIN — HYDRALAZINE HYDROCHLORIDE 25 MG: 25 TABLET, FILM COATED ORAL at 12:43

## 2019-05-24 RX ADMIN — BIVALIRUDIN 12.5 ML/HR: 250 INJECTION, POWDER, LYOPHILIZED, FOR SOLUTION INTRAVENOUS at 15:22

## 2019-05-24 RX ADMIN — HYDRALAZINE HYDROCHLORIDE 25 MG: 25 TABLET, FILM COATED ORAL at 21:05

## 2019-05-24 RX ADMIN — Medication 2 G: at 16:07

## 2019-05-24 RX ADMIN — PANTOPRAZOLE SODIUM 40 MG: 40 TABLET, DELAYED RELEASE ORAL at 07:15

## 2019-05-24 RX ADMIN — Medication 2 G: at 07:15

## 2019-05-24 RX ADMIN — SPIRONOLACTONE 25 MG: 25 TABLET ORAL at 10:04

## 2019-05-24 RX ADMIN — OXYCODONE HYDROCHLORIDE AND ACETAMINOPHEN 1 TABLET: 5; 325 TABLET ORAL at 07:15

## 2019-05-24 RX ADMIN — SPIRONOLACTONE 25 MG: 25 TABLET ORAL at 18:01

## 2019-05-24 RX ADMIN — ISOSORBIDE DINITRATE 20 MG: 20 TABLET ORAL at 12:43

## 2019-05-24 RX ADMIN — ASPIRIN 81 MG: 81 TABLET ORAL at 10:04

## 2019-05-24 NOTE — OP NOTES
[address]   OPERATIVE REPORT    Name:  Hao Arevalo  MR#:  [id number]  :  1960  ACCOUNT #:  [de-identified]  DATE OF SERVICE:  2019    PREOPERATIVE DIAGNOSES:  1.  New York Heart Association class IV acute on chronic systolic heart failure. 2.  Cardiogenic shock. POSTOPERATIVE DIAGNOSES:  1.  New York Heart Association class IV acute on chronic systolic heart failure. 2.  Cardiogenic shock. PROCEDURES PERFORMED:  1. Right axillary artery exploration with construction of a 10 mm chimney graft used for introduction of a percutaneous left ventricular assist device (Impella 5.0; CPT code 00984). 2.  Insertion of percutaneous left ventricular assist device (Impella 5.0) through a right axillary artery chimney graft (CPT code 52773). SURGEON:  Janene Michelle MD    ASSISTANT:  Chick Kanner, PA    ANESTHESIA:  General endotracheal anesthesia. COMPLICATIONS:  None. SPECIMENS REMOVED:  None. IMPLANTS:  None. ESTIMATED BLOOD LOSS:  10 mL. PROCEDURE:  The patient is a very pleasant 61-year-old woman suffering from [address] class IV acute on chronic systolic heart failure and cardiogenic shock. She is now being brought to the operating room to have a right Impella placed. The patient was brought to the operating room and had a right radial A-line without complications. [place]-Krishan catheter was placed without complications, general endotracheal anesthesia without complications. Chest, abdomen, and lower extremities were prepped and draped in usual sterile fashion. A right infraclavicular incision was made. Cautery dissection was used to dissect down to the level of the right axillary artery. We then placed the clamps and opened the artery and performed a 10 mm Hemashield graft to right axillary artery anastomosis using a 5-0 Prolene suture.   We then released the clamps, accessed the left ventricular cavity using an AL2 catheter and J-wire, exchanged over to the Impella wire, brought the catheter out and brought the Impella in. We then removed the wire and turned the device on. Vicryl sutures were used to close the incision. There was good flow through the Impella at the end. I was present for the entire procedure.           MD SUPA [id number]  D:  05/24/2019 9:47  T:  05/24/2019 13:08  JOB #:  [id number]

## 2019-05-24 NOTE — PROGRESS NOTES
2000: Received report from Batsheva Gan RN. Drips dual verified, pt in bed on telephone. Remains on cardene and insulin gtt. 2015: Pt OOB to toilet, pt voided and passed a bowel movement, will collect stool sample when clean specimen available. 2230: Pt OOB to toilet, pt voided without difficulty. 0600: EKG obtained. Pt OOB to toilet, pt voided without difficulty. 0800: Bedside and Verbal shift change report given to [address] [place] [address]  (oncoming nurse) by Maryellen Araiza (offgoing nurse). Report included the following information SBAR, Kardex, ED Summary, OR Summary, Procedure Summary, Intake/Output, MAR, Cardiac Rhythm NSR and Alarm Parameters .

## 2019-05-24 NOTE — PROGRESS NOTES
[facility] ICU Progress Note    Admit Date: 2019  POD:  3 Day Post-Op    Procedure:  Procedure(s):  IMPELLA INSERTION RIGHT SUBCLAVIAN . TRANSESOPHAGEAL ECHOCARDIOGRAM BY DR. Nelson Murphy. Subjective:   Pt seen with Dr. Lester Maxwell. Tmax 98.2, on RA. On cardene 5, insulin gtt. Bival through impella. Up in chair. Impella P6. No complaints. Objective:   Vitals:  Blood pressure 126/81, pulse 98, temperature 97.9 °F (36.6 °C), resp. rate 23, height 5' 4\" (1.626 m), weight 125 lb 4.8 oz (56.8 kg), last menstrual period 2011, SpO2 96 %. Temp (24hrs), Av.8 °F (36.6 °C), Min:97.6 °F (36.4 °C), Max:97.9 °F (36.6 °C)    Hemodynamics:   CO: CO (l/min): 5.3 l/min   CI: CI (l/min/m2): 3.3 l/min/m2   CVP: CVP (mmHg): 2 mmHg (19 07)   SVR: SVR (dyne*sec)/cm5: 1215 (dyne*sec)/cm5 (19 7406)   PAP Systolic: PAP Systolic: 28 (59/06 0455)   PAP Diastolic: PAP Diastolic: 11 (32/36/06 1907)   PVR:     SV02: SVO2 (%): (SvO2 stopped working) (19 1600)   SCV02:      EKG/Rhythm:  NSR 80s    Extubation Date / Time: 19    Oxygen Therapy:  Oxygen Therapy  O2 Sat (%): 96 % (19)  Pulse via Oximetry: 86 beats per minute (19)  O2 Device: Room air (19)  O2 Flow Rate (L/min): 2 l/min (19 0200)  O2 Temperature: 39.2 °F (4 °C) (19 0600)  FIO2 (%): 40 % (19)    CXR:   CXR Results  (Last 48 hours)               19 0529  XR CHEST PORT Final result    Impression:  1. Improved aeration with decrease in pleural effusions. Narrative:  EXAM: XR CHEST PORT       INDICATION: [place]-Krishan catheter placement, cardiac assist device       COMPARISON: 2019       FINDINGS: A portable AP radiograph of the chest was obtained at 0356 hours. The   patient is on a cardiac monitor. The [place]-Krishan catheter and cardiac assist   device are in satisfactory position. Lungs demonstrate no pulmonary edema. There   is improved aeration.  There is minimal atelectasis in left lower lobe which has   decreased. Small pleural effusions have decreased. 05/23/19 0452  XR CHEST PORT Final result    Impression:  No interval change. Narrative:  PORTABLE CHEST RADIOGRAPH/S: 5/23/2019 4:52 AM       Clinical history: Cardiac assist device placement   INDICATION:   PAC placement   COMPARISON: 5/22/2019       FINDINGS:   AP portable upright view of the chest demonstrates stable  cardiopericardial   silhouette. The lungs are adequately expanded. Minimal bibasilar atelectasis   and small bilateral pleural effusions unchanged. Pulmonary artery catheter   unchanged cardiac assist device unchanged in position. .. The osseous structures   are unremarkable. Patient is on a cardiac monitor. Admission Weight: Last Weight   Weight: 125 lb (56.7 kg) Weight: 125 lb 4.8 oz (56.8 kg)     Intake / Output / Drain:  Current Shift: 05/24 0701 - 05/24 1900  In: 20 [I.V.:20]  Out: -   Last 24 hrs.:     Intake/Output Summary (Last 24 hours) at 5/24/2019 0801  Last data filed at 5/24/2019 0715  Gross per 24 hour   Intake 3068.76 ml   Output 3280 ml   Net -211.24 ml       EXAM:  General:  NAD, alert                                                                                           Lungs:   Clear to auscultation bilaterally. Incision:  dsg cdi   Heart:  Regular rate and rhythm, S1, S2 normal, no murmur, click, rub or gallop. Abdomen:   Soft, non-tender. Bowel sounds normal. No masses,  No organomegaly. Extremities:  No edema. PPP. Neurologic:  Gross motor and sensory apparatus intact. Labs:   Recent Labs     05/24/19  0652  05/24/19  0302   WBC  --   --  14.5*   HGB  --   --  9.4*   HCT  --   --  29.3*   PLT  --   --  206   NA  --   --  141   K  --   --  3.9   BUN  --   --  12   CREA  --   --  0.78   GLU  --   --  105*   GLUCPOC 98   < >  --    INR  --   --  1.4*    < > = values in this interval not displayed.         Assessment:     Active Problems: NSTEMI (non-ST elevated myocardial infarction) ([facility] [address] (2019)      Chest pain (2019)      Overview: Added automatically from request for surgery [id number]      CHF (congestive heart failure), NYHA class IV, acute on chronic, systolic ([facility] [address] (950)      Overview: Added automatically from request for surgery [id number]         Plan/Recommendations/Medical Decision Makin. Acute systolic heart failure (NYHA class IV on admission): Impella placed . Cont impella P6. On ancef for impella ppx. Cont bival through purge.  -improved some, lactate 0.6. NT proBNP 8900-improved some. AHF following. On dig, aldactone. Plan to wean impella 1 P level per day over the weekend -possibly remove impella on   2. Atelectasis: Encourage I/S, on RA. 3. Endocrinological derangement: On hydrocortisone -starting to wean dose per AHF/endocrine. Endocrine following. 4. Hx arthritis, fibromyalgia, chronic pain, DJD  5. GERD: on protonix  6. Hx stroke: pt reports stoke int he past, no deficits  7. COPD: Current smoker, smoking cessation education. Wean O2 for sats >92%, cont nebs, prn duoneb. 8. Anemia: received IV iron, monitor. 9. Hyperglycemia: Insulin gtt per endocrine recs. 10. Dispo: Cont ICU care, cont impella support -wean 1 P level per day.      Signed By: Alaina Stark NP

## 2019-05-24 NOTE — DIABETES MGMT
Consult Note     Recommendations/ Comments:  Consult received for pre/post op glucose management. On steroids - hydrocortisone tapered to 50 mg Q 8 hours    Currently on the insulin gtt  At 1450 , rate 4.7 units/hr. Received 18.6 units in past 6 hours    Noted Endocrinological derangement: On hydrocortisone 100 mg TID. 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 Followed by Endocrinologist Dr Cuate Estrada. Per his note 5/23/2019, plans are to remain on insulin gtt until Impella removed, then transition. He will provide recommendations for transition. Patient will require basal insulin 2 hours prior to discontinuing the drip. Chart reviewed on [facility]. S/p impella insertion on 5/21/19. Patient is 66 y.o. female  No previous hx of diabetes noted, A1c 6.4%. A1c:   Lab Results   Component Value Date/Time    Hemoglobin A1c 6.4 (H) 05/19/2019 08:11 AM         Recent Glucose Results:   Lab Results   Component Value Date/Time     (H) 05/24/2019 03:02 AM     (H) 05/23/2019 04:04 PM    GLUCPOC 147 (H) 05/24/2019 02:49 PM    GLUCPOC 146 (H) 05/24/2019 01:36 PM    GLUCPOC 157 (H) 05/24/2019 12:31 PM        Lab Results   Component Value Date/Time    Creatinine 0.78 05/24/2019 03:02 AM       Active Orders   Diet    DIET CARDIAC Regular; No Conc. Sweets        PO intake:   Patient Vitals for the past 72 hrs:   % Diet Eaten   05/23/19 1853 80 %   05/23/19 1300 90 %   05/23/19 0900 100 %   05/22/19 1800 75 %   05/22/19 0900 100 %   05/21/19 1710 0 %       Currently on insulin gtt. Will continue to follow as needed. Thank you.     Darvin Oropeza RN, CDE  [facility]  Pager: [phone]

## 2019-05-24 NOTE — PROGRESS NOTES
Progress Note    Patient: Fransisca Pitts MRN: [id number]  SSN: [id number]    YOB: 1960  Age: 66 y.o. Sex: female      Admit Date: 5/18/2019    LOS: 6 days     Subjective:     67 yo WF with non-STEMI, mild CAD, stress cardiomyopathy in the pattern of Takotsubo. Developed declining hemodynamic function 5/21/19 and required increased vasopressor support and emergent impella by CT surgery. Pt now off vasopressin ,paresh. Remains on cardene. She says she feels better today. SOB much improved, now off O2. Denies chest pain. Anxious for improvement and discharge. Objective:     Vitals:    05/24/19 1200 05/24/19 1230 05/24/19 1245 05/24/19 1300   BP:       Pulse: 87 79 84 99   Resp: 15 14 17 19   Temp: 98.2 °F (36.8 °C)      SpO2: 95% 96% 97% 93%   Weight:       Height:          Cardiac index 3  Intake and Output:  Current Shift: 05/24 0701 - 05/24 1900  In: 20 [I.V.:20]  Out: -   Last three shifts: 05/22 1901 - 05/24 0700  In: 5832.2 [P.O.:1600; I.V.:4232.2]  Out: 5820 [Urine:5820]    Physical Exam:   General:  Alert, cooperative, no distress, appears older than stated age. Sitting up in chair smiling   Eyes:  Conjunctivae/corneas clear. Pupils equal   Ears:  Hearing normal   Nose: Nares normal. Septum midline. Mucosa normal. No drainage   Mouth/Throat: Lips, mucosa, normal.   Neck: Supple, symmetrical, trachea midline,  Rt IJ swan in place. no JVD. Back:   Symmetric, no curvature. Lungs:   Diminished to auscultation bilateral bases. no wheeze, rales   Heart:  Regular rate and rhythm, S1, S2 normal, no murmur, click, rub or gallop. Abdomen:   Soft, non-distended, nontender. Bowel sounds normal. No masses,  No organomegaly. Extremities: Extremities normal, atraumatic, no cyanosis or edema. Pulses: 1+ and symmetric all extremities. Skin: Skin color, texture, turgor normal. No rashes or lesions       Neurologic: CNII-XII grossly intact.  CHRISTOPHER,        Lab/Data Review:  BMP:   Lab Results Component Value Date/Time     05/24/2019 03:02 AM    K 3.9 05/24/2019 03:02 AM     05/24/2019 03:02 AM    CO2 27 05/24/2019 03:02 AM    AGAP 7 05/24/2019 03:02 AM     (H) 05/24/2019 03:02 AM    BUN 12 05/24/2019 03:02 AM    CREA 0.78 05/24/2019 03:02 AM    GFRAA >60 05/24/2019 03:02 AM    GFRNA >60 05/24/2019 03:02 AM     CMP:   Lab Results   Component Value Date/Time     05/24/2019 03:02 AM    K 3.9 05/24/2019 03:02 AM     05/24/2019 03:02 AM    CO2 27 05/24/2019 03:02 AM    AGAP 7 05/24/2019 03:02 AM     (H) 05/24/2019 03:02 AM    BUN 12 05/24/2019 03:02 AM    CREA 0.78 05/24/2019 03:02 AM    GFRAA >60 05/24/2019 03:02 AM    GFRNA >60 05/24/2019 03:02 AM    CA 8.3 (L) 05/24/2019 03:02 AM    MG 1.9 05/24/2019 03:02 AM    ALB 2.4 (L) 05/24/2019 03:02 AM    TP 5.8 (L) 05/24/2019 03:02 AM    GLOB 3.4 05/24/2019 03:02 AM    AGRAT 0.7 (L) 05/24/2019 03:02 AM    SGOT 35 05/24/2019 03:02 AM    ALT 15 05/24/2019 03:02 AM         Assessment:     Active Problems:    NSTEMI (non-ST elevated myocardial infarction) ([facility] (5/18/2019)      Chest pain (5/18/2019)      Overview: Added automatically from request for surgery [id number]      CHF (congestive heart failure), NYHA class IV, acute on chronic, systolic ([facility] (8/10/7556)      Overview: Added automatically from request for surgery [id number]      Abnormal TSH/Free T3  Nonobstructive, mild CAD:   Left Anterior Descending   Ost LAD to Prox LAD lesion 20% stenosed. .   Prox LAD lesion 30% stenosed. .   Mid LAD lesion 60% stenosed. .   Left Circumflex   Prox Cx to Mid Cx lesion 30% stenosed. .   Right Coronary Artery   Prox RCA to Mid RCA lesion 30% stenosed. Marilou Johnson NSVT    Plan:     1. Stress cardiomyopathy/Acute HFrEF:  EF 20-25%   -AHF following. NYHA IV on admission   -Impella placed by CT surgery for cardiogenic shock/ hemodyanamic decline   -Wean as tolerated over next 3 days. 2.  CAD, mild: (see above)   -ASA. Start statin.  HDL 46, LDL 77.6    3. Endocrine derangement:   -Endocrine following   -Hypothyroid, possible hypopituitarism (unlikely per endocrine), DM (A1C=6.4)    4. Tobacco abuse:    -Discussed/encouraged smoking cessation. 5. NSVT: 4-5 runs of NSVT, (5-7 beats)   -Had 1 brief run early this a.m.   -Replete K and check magnesium    6. Hx of cocaine use   -UDS + for cocaine on admission   -was counseled on cessation/avoidance of illicit drugs. 7.1 Brief run of PAF on tele review (<1 min)   -Dig started by [facility] for #1. Will watch for now. Replete K and Mag. As above    8. Hypokalemia/hypomagnesemia:K=3.2, mg=1.5   -Repletion ordered      66 y.o. With stress cardiomyopathy who had hemodynamic decline 5/21/19 requiring vasosupport and emergent impella. Now hemodynamic status improved with impella;off vasopressors. On cardene per CT surgery. Pt feels better today, SOB much improved. F/CT surgery following. Echo shows severe hypokinesis of mid/distal walls (all) and apex in pattern of Takotsubo (base works well). EF 35-40%. Reassess Monday.     Signed By: Lakshmi Sunshine MD     May 24, 2019

## 2019-05-24 NOTE — PROGRESS NOTES
NYHA class IV acute systolic heart failure  Cardiogenic shock     Hgb and platelets look good    PTT therapeutic on bivalirudin     Creatinine normal    Bilirubin and other LFTs look good    LDH and lactic acid look good    Pro-calcitonin normal    NT pro-BNP about the same    Tolerating Impella wean    Discussed with HF team    Plan for removal monday    CXR - clear lung fields    Impella - flow 2.2 L/min @ P-4       Intake/Output Summary (Last 24 hours) at 5/24/2019 1320  Last data filed at 5/24/2019 0715  Gross per 24 hour   Intake 1796.68 ml   Output 3100 ml   Net -1303.32 ml     Risk of morbidity and mortality - high  Medical decision making - high complexity    Total critical care time - 30 minutes (CPT 61619)

## 2019-05-24 NOTE — PROGRESS NOTES
0800 - Bedside and Verbal shift change report given to me (oncoming nurse) by [facility] JOVANY, RN (offgoing nurse). Report included the following information SBAR, Kardex, ED Summary, Procedure Summary, Intake/Output, MAR, Recent Results, Med Rec Status and Cardiac Rhythm ST/SR. Pt sitting up in chair, denies c/o pain, SOB.    0900 - eating breakfast, surgical team rounding at bedside. 0930 - pt in bed getting echo. F team informed. 1000 - pt taking am meds, pt tearful at times, very conversive. F team rounding, new orders received and Impella turned down to P5, lab orders clarified. 1200 - Cardiology rounding at bedside, no new orders. 1400 - pt in bed resting. Denies c/o pain, SOB. 1530 - Bedside and Verbal shift change report given to Cassy Jack RN (oncoming nurse) by me (offgoing nurse). Report included the following information SBAR, Kardex, ED Summary, Procedure Summary, Intake/Output, MAR, Recent Results, Med Rec Status and Cardiac Rhythm SR/ST.

## 2019-05-24 NOTE — PALLIATIVE CARE
Palliative Medicine Social Work    Ms. Bridgette Bush is a 66year old woman with a history significant for COPD, CVA, CKD, GERD and fibromyalgia. She was admitted on 5/18 with dyspnea and chest pain. Work-up revealed NSTEMI with severely compromised LV (EF25%), presenting as Takotsubo cardiomyopathy. She has been treated with inotrope support; developed worsening hypotension and Impella placed emergently on 5/21. Her EF has now improved to 35-45%. She is not a candidate for LVAD, and plans are to wean patient from Impella support/remove. Palliative was consulted to further explore her wishes if she cannot be weaned from pump or her condition worsens. .    Dr. Julia Herrera and I met with her this afternoon. She was alert and emotionally engaging. She has an accurate understanding of her MI and heart failure; purpose of Impella and plan. She is thankful that her EF has improved; to be feeling so much better. She feels this event has been a wake-up call for her to change her lifestyle and behaviors; to become a more responsible citizen; to volunteer. Discussed unknowns and the importance of giving some thought to wishes. She agrees this is important. She related her trust in friend, Emily Mckeon ([phone]), as mPOA. He is also the father of her youngest child, Silvia Lei. She lives in the home with both. She does not have a preference for a secondary agent. Patient has two other children: Jocelyn Quezada lives in [facility] and is supportive. Oldest son is incarcerated. Engaged some in discussions about wishes in setting of imminent death; realities of resuscitative efforts at end-of-life. Reviewed questions in Living Will. Patient was tearful throughout discussions, but claims this as her baseline emotional state. She would like to think more about decisions. Encouraged he to talk more with Francia Stone as well. We will continue to be a part of her care and clarify goals.     Thank you for the opportunity to be involved in the care of Ms. Orlando Thomas. Nuria Adams, [employer], [facility]-  Palliative Medicine   Respecting Choices ® ACP Facilitator   [phone]
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
## 2019-05-24 NOTE — PROGRESS NOTES
1500 Bedside shift change report given to Brendan Esquivel (oncoming nurse) by Margarita Pitts (offgoing nurse). Report included the following information SBAR, MAR and Cardiac Rhythm NSR.    1530 Palliative team at bedside. 2000 Bedside shift change report given to [facility] (oncoming nurse) by Brendan Esquivel (offgoing nurse). Report included the following information SBAR, MAR and Cardiac Rhythm NSR.
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
## 2019-05-25 ENCOUNTER — APPOINTMENT (OUTPATIENT)
Dept: GENERAL RADIOLOGY | Age: 59
DRG: 215 | End: 2019-05-25
Attending: NURSE PRACTITIONER
Payer: MEDICARE

## 2019-05-25 LAB
ADMINISTERED INITIALS, ADMINIT: NORMAL
ALBUMIN SERPL-MCNC: 2.2 G/DL (ref 3.5–5)
ALBUMIN/GLOB SERPL: 0.7 {RATIO} (ref 1.1–2.2)
ALP SERPL-CCNC: 90 U/L (ref 45–117)
ALT SERPL-CCNC: 16 U/L (ref 12–78)
ANION GAP SERPL CALC-SCNC: 5 MMOL/L (ref 5–15)
APTT PPP: 54.5 SEC (ref 22.1–32)
AST SERPL-CCNC: 35 U/L (ref 15–37)
BASOPHILS # BLD: 0 K/UL (ref 0–0.1)
BASOPHILS NFR BLD: 0 % (ref 0–1)
BILIRUB SERPL-MCNC: 0.3 MG/DL (ref 0.2–1)
BNP SERPL-MCNC: ABNORMAL PG/ML
BUN SERPL-MCNC: 23 MG/DL (ref 6–20)
BUN/CREAT SERPL: 25 (ref 12–20)
CALCIUM SERPL-MCNC: 8.2 MG/DL (ref 8.5–10.1)
CARB RATIO, CHOR: 15
CARB RATIO, CHOR: 15
CARBOHYDRATE EATEN, CHO: 67 G
CARBOHYDRATE EATEN, CHO: 67 G
CHLORIDE SERPL-SCNC: 108 MMOL/L (ref 97–108)
CO2 SERPL-SCNC: 28 MMOL/L (ref 21–32)
CREAT SERPL-MCNC: 0.91 MG/DL (ref 0.55–1.02)
D50 ADMINISTERED, D50ADM: 0 ML
D50 ORDER, D50ORD: 0 ML
DIFFERENTIAL METHOD BLD: ABNORMAL
DIGOXIN SERPL-MCNC: 0.6 NG/ML (ref 0.9–2)
EOSINOPHIL # BLD: 0 K/UL (ref 0–0.4)
EOSINOPHIL NFR BLD: 0 % (ref 0–7)
ERYTHROCYTE [DISTWIDTH] IN BLOOD BY AUTOMATED COUNT: 13.5 % (ref 11.5–14.5)
GLOBULIN SER CALC-MCNC: 3 G/DL (ref 2–4)
GLSCOM COMMENTS: NORMAL
GLUCOSE BLD STRIP.AUTO-MCNC: 100 MG/DL (ref 65–100)
GLUCOSE BLD STRIP.AUTO-MCNC: 112 MG/DL (ref 65–100)
GLUCOSE BLD STRIP.AUTO-MCNC: 125 MG/DL (ref 65–100)
GLUCOSE BLD STRIP.AUTO-MCNC: 145 MG/DL (ref 65–100)
GLUCOSE BLD STRIP.AUTO-MCNC: 160 MG/DL (ref 65–100)
GLUCOSE BLD STRIP.AUTO-MCNC: 208 MG/DL (ref 65–100)
GLUCOSE BLD STRIP.AUTO-MCNC: 274 MG/DL (ref 65–100)
GLUCOSE BLD STRIP.AUTO-MCNC: 291 MG/DL (ref 65–100)
GLUCOSE BLD STRIP.AUTO-MCNC: 84 MG/DL (ref 65–100)
GLUCOSE BLD STRIP.AUTO-MCNC: 85 MG/DL (ref 65–100)
GLUCOSE BLD STRIP.AUTO-MCNC: 95 MG/DL (ref 65–100)
GLUCOSE BLD STRIP.AUTO-MCNC: 95 MG/DL (ref 65–100)
GLUCOSE SERPL-MCNC: 90 MG/DL (ref 65–100)
GLUCOSE, GLC: 100 MG/DL
GLUCOSE, GLC: 112 MG/DL
GLUCOSE, GLC: 125 MG/DL
GLUCOSE, GLC: 208 MG/DL
GLUCOSE, GLC: 274 MG/DL
GLUCOSE, GLC: 291 MG/DL
GLUCOSE, GLC: 84 MG/DL
GLUCOSE, GLC: 85 MG/DL
GLUCOSE, GLC: 95 MG/DL
GLUCOSE, GLC: 98 MG/DL
HCT VFR BLD AUTO: 26 % (ref 35–47)
HEMOCCULT STL QL: NEGATIVE
HGB BLD-MCNC: 8.3 G/DL (ref 11.5–16)
HIGH TARGET, HITG: 140 MG/DL
IMM GRANULOCYTES # BLD AUTO: 0 K/UL
IMM GRANULOCYTES NFR BLD AUTO: 0 %
INR PPP: 1.4 (ref 0.9–1.1)
INSULIN ADMINSTERED, INSADM: 0.5 UNITS/HOUR
INSULIN ADMINSTERED, INSADM: 0.5 UNITS/HOUR
INSULIN ADMINSTERED, INSADM: 0.6 UNITS/HOUR
INSULIN ADMINSTERED, INSADM: 0.8 UNITS/HOUR
INSULIN ADMINSTERED, INSADM: 1.1 UNITS/HOUR
INSULIN ADMINSTERED, INSADM: 1.4 UNITS/HOUR
INSULIN ADMINSTERED, INSADM: 1.5 UNITS/HOUR
INSULIN ADMINSTERED, INSADM: 1.7 UNITS/HOUR
INSULIN ADMINSTERED, INSADM: 3.3 UNITS/HOUR
INSULIN ADMINSTERED, INSADM: 4.7 UNITS/HOUR
INSULIN BOLUS ADMINISTERED, INSBOLADM: 4.5 UNITS/HOUR
INSULIN BOLUS ADMINISTERED, INSBOLADM: 4.5 UNITS/HOUR
INSULIN BOLUS ORDERED, INSBOLORD: 4.5 UNITS/HOUR
INSULIN BOLUS ORDERED, INSBOLORD: 4.5 UNITS/HOUR
INSULIN ORDER, INSORD: 0.5 UNITS/HOUR
INSULIN ORDER, INSORD: 0.5 UNITS/HOUR
INSULIN ORDER, INSORD: 0.6 UNITS/HOUR
INSULIN ORDER, INSORD: 0.8 UNITS/HOUR
INSULIN ORDER, INSORD: 1.1 UNITS/HOUR
INSULIN ORDER, INSORD: 1.4 UNITS/HOUR
INSULIN ORDER, INSORD: 1.5 UNITS/HOUR
INSULIN ORDER, INSORD: 1.7 UNITS/HOUR
INSULIN ORDER, INSORD: 3.3 UNITS/HOUR
INSULIN ORDER, INSORD: 4.7 UNITS/HOUR
LACTATE SERPL-SCNC: 1.1 MMOL/L (ref 0.4–2)
LDH SERPL L TO P-CCNC: 454 U/L (ref 81–246)
LOW TARGET, LOT: 100 MG/DL
LYMPHOCYTES # BLD: 1.5 K/UL (ref 0.8–3.5)
LYMPHOCYTES NFR BLD: 12 % (ref 12–49)
MAGNESIUM SERPL-MCNC: 1.9 MG/DL (ref 1.6–2.4)
MCH RBC QN AUTO: 28.2 PG (ref 26–34)
MCHC RBC AUTO-ENTMCNC: 31.9 G/DL (ref 30–36.5)
MCV RBC AUTO: 88.4 FL (ref 80–99)
MINUTES UNTIL NEXT BG, NBG: 60 MIN
MONOCYTES # BLD: 0.4 K/UL (ref 0–1)
MONOCYTES NFR BLD: 3 % (ref 5–13)
MULTIPLIER, MUL: 0.01
MULTIPLIER, MUL: 0.01
MULTIPLIER, MUL: 0.02
MULTIPLIER, MUL: 0.03
MULTIPLIER, MUL: 0.04
MULTIPLIER, MUL: 0.04
NEUTS SEG # BLD: 10.9 K/UL (ref 1.8–8)
NEUTS SEG NFR BLD: 85 % (ref 32–75)
NRBC # BLD: 0 K/UL (ref 0–0.01)
NRBC BLD-RTO: 0 PER 100 WBC
ORDER INITIALS, ORDINIT: NORMAL
PLATELET # BLD AUTO: 183 K/UL (ref 150–400)
PMV BLD AUTO: 9.4 FL (ref 8.9–12.9)
POTASSIUM SERPL-SCNC: 4.1 MMOL/L (ref 3.5–5.1)
PROCALCITONIN SERPL-MCNC: <0.1 NG/ML
PROT SERPL-MCNC: 5.2 G/DL (ref 6.4–8.2)
PROTHROMBIN TIME: 14.1 SEC (ref 9–11.1)
RBC # BLD AUTO: 2.94 M/UL (ref 3.8–5.2)
RBC MORPH BLD: ABNORMAL
SERVICE CMNT-IMP: ABNORMAL
SERVICE CMNT-IMP: NORMAL
SODIUM SERPL-SCNC: 141 MMOL/L (ref 136–145)
T4 FREE SERPL-MCNC: 1.1 NG/DL (ref 0.8–1.5)
THERAPEUTIC RANGE,PTTT: ABNORMAL SECS (ref 58–77)
WBC # BLD AUTO: 12.8 K/UL (ref 3.6–11)

## 2019-05-25 PROCEDURE — 74011250636 HC RX REV CODE- 250/636: Performed by: INTERNAL MEDICINE

## 2019-05-25 PROCEDURE — 74011250637 HC RX REV CODE- 250/637: Performed by: INTERNAL MEDICINE

## 2019-05-25 PROCEDURE — 82272 OCCULT BLD FECES 1-3 TESTS: CPT

## 2019-05-25 PROCEDURE — 85730 THROMBOPLASTIN TIME PARTIAL: CPT

## 2019-05-25 PROCEDURE — 85025 COMPLETE CBC W/AUTO DIFF WBC: CPT

## 2019-05-25 PROCEDURE — 71045 X-RAY EXAM CHEST 1 VIEW: CPT

## 2019-05-25 PROCEDURE — 74011250637 HC RX REV CODE- 250/637: Performed by: NURSE PRACTITIONER

## 2019-05-25 PROCEDURE — 74011000258 HC RX REV CODE- 258: Performed by: NURSE PRACTITIONER

## 2019-05-25 PROCEDURE — 74011250636 HC RX REV CODE- 250/636: Performed by: THORACIC SURGERY (CARDIOTHORACIC VASCULAR SURGERY)

## 2019-05-25 PROCEDURE — 82962 GLUCOSE BLOOD TEST: CPT

## 2019-05-25 PROCEDURE — 84145 PROCALCITONIN (PCT): CPT

## 2019-05-25 PROCEDURE — 74011250636 HC RX REV CODE- 250/636: Performed by: NURSE PRACTITIONER

## 2019-05-25 PROCEDURE — 80053 COMPREHEN METABOLIC PANEL: CPT

## 2019-05-25 PROCEDURE — 74011636637 HC RX REV CODE- 636/637: Performed by: THORACIC SURGERY (CARDIOTHORACIC VASCULAR SURGERY)

## 2019-05-25 PROCEDURE — 36415 COLL VENOUS BLD VENIPUNCTURE: CPT

## 2019-05-25 PROCEDURE — 83605 ASSAY OF LACTIC ACID: CPT

## 2019-05-25 PROCEDURE — 99291 CRITICAL CARE FIRST HOUR: CPT | Performed by: INTERNAL MEDICINE

## 2019-05-25 PROCEDURE — 65610000003 HC RM ICU SURGICAL

## 2019-05-25 PROCEDURE — 85610 PROTHROMBIN TIME: CPT

## 2019-05-25 PROCEDURE — 80162 ASSAY OF DIGOXIN TOTAL: CPT

## 2019-05-25 PROCEDURE — 83615 LACTATE (LD) (LDH) ENZYME: CPT

## 2019-05-25 PROCEDURE — 84439 ASSAY OF FREE THYROXINE: CPT

## 2019-05-25 PROCEDURE — 74011000250 HC RX REV CODE- 250: Performed by: HOSPITALIST

## 2019-05-25 PROCEDURE — 83880 ASSAY OF NATRIURETIC PEPTIDE: CPT

## 2019-05-25 PROCEDURE — 74011250637 HC RX REV CODE- 250/637: Performed by: HOSPITALIST

## 2019-05-25 PROCEDURE — 94640 AIRWAY INHALATION TREATMENT: CPT

## 2019-05-25 PROCEDURE — 74011250636 HC RX REV CODE- 250/636: Performed by: HOSPITALIST

## 2019-05-25 PROCEDURE — 83735 ASSAY OF MAGNESIUM: CPT

## 2019-05-25 RX ORDER — INSULIN LISPRO 100 [IU]/ML
INJECTION, SOLUTION INTRAVENOUS; SUBCUTANEOUS
Status: DISCONTINUED | OUTPATIENT
Start: 2019-05-25 | End: 2019-05-25

## 2019-05-25 RX ORDER — FUROSEMIDE 10 MG/ML
40 INJECTION INTRAMUSCULAR; INTRAVENOUS ONCE
Status: COMPLETED | OUTPATIENT
Start: 2019-05-25 | End: 2019-05-25

## 2019-05-25 RX ORDER — FUROSEMIDE 10 MG/ML
40 INJECTION INTRAMUSCULAR; INTRAVENOUS DAILY
Status: DISCONTINUED | OUTPATIENT
Start: 2019-05-26 | End: 2019-05-26

## 2019-05-25 RX ORDER — INSULIN GLARGINE 100 [IU]/ML
1-50 INJECTION, SOLUTION SUBCUTANEOUS
Status: ACTIVE | OUTPATIENT
Start: 2019-05-25 | End: 2019-05-26

## 2019-05-25 RX ORDER — INSULIN LISPRO 100 [IU]/ML
INJECTION, SOLUTION INTRAVENOUS; SUBCUTANEOUS
Status: DISCONTINUED | OUTPATIENT
Start: 2019-05-25 | End: 2019-05-26

## 2019-05-25 RX ORDER — LANOLIN ALCOHOL/MO/W.PET/CERES
400 CREAM (GRAM) TOPICAL DAILY
Status: DISCONTINUED | OUTPATIENT
Start: 2019-05-26 | End: 2019-06-04 | Stop reason: HOSPADM

## 2019-05-25 RX ORDER — INSULIN GLARGINE 100 [IU]/ML
8 INJECTION, SOLUTION SUBCUTANEOUS ONCE
Status: COMPLETED | OUTPATIENT
Start: 2019-05-25 | End: 2019-05-25

## 2019-05-25 RX ORDER — CARVEDILOL 6.25 MG/1
6.25 TABLET ORAL 2 TIMES DAILY WITH MEALS
Status: DISCONTINUED | OUTPATIENT
Start: 2019-05-25 | End: 2019-05-27

## 2019-05-25 RX ADMIN — SPIRONOLACTONE 25 MG: 25 TABLET ORAL at 18:17

## 2019-05-25 RX ADMIN — INSULIN LISPRO 4.5 UNITS: 100 INJECTION, SOLUTION INTRAVENOUS; SUBCUTANEOUS at 11:39

## 2019-05-25 RX ADMIN — CLONAZEPAM 0.5 MG: 0.5 TABLET ORAL at 22:06

## 2019-05-25 RX ADMIN — POTASSIUM CHLORIDE 40 MEQ: 750 TABLET, FILM COATED, EXTENDED RELEASE ORAL at 21:07

## 2019-05-25 RX ADMIN — OXYCODONE HYDROCHLORIDE AND ACETAMINOPHEN 1 TABLET: 5; 325 TABLET ORAL at 16:04

## 2019-05-25 RX ADMIN — Medication 2 G: at 08:17

## 2019-05-25 RX ADMIN — SPIRONOLACTONE 25 MG: 25 TABLET ORAL at 08:16

## 2019-05-25 RX ADMIN — HYDRALAZINE HYDROCHLORIDE 25 MG: 25 TABLET, FILM COATED ORAL at 21:07

## 2019-05-25 RX ADMIN — ISOSORBIDE DINITRATE 20 MG: 20 TABLET ORAL at 21:07

## 2019-05-25 RX ADMIN — HYDROCORTISONE SODIUM SUCCINATE 50 MG: 100 INJECTION, POWDER, FOR SOLUTION INTRAMUSCULAR; INTRAVENOUS at 03:22

## 2019-05-25 RX ADMIN — POTASSIUM CHLORIDE 40 MEQ: 750 TABLET, FILM COATED, EXTENDED RELEASE ORAL at 16:04

## 2019-05-25 RX ADMIN — Medication 10 ML: at 05:06

## 2019-05-25 RX ADMIN — SODIUM CHLORIDE 9 ML/HR: 900 INJECTION, SOLUTION INTRAVENOUS at 05:34

## 2019-05-25 RX ADMIN — OXYCODONE HYDROCHLORIDE AND ACETAMINOPHEN 1 TABLET: 5; 325 TABLET ORAL at 02:28

## 2019-05-25 RX ADMIN — POLYETHYLENE GLYCOL 3350 17 G: 17 POWDER, FOR SOLUTION ORAL at 08:16

## 2019-05-25 RX ADMIN — HYDROCORTISONE SODIUM SUCCINATE 50 MG: 100 INJECTION, POWDER, FOR SOLUTION INTRAMUSCULAR; INTRAVENOUS at 11:37

## 2019-05-25 RX ADMIN — MORPHINE SULFATE 4 MG: 4 INJECTION INTRAVENOUS at 21:04

## 2019-05-25 RX ADMIN — CARVEDILOL 6.25 MG: 6.25 TABLET, FILM COATED ORAL at 16:04

## 2019-05-25 RX ADMIN — PRAVASTATIN SODIUM 40 MG: 40 TABLET ORAL at 21:07

## 2019-05-25 RX ADMIN — INSULIN LISPRO 2 UNITS: 100 INJECTION, SOLUTION INTRAVENOUS; SUBCUTANEOUS at 21:41

## 2019-05-25 RX ADMIN — OXYCODONE HYDROCHLORIDE AND ACETAMINOPHEN 1 TABLET: 5; 325 TABLET ORAL at 08:16

## 2019-05-25 RX ADMIN — BIVALIRUDIN 12 ML/HR: 250 INJECTION, POWDER, LYOPHILIZED, FOR SOLUTION INTRAVENOUS at 08:03

## 2019-05-25 RX ADMIN — OXYCODONE HYDROCHLORIDE AND ACETAMINOPHEN 1 TABLET: 5; 325 TABLET ORAL at 13:02

## 2019-05-25 RX ADMIN — ISOSORBIDE DINITRATE 20 MG: 20 TABLET ORAL at 08:16

## 2019-05-25 RX ADMIN — ASPIRIN 81 MG: 81 TABLET ORAL at 08:16

## 2019-05-25 RX ADMIN — Medication 10 ML: at 11:53

## 2019-05-25 RX ADMIN — PANTOPRAZOLE SODIUM 40 MG: 40 TABLET, DELAYED RELEASE ORAL at 07:08

## 2019-05-25 RX ADMIN — HYDROCORTISONE SODIUM SUCCINATE 50 MG: 100 INJECTION, POWDER, FOR SOLUTION INTRAMUSCULAR; INTRAVENOUS at 20:58

## 2019-05-25 RX ADMIN — SENNOSIDES,DOCUSATE SODIUM 1 TABLET: 8.6; 5 TABLET, FILM COATED ORAL at 08:16

## 2019-05-25 RX ADMIN — HYDRALAZINE HYDROCHLORIDE 25 MG: 25 TABLET, FILM COATED ORAL at 16:04

## 2019-05-25 RX ADMIN — INSULIN GLARGINE 8 UNITS: 100 INJECTION, SOLUTION SUBCUTANEOUS at 11:53

## 2019-05-25 RX ADMIN — FUROSEMIDE 40 MG: 10 INJECTION, SOLUTION INTRAMUSCULAR; INTRAVENOUS at 13:03

## 2019-05-25 RX ADMIN — Medication 10 ML: at 13:03

## 2019-05-25 RX ADMIN — POTASSIUM CHLORIDE 40 MEQ: 750 TABLET, FILM COATED, EXTENDED RELEASE ORAL at 08:16

## 2019-05-25 RX ADMIN — ISOSORBIDE DINITRATE 20 MG: 20 TABLET ORAL at 16:04

## 2019-05-25 RX ADMIN — BUDESONIDE 250 MCG: 0.25 INHALANT RESPIRATORY (INHALATION) at 09:53

## 2019-05-25 RX ADMIN — DIGOXIN 0.25 MG: 250 TABLET ORAL at 08:16

## 2019-05-25 RX ADMIN — Medication 10 ML: at 21:05

## 2019-05-25 RX ADMIN — INSULIN LISPRO 4.5 UNITS: 100 INJECTION, SOLUTION INTRAVENOUS; SUBCUTANEOUS at 09:46

## 2019-05-25 RX ADMIN — HYDRALAZINE HYDROCHLORIDE 25 MG: 25 TABLET, FILM COATED ORAL at 08:16

## 2019-05-25 RX ADMIN — Medication 2 G: at 16:04

## 2019-05-25 NOTE — PROGRESS NOTES
Bedside shift change report given to [facility] (oncoming nurse) by Edna Mckeon (offgoing nurse). Report included the following information SBAR, Kardex, MAR and Recent Results.

## 2019-05-25 NOTE — PROGRESS NOTES
1945: Report received from [place]. Drips verified. Pt in bed talking w/ family/visitors. Pt calm and cooperative. On RA, sating >93%. Impella running w/ bival drip and NS pressure bag, on P level 5. Plan to wean Impella by 1 level per day w/ anticipated removal on Tuesday (5/28). 2104: Congested, moist cough. PRN Guaifenesin given. 3420: Labs sent    6331: CXR at bedside    0500: Pt's SBP ranging from 120-150s most of the shift. Held off on restarting cardene b/c pt would not remain in the 150s for more an a minute or so. 0451: Right neck CVC catheter dressing changed, was no longer occlusive.    0700: Sustained hypertension, -160s. Will inform MD when rounding. 0745: Bedside and Verbal shift change report given to Cassandra Silvestre RN. Report included the following information SBAR, Intake/Output, MAR, Recent Results and Cardiac Rhythm NSR. Problem: Heart Failure: Day 3  Goal: *Oxygen saturation within defined limits  Outcome: Progressing Towards Goal  Note:   Pt on RA, sating >93%. Goal: *Hemodynamically stable  Outcome: Progressing Towards Goal  Note:   No pressors or inotropes. Goal: *Optimal pain control at patient's stated goal  Outcome: Progressing Towards Goal  Note:   Pt receiving PRN roxicodone &/or morphine for mod-sev pain. Pt verbalizes pain relief and is able to sleep. Goal: *Anxiety reduced or absent  Outcome: Progressing Towards Goal  Note:   Pt calm and cooperative. Goal: *Demonstrates progressive activity  Outcome: Progressing Towards Goal  Note:   OOB w/ minimal assistance. Pt ambulates w/o gait disturbance. Problem: Falls - Risk of  Goal: *Absence of Falls  Description  Document Narciso Coe Fall Risk and appropriate interventions in the flowsheet.   Outcome: Progressing Towards Goal  Note:   Fall Risk Interventions:  Mobility Interventions: Communicate number of staff needed for ambulation/transfer, Patient to call before getting OOB, PT Consult for mobility concerns, Strengthening exercises (ROM-active/passive)  Medication Interventions: Evaluate medications/consider consulting pharmacy, Patient to call before getting OOB, Teach patient to arise slowly  Elimination Interventions: Call light in reach, Patient to call for help with toileting needs, Toileting schedule/hourly rounds, Stay With Me (per policy)     Problem: Pressure Injury - Risk of  Goal: *Prevention of pressure injury  Description  Document Salvatore Scale and appropriate interventions in the flowsheet. Outcome: Progressing Towards Goal  Note:   Pressure Injury Interventions:  Sensory Interventions: Check visual cues for pain, Float heels, Keep linens dry and wrinkle-free, Maintain/enhance activity level, Minimize linen layers, Pad between skin to skin, Pressure redistribution bed/mattress (bed type), Turn and reposition approx. every two hours (pillows and wedges if needed)  Moisture Interventions: Absorbent underpads, Check for incontinence Q2 hours and as needed, Maintain skin hydration (lotion/cream), Minimize layers, Offer toileting Q_hr  Activity Interventions: PT/OT evaluation, Increase time out of bed, Pressure redistribution bed/mattress(bed type)  Mobility Interventions: HOB 30 degrees or less, Pressure redistribution bed/mattress (bed type), PT/OT evaluation, Turn and reposition approx.  every two hours(pillow and wedges)  Nutrition Interventions: Document food/fluid/supplement intake, Offer support with meals,snacks and hydration  Friction and Shear Interventions: Lift sheet, Minimize layers

## 2019-05-25 NOTE — PROGRESS NOTES
[facility] ICU Progress Note    Admit Date: 2019  POD:  4 Day Post-Op    Procedure:  Procedure(s):  IMPELLA INSERTION RIGHT SUBCLAVIAN . TRANSESOPHAGEAL ECHOCARDIOGRAM BY DR. JACOB [facility]. Subjective:   Pt seen with Dr. Molina Coello  RA. Off cardene CI 3.7    insulin gtt. Bival through impella. P5.   Up in chair. No complaints. Turn down to P-4  Impella removal Tuesday     Objective:   Vitals:  Blood pressure 138/70, pulse (!) 108, temperature 98.3 °F (36.8 °C), resp. rate 16, height 5' 4\" (1.626 m), weight 126 lb 1.6 oz (57.2 kg), last menstrual period 2011, SpO2 95 %. Temp (24hrs), Av.9 °F (36.6 °C), Min:97.4 °F (36.3 °C), Max:98.7 °F (37.1 °C)    Hemodynamics:   CO: CO (l/min): 4.6 l/min   CI: CI (l/min/m2): 2.9 l/min/m2   CVP: CVP (mmHg): 2 mmHg (19)   SVR: SVR (dyne*sec)/cm5: 1673 (dyne*sec)/cm5 (19 9299)   PAP Systolic: PAP Systolic: 20 (32/42/09 3269)   PAP Diastolic: PAP Diastolic: 12 (82/60/73 4920)   PVR:     SV02: SVO2 (%): (unable to draw off mixed, MD aware) (19)   SCV02:      EKG/Rhythm:  NSR 80s    Extubation Date / Time: 19    Oxygen Therapy:  Oxygen Therapy  O2 Sat (%): 95 % (19)  Pulse via Oximetry: 108 beats per minute (19)  O2 Device: Room air (19)  O2 Flow Rate (L/min): 2 l/min (19)  O2 Temperature: 39.2 °F (4 °C) (19)  FIO2 (%): 40 % (05/21/19 1917)    CXR:   CXR Results  (Last 48 hours)               19 8899  XR CHEST PORT Final result    Impression:  No acute cardiopulmonary disease radiographically. .  . Stable lines and tubes. Narrative:  INDICATION:  PAC placement        EXAM: Chest single view. COMPARISON: 2019. FINDINGS: A single frontal view of the chest at 0408 hours shows clear lungs. Impella device from the right subclavian approach is stable. PA catheter is   stable. Skin staples over the right upper lung field are stable with no   pneumothorax. The heart, mediastinum and pulmonary vasculature are stable . The   bony thorax is unremarkable for age. Melania Martines 05/24/19 0529  XR CHEST PORT Final result    Impression:  1. Improved aeration with decrease in pleural effusions. Narrative:  EXAM: XR CHEST PORT       INDICATION: Crewe-Krishan catheter placement, cardiac assist device       COMPARISON: 5/23/2019       FINDINGS: A portable AP radiograph of the chest was obtained at 0356 hours. The   patient is on a cardiac monitor. The Crewe-Krishan catheter and cardiac assist   device are in satisfactory position. Lungs demonstrate no pulmonary edema. There   is improved aeration. There is minimal atelectasis in left lower lobe which has   decreased. Small pleural effusions have decreased. Admission Weight: Last Weight   Weight: 125 lb (56.7 kg) Weight: 126 lb 1.6 oz (57.2 kg)     Intake / Output / Drain:  Current Shift: 05/25 0701 - 05/25 1900  In: -   Out: 300 [Urine:300]  Last 24 hrs.:     Intake/Output Summary (Last 24 hours) at 5/25/2019 0956  Last data filed at 5/25/2019 0710  Gross per 24 hour   Intake 2311.01 ml   Output 1900 ml   Net 411.01 ml       EXAM:  General:  NAD, alert                                                                                           Lungs:   Clear to auscultation bilaterally. Incision:  dsg cdi   Heart:  Regular rate and rhythm, S1, S2 normal, no murmur, click, rub or gallop. Abdomen:   Soft, non-tender. Bowel sounds normal. No masses,  No organomegaly. Extremities:  No edema. PPP. Neurologic:  Gross motor and sensory apparatus intact. Labs:   Recent Labs     05/25/19  0802  05/25/19  0338   WBC  --   --  12.8*   HGB  --   --  8.3*   HCT  --   --  26.0*   PLT  --   --  183   NA  --   --  141   K  --   --  4.1   BUN  --   --  23*   CREA  --   --  0.91   GLU  --   --  90   GLUCPOC 95   < >  --    INR  --   --  1.4*    < > = values in this interval not displayed.         Assessment:     Active Problems:    NSTEMI (non-ST elevated myocardial infarction) ([facility] [address] (2019)      Chest pain (2019)      Overview: Added automatically from request for surgery [id number]      CHF (congestive heart failure), NYHA class IV, acute on chronic, systolic ([facility] [address] (3845)      Overview: Added automatically from request for surgery [id number]         Plan/Recommendations/Medical Decision Makin. Acute systolic heart failure (NYHA class IV on admission): Impella placed . Cont impella P6. On ancef for impella ppx. Cont bival through purge.  -improved some, lactate 0.6. NT proBNP 8900-improved some. AHF following. On dig, aldactone. Plan to wean impella 1 P level per day over the weekend -possibly remove impella on   2. Atelectasis: Encourage I/S, on RA. 3. Endocrinological derangement: On hydrocortisone -starting to wean dose per AHF/endocrine. Endocrine following. 4. Hx arthritis, fibromyalgia, chronic pain, DJD  5. GERD: on protonix  6. Hx stroke: pt reports stoke int he past, no deficits  7. COPD: Current smoker, smoking cessation education. Wean O2 for sats >92%, cont nebs, prn duoneb. 8. Anemia: received IV iron, monitor. 9. Hyperglycemia: Insulin gtt per endocrine recs. 10. Dispo: Cont ICU care, cont impella support -wean 1 P level per day.      Signed By: CONNIE Worrell
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
## 2019-05-26 ENCOUNTER — APPOINTMENT (OUTPATIENT)
Dept: GENERAL RADIOLOGY | Age: 59
DRG: 215 | End: 2019-05-26
Attending: NURSE PRACTITIONER
Payer: MEDICARE

## 2019-05-26 LAB
ALBUMIN SERPL-MCNC: 2.5 G/DL (ref 3.5–5)
ALBUMIN/GLOB SERPL: 0.8 {RATIO} (ref 1.1–2.2)
ALP SERPL-CCNC: 104 U/L (ref 45–117)
ALT SERPL-CCNC: 12 U/L (ref 12–78)
ANION GAP SERPL CALC-SCNC: 5 MMOL/L (ref 5–15)
APTT PPP: 53 SEC (ref 22.1–32)
AST SERPL-CCNC: 32 U/L (ref 15–37)
BASOPHILS # BLD: 0 K/UL (ref 0–0.1)
BASOPHILS NFR BLD: 0 % (ref 0–1)
BILIRUB SERPL-MCNC: 0.3 MG/DL (ref 0.2–1)
BNP SERPL-MCNC: ABNORMAL PG/ML
BUN SERPL-MCNC: 25 MG/DL (ref 6–20)
BUN/CREAT SERPL: 28 (ref 12–20)
CALCIUM SERPL-MCNC: 8.5 MG/DL (ref 8.5–10.1)
CHLORIDE SERPL-SCNC: 103 MMOL/L (ref 97–108)
CO2 SERPL-SCNC: 30 MMOL/L (ref 21–32)
CREAT SERPL-MCNC: 0.9 MG/DL (ref 0.55–1.02)
DIFFERENTIAL METHOD BLD: ABNORMAL
DIGOXIN SERPL-MCNC: 1.1 NG/ML (ref 0.9–2)
EOSINOPHIL # BLD: 0 K/UL (ref 0–0.4)
EOSINOPHIL NFR BLD: 0 % (ref 0–7)
ERYTHROCYTE [DISTWIDTH] IN BLOOD BY AUTOMATED COUNT: 13.4 % (ref 11.5–14.5)
GLOBULIN SER CALC-MCNC: 3.1 G/DL (ref 2–4)
GLUCOSE BLD STRIP.AUTO-MCNC: 163 MG/DL (ref 65–100)
GLUCOSE BLD STRIP.AUTO-MCNC: 196 MG/DL (ref 65–100)
GLUCOSE BLD STRIP.AUTO-MCNC: 205 MG/DL (ref 65–100)
GLUCOSE BLD STRIP.AUTO-MCNC: 213 MG/DL (ref 65–100)
GLUCOSE SERPL-MCNC: 147 MG/DL (ref 65–100)
HCT VFR BLD AUTO: 28.7 % (ref 35–47)
HGB BLD-MCNC: 9.3 G/DL (ref 11.5–16)
IMM GRANULOCYTES # BLD AUTO: 0 K/UL
IMM GRANULOCYTES NFR BLD AUTO: 0 %
INR PPP: 1.4 (ref 0.9–1.1)
LACTATE SERPL-SCNC: 0.7 MMOL/L (ref 0.4–2)
LDH SERPL L TO P-CCNC: 473 U/L (ref 81–246)
LDH SERPL L TO P-CCNC: 489 U/L (ref 81–246)
LYMPHOCYTES # BLD: 1.3 K/UL (ref 0.8–3.5)
LYMPHOCYTES NFR BLD: 10 % (ref 12–49)
MAGNESIUM SERPL-MCNC: 1.8 MG/DL (ref 1.6–2.4)
MCH RBC QN AUTO: 28.8 PG (ref 26–34)
MCHC RBC AUTO-ENTMCNC: 32.4 G/DL (ref 30–36.5)
MCV RBC AUTO: 88.9 FL (ref 80–99)
METAMYELOCYTES NFR BLD MANUAL: 2 %
MONOCYTES # BLD: 0.7 K/UL (ref 0–1)
MONOCYTES NFR BLD: 5 % (ref 5–13)
NEUTS SEG # BLD: 10.9 K/UL (ref 1.8–8)
NEUTS SEG NFR BLD: 83 % (ref 32–75)
NRBC # BLD: 0.03 K/UL (ref 0–0.01)
NRBC BLD-RTO: 0.2 PER 100 WBC
PLATELET # BLD AUTO: 170 K/UL (ref 150–400)
PLATELET COMMENTS,PCOM: ABNORMAL
PMV BLD AUTO: 9.6 FL (ref 8.9–12.9)
POTASSIUM SERPL-SCNC: 4.4 MMOL/L (ref 3.5–5.1)
PROCALCITONIN SERPL-MCNC: <0.1 NG/ML
PROT SERPL-MCNC: 5.6 G/DL (ref 6.4–8.2)
PROTHROMBIN TIME: 13.6 SEC (ref 9–11.1)
RBC # BLD AUTO: 3.23 M/UL (ref 3.8–5.2)
RBC MORPH BLD: ABNORMAL
RBC MORPH BLD: ABNORMAL
SERVICE CMNT-IMP: ABNORMAL
SODIUM SERPL-SCNC: 138 MMOL/L (ref 136–145)
T4 FREE SERPL-MCNC: 1.2 NG/DL (ref 0.8–1.5)
THERAPEUTIC RANGE,PTTT: ABNORMAL SECS (ref 58–77)
WBC # BLD AUTO: 13.1 K/UL (ref 3.6–11)

## 2019-05-26 PROCEDURE — 84145 PROCALCITONIN (PCT): CPT

## 2019-05-26 PROCEDURE — 36415 COLL VENOUS BLD VENIPUNCTURE: CPT

## 2019-05-26 PROCEDURE — 84439 ASSAY OF FREE THYROXINE: CPT

## 2019-05-26 PROCEDURE — 74011000250 HC RX REV CODE- 250: Performed by: HOSPITALIST

## 2019-05-26 PROCEDURE — 74011250636 HC RX REV CODE- 250/636: Performed by: HOSPITALIST

## 2019-05-26 PROCEDURE — 74011250636 HC RX REV CODE- 250/636: Performed by: NURSE PRACTITIONER

## 2019-05-26 PROCEDURE — 85730 THROMBOPLASTIN TIME PARTIAL: CPT

## 2019-05-26 PROCEDURE — 74011000258 HC RX REV CODE- 258: Performed by: NURSE PRACTITIONER

## 2019-05-26 PROCEDURE — 74011250637 HC RX REV CODE- 250/637: Performed by: NURSE PRACTITIONER

## 2019-05-26 PROCEDURE — 74011250636 HC RX REV CODE- 250/636: Performed by: THORACIC SURGERY (CARDIOTHORACIC VASCULAR SURGERY)

## 2019-05-26 PROCEDURE — 99291 CRITICAL CARE FIRST HOUR: CPT | Performed by: INTERNAL MEDICINE

## 2019-05-26 PROCEDURE — 85025 COMPLETE CBC W/AUTO DIFF WBC: CPT

## 2019-05-26 PROCEDURE — 74011636637 HC RX REV CODE- 636/637: Performed by: INTERNAL MEDICINE

## 2019-05-26 PROCEDURE — 74011636637 HC RX REV CODE- 636/637: Performed by: THORACIC SURGERY (CARDIOTHORACIC VASCULAR SURGERY)

## 2019-05-26 PROCEDURE — 77030013798 HC KT TRNSDUC PRSSR EDWD -B

## 2019-05-26 PROCEDURE — 83615 LACTATE (LD) (LDH) ENZYME: CPT

## 2019-05-26 PROCEDURE — 74011250637 HC RX REV CODE- 250/637: Performed by: HOSPITALIST

## 2019-05-26 PROCEDURE — 94640 AIRWAY INHALATION TREATMENT: CPT

## 2019-05-26 PROCEDURE — 80162 ASSAY OF DIGOXIN TOTAL: CPT

## 2019-05-26 PROCEDURE — 65610000003 HC RM ICU SURGICAL

## 2019-05-26 PROCEDURE — 74011250636 HC RX REV CODE- 250/636: Performed by: INTERNAL MEDICINE

## 2019-05-26 PROCEDURE — 83735 ASSAY OF MAGNESIUM: CPT

## 2019-05-26 PROCEDURE — 82962 GLUCOSE BLOOD TEST: CPT

## 2019-05-26 PROCEDURE — 74011250637 HC RX REV CODE- 250/637: Performed by: INTERNAL MEDICINE

## 2019-05-26 PROCEDURE — 74011000250 HC RX REV CODE- 250: Performed by: THORACIC SURGERY (CARDIOTHORACIC VASCULAR SURGERY)

## 2019-05-26 PROCEDURE — 80053 COMPREHEN METABOLIC PANEL: CPT

## 2019-05-26 PROCEDURE — 85610 PROTHROMBIN TIME: CPT

## 2019-05-26 PROCEDURE — 83605 ASSAY OF LACTIC ACID: CPT

## 2019-05-26 PROCEDURE — 83880 ASSAY OF NATRIURETIC PEPTIDE: CPT

## 2019-05-26 PROCEDURE — 71045 X-RAY EXAM CHEST 1 VIEW: CPT

## 2019-05-26 RX ORDER — HYDROCORTISONE SODIUM SUCCINATE 100 MG/2ML
25 INJECTION, POWDER, FOR SOLUTION INTRAMUSCULAR; INTRAVENOUS EVERY 8 HOURS
Status: DISCONTINUED | OUTPATIENT
Start: 2019-05-26 | End: 2019-05-28

## 2019-05-26 RX ORDER — CHLORHEXIDINE GLUCONATE 1.2 MG/ML
15 RINSE ORAL 2 TIMES DAILY
Status: DISCONTINUED | OUTPATIENT
Start: 2019-05-26 | End: 2019-06-04 | Stop reason: HOSPADM

## 2019-05-26 RX ORDER — INSULIN LISPRO 100 [IU]/ML
INJECTION, SOLUTION INTRAVENOUS; SUBCUTANEOUS
Status: DISCONTINUED | OUTPATIENT
Start: 2019-05-26 | End: 2019-06-03

## 2019-05-26 RX ORDER — FUROSEMIDE 40 MG/1
40 TABLET ORAL DAILY
Status: DISCONTINUED | OUTPATIENT
Start: 2019-05-27 | End: 2019-05-31

## 2019-05-26 RX ADMIN — BIVALIRUDIN 12.1 ML/HR: 250 INJECTION, POWDER, LYOPHILIZED, FOR SOLUTION INTRAVENOUS at 21:50

## 2019-05-26 RX ADMIN — ASPIRIN 81 MG: 81 TABLET ORAL at 08:50

## 2019-05-26 RX ADMIN — PRAVASTATIN SODIUM 40 MG: 40 TABLET ORAL at 21:50

## 2019-05-26 RX ADMIN — SODIUM CHLORIDE 9 ML/HR: 900 INJECTION, SOLUTION INTRAVENOUS at 06:23

## 2019-05-26 RX ADMIN — Medication 10 ML: at 05:03

## 2019-05-26 RX ADMIN — PANTOPRAZOLE SODIUM 40 MG: 40 TABLET, DELAYED RELEASE ORAL at 06:58

## 2019-05-26 RX ADMIN — OXYCODONE HYDROCHLORIDE AND ACETAMINOPHEN 1 TABLET: 5; 325 TABLET ORAL at 00:16

## 2019-05-26 RX ADMIN — POTASSIUM CHLORIDE 40 MEQ: 750 TABLET, FILM COATED, EXTENDED RELEASE ORAL at 08:50

## 2019-05-26 RX ADMIN — MORPHINE SULFATE 4 MG: 4 INJECTION INTRAVENOUS at 03:30

## 2019-05-26 RX ADMIN — Medication 10 ML: at 12:07

## 2019-05-26 RX ADMIN — HYDRALAZINE HYDROCHLORIDE 25 MG: 25 TABLET, FILM COATED ORAL at 08:50

## 2019-05-26 RX ADMIN — DIGOXIN 0.25 MG: 250 TABLET ORAL at 08:50

## 2019-05-26 RX ADMIN — BUDESONIDE 250 MCG: 0.25 INHALANT RESPIRATORY (INHALATION) at 20:27

## 2019-05-26 RX ADMIN — INSULIN LISPRO 2 UNITS: 100 INJECTION, SOLUTION INTRAVENOUS; SUBCUTANEOUS at 07:10

## 2019-05-26 RX ADMIN — ISOSORBIDE DINITRATE 20 MG: 20 TABLET ORAL at 08:50

## 2019-05-26 RX ADMIN — SPIRONOLACTONE 25 MG: 25 TABLET ORAL at 08:50

## 2019-05-26 RX ADMIN — INSULIN LISPRO 4 UNITS: 100 INJECTION, SOLUTION INTRAVENOUS; SUBCUTANEOUS at 21:49

## 2019-05-26 RX ADMIN — POTASSIUM CHLORIDE 40 MEQ: 750 TABLET, FILM COATED, EXTENDED RELEASE ORAL at 15:24

## 2019-05-26 RX ADMIN — HYDROCORTISONE SODIUM SUCCINATE 25 MG: 100 INJECTION, POWDER, FOR SOLUTION INTRAMUSCULAR; INTRAVENOUS at 12:07

## 2019-05-26 RX ADMIN — OXYCODONE HYDROCHLORIDE AND ACETAMINOPHEN 1 TABLET: 5; 325 TABLET ORAL at 06:58

## 2019-05-26 RX ADMIN — OXYCODONE HYDROCHLORIDE AND ACETAMINOPHEN 1 TABLET: 5; 325 TABLET ORAL at 16:52

## 2019-05-26 RX ADMIN — INSULIN LISPRO 4 UNITS: 100 INJECTION, SOLUTION INTRAVENOUS; SUBCUTANEOUS at 16:49

## 2019-05-26 RX ADMIN — POTASSIUM CHLORIDE 40 MEQ: 750 TABLET, FILM COATED, EXTENDED RELEASE ORAL at 21:50

## 2019-05-26 RX ADMIN — CARVEDILOL 6.25 MG: 6.25 TABLET, FILM COATED ORAL at 16:50

## 2019-05-26 RX ADMIN — Medication 2 G: at 23:55

## 2019-05-26 RX ADMIN — HYDROCORTISONE SODIUM SUCCINATE 50 MG: 100 INJECTION, POWDER, FOR SOLUTION INTRAMUSCULAR; INTRAVENOUS at 03:19

## 2019-05-26 RX ADMIN — Medication 400 MG: at 08:50

## 2019-05-26 RX ADMIN — OXYCODONE HYDROCHLORIDE AND ACETAMINOPHEN 1 TABLET: 5; 325 TABLET ORAL at 12:09

## 2019-05-26 RX ADMIN — BUDESONIDE 250 MCG: 0.25 INHALANT RESPIRATORY (INHALATION) at 08:00

## 2019-05-26 RX ADMIN — HYDRALAZINE HYDROCHLORIDE 25 MG: 25 TABLET, FILM COATED ORAL at 15:24

## 2019-05-26 RX ADMIN — CLONAZEPAM 0.5 MG: 0.5 TABLET ORAL at 20:37

## 2019-05-26 RX ADMIN — FUROSEMIDE 40 MG: 10 INJECTION, SOLUTION INTRAMUSCULAR; INTRAVENOUS at 09:55

## 2019-05-26 RX ADMIN — Medication 2 G: at 00:17

## 2019-05-26 RX ADMIN — HYDROCORTISONE SODIUM SUCCINATE 25 MG: 100 INJECTION, POWDER, FOR SOLUTION INTRAMUSCULAR; INTRAVENOUS at 20:42

## 2019-05-26 RX ADMIN — Medication 10 ML: at 14:31

## 2019-05-26 RX ADMIN — CHLORHEXIDINE GLUCONATE 15 ML: 1.2 RINSE ORAL at 20:37

## 2019-05-26 RX ADMIN — Medication 2 G: at 07:52

## 2019-05-26 RX ADMIN — CARVEDILOL 6.25 MG: 6.25 TABLET, FILM COATED ORAL at 07:52

## 2019-05-26 RX ADMIN — ISOSORBIDE DINITRATE 20 MG: 20 TABLET ORAL at 15:23

## 2019-05-26 RX ADMIN — INSULIN LISPRO 2 UNITS: 100 INJECTION, SOLUTION INTRAVENOUS; SUBCUTANEOUS at 12:06

## 2019-05-26 RX ADMIN — Medication 2 G: at 15:24

## 2019-05-26 RX ADMIN — HYDRALAZINE HYDROCHLORIDE 25 MG: 25 TABLET, FILM COATED ORAL at 21:50

## 2019-05-26 RX ADMIN — ISOSORBIDE DINITRATE 20 MG: 20 TABLET ORAL at 21:50

## 2019-05-26 RX ADMIN — SPIRONOLACTONE 25 MG: 25 TABLET ORAL at 17:03

## 2019-05-26 RX ADMIN — OXYCODONE HYDROCHLORIDE AND ACETAMINOPHEN 1 TABLET: 5; 325 TABLET ORAL at 20:37

## 2019-05-26 RX ADMIN — BIVALIRUDIN 12.5 ML/HR: 250 INJECTION, POWDER, LYOPHILIZED, FOR SOLUTION INTRAVENOUS at 03:16

## 2019-05-26 NOTE — PROGRESS NOTES
Bedside shift change report given to CARLA Kapadia (oncoming nurse) by Alejandro Arndt (offgoing nurse). Report included the following information SBAR, Kardex, MAR and Recent Results.

## 2019-05-26 NOTE — PROGRESS NOTES
[facility] ICU Progress Note    Admit Date: 2019  POD:  5 Day Post-Op    Procedure:  Procedure(s):  IMPELLA INSERTION RIGHT SUBCLAVIAN . TRANSESOPHAGEAL ECHOCARDIOGRAM BY DR. Lisa Clifton. Subjective:   Pt seen with Dr. Eugenia Espinoza RA. Off cardene CI 3.7    insulin gtt. Bival through impella. P54   Up in chair. No complaints. Turn down to P-3  Impella removal Tuesday     Objective:   Vitals:  Blood pressure (!) 179/94, pulse 79, temperature 97.7 °F (36.5 °C), resp. rate 11, height 5' 4\" (1.626 m), weight 122 lb 12.7 oz (55.7 kg), last menstrual period 2011, SpO2 95 %. Temp (24hrs), Av.7 °F (36.5 °C), Min:97.2 °F (36.2 °C), Max:98.4 °F (36.9 °C)    Hemodynamics:   CO: CO (l/min): 4 l/min   CI: CI (l/min/m2): 2.5 l/min/m2   CVP: CVP (mmHg): 0 mmHg (19 0800)   SVR: SVR (dyne*sec)/cm5: 1752 (dyne*sec)/cm5 (19 6626)   PAP Systolic: PAP Systolic: 16 (50/89/35 9810)   PAP Diastolic: PAP Diastolic: 4 (52/99/44 1008)   PVR:     SV02: SVO2 (%): (Unable to calibrate/drawback on PA catheter. MD aware.) (19)   SCV02:      EKG/Rhythm:  NSR 80s    Extubation Date / Time: 19    Oxygen Therapy:  Oxygen Therapy  O2 Sat (%): 95 % (19 1000)  Pulse via Oximetry: 80 beats per minute (19 1000)  O2 Device: Room air (19 0800)  O2 Flow Rate (L/min): 2 l/min (19 0200)  O2 Temperature: 39.2 °F (4 °C) (19 0600)  FIO2 (%): 40 % (19)    CXR:   CXR Results  (Last 48 hours)               19 0407  XR CHEST PORT Final result    Impression:  No significant change. Narrative:  INDICATION:  PAC placement. Heart failure. EXAM: CXR Portable. FINDINGS: Portable chest shows support lines/devices appear unchanged since   yesterday. There is no apparent pneumothorax. Lungs show no acute findings. Heart size is normal. There is no pulmonary edema.            19 0438  XR CHEST PORT Final result    Impression:  No acute cardiopulmonary disease radiographically. .  . Stable lines and tubes. Narrative:  INDICATION:  PAC placement        EXAM: Chest single view. COMPARISON: 5/24/2019. FINDINGS: A single frontal view of the chest at 0408 hours shows clear lungs. Impella device from the right subclavian approach is stable. PA catheter is   stable. Skin staples over the right upper lung field are stable with no   pneumothorax. The heart, mediastinum and pulmonary vasculature are stable . The   bony thorax is unremarkable for age. .                 Admission Weight: Last Weight   Weight: 125 lb (56.7 kg) Weight: 122 lb 12.7 oz (55.7 kg)     Intake / Output / Drain:  Current Shift: 05/26 0701 - 05/26 1900  In: 41.9 [I.V.:41.9]  Out: -   Last 24 hrs.:     Intake/Output Summary (Last 24 hours) at 5/26/2019 1026  Last data filed at 5/26/2019 0800  Gross per 24 hour   Intake 1395.41 ml   Output 3900 ml   Net -2504.59 ml       EXAM:  General:  NAD, alert                                                                                           Lungs:   Clear to auscultation bilaterally. Incision:  dsg cdi   Heart:  Regular rate and rhythm, S1, S2 normal, no murmur, click, rub or gallop. Abdomen:   Soft, non-tender. Bowel sounds normal. No masses,  No organomegaly. Extremities:  No edema. PPP. Neurologic:  Gross motor and sensory apparatus intact.      Labs:   Recent Labs     05/26/19  0704 05/26/19  0316   WBC  --  13.1*   HGB  --  9.3*   HCT  --  28.7*   PLT  --  170   NA  --  138   K  --  4.4   BUN  --  25*   CREA  --  0.90   GLU  --  147*   GLUCPOC 163*  --    INR  --  1.4*        Assessment:     Active Problems:    NSTEMI (non-ST elevated myocardial infarction) ([facility] (5/18/2019)      Chest pain (5/18/2019)      Overview: Added automatically from request for surgery [id number]      CHF (congestive heart failure), NYHA class IV, acute on chronic, systolic ([facility] [address] (3/70/1691)      Overview: Added automatically from request for surgery [id number]         Plan/Recommendations/Medical Decision Makin. Acute systolic heart failure (NYHA class IV on admission): Impella placed . Cont impella P6. On ancef for impella ppx. Cont bival through purge.  -improved some, lactate 0.6. NT proBNP 8900-improved some. AHF following. On dig, aldactone. Plan to wean impella 1 P level per day over the weekend -possibly remove impella on   2. Atelectasis: Encourage I/S, on RA. 3. Endocrinological derangement: On hydrocortisone -starting to wean dose per AHF/endocrine. Endocrine following. 4. Hx arthritis, fibromyalgia, chronic pain, DJD  5. GERD: on protonix  6. Hx stroke: pt reports stoke int he past, no deficits  7. COPD: Current smoker, smoking cessation education. Wean O2 for sats >92%, cont nebs, prn duoneb. 8. Anemia: received IV iron, monitor. 9. Hyperglycemia: Insulin gtt per endocrine recs. 10. Dispo: Cont ICU care, cont impella support -wean 1 P level per day.      Signed By: Kearney Primrose, PA
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
## 2019-05-26 NOTE — PROGRESS NOTES
Problem: Pressure Injury - Risk of  Goal: *Prevention of pressure injury  Description  Document Salvatore Scale and appropriate interventions in the flowsheet.   5/25/2019 8910 by Fredo Karimi RN  Outcome: Progressing Towards Goal  Note:   Pressure Injury Interventions:  Sensory Interventions: Assess changes in LOC, Check visual cues for pain  Moisture Interventions: Absorbent underpads, Check for incontinence Q2 hours and as needed  Activity Interventions: Increase time out of bed, PT/OT evaluation  Mobility Interventions: HOB 30 degrees or less, Pressure redistribution bed/mattress (bed type), PT/OT evaluation  Nutrition Interventions: Document food/fluid/supplement intake  Friction and Shear Interventions: Lift sheet, Minimize layers

## 2019-05-26 NOTE — PROGRESS NOTES
1945: Report received from Ferol Goldmann., RN. Bival drip verified. Pt sitting up in chair, visiting w/ family. On RA, sats WDL. Pt transitioned off insulin drip today, now ACHS. DTC consulted today. Impella @ P4, plan to wean 1 level per day, and d/c on Tuesday (5/28).     0316: Bival bag changed. 0320: Labs sent    3647: CXR at bedside. 0700: Unremarkable night. 0745: Bedside and Verbal shift change report given to Emerita Moody RN. Report included the following information SBAR, Intake/Output, MAR, Recent Results and Cardiac Rhythm NSR. Problem: Unstable angina/NSTEMI: Day 2  Goal: Off Pathway (Use only if patient is Off Pathway)  Outcome: Progressing Towards Goal  Note:   POD 7 s/p NSTEMI. Pt OOB w/ minimal assistance, no gait disturbances, and good safety awareness. Follows commands. Pt calm and cooperative, PRN klonopin for anxiety. HF team, palliative, Cards, and DTC following pt. Trending CBC & CMP + mg. Repleting electrolytes per protocol. Pt diuresed today, elevated proBNP. Hemodynamically stable, on no drips. NSR, rare ectopy. Impella at p4, weaning 1 level per day. Plan to d/c this Tuesday (5/28). Bival running through impella. Pt tolerating diet and educated on carbohydrates and their relation to BG. Pt verbalized understanding. Lungs clear, on RA. Sating >93%. Pt receiving morphine and roxicodone for pain. Pt verbalizes pain relief and is able to rest.    Goal: *Hemodynamically stable  Outcome: Progressing Towards Goal  Goal: *Optimal pain control at patient's stated goal  Outcome: Progressing Towards Goal  Goal: *Lungs clear or at baseline  Outcome: Progressing Towards Goal     Problem: Heart Failure: Day 3  Goal: Off Pathway (Use only if patient is Off Pathway)  Outcome: Progressing Towards Goal  Goal: *Oxygen saturation within defined limits  Outcome: Progressing Towards Goal  Note:   On RA, sats WDL.   Goal: *Hemodynamically stable  Outcome: Progressing Towards Goal  Goal: *Optimal pain control at patient's stated goal  Outcome: Progressing Towards Goal  Goal: *Anxiety reduced or absent  Outcome: Progressing Towards Goal  Note:   Pt calm and cooperative, PRN klonopin for anxiety. Goal: *Demonstrates progressive activity  Outcome: Progressing Towards Goal     Problem: Pressure Injury - Risk of  Goal: *Prevention of pressure injury  Description  Document Salvatore Scale and appropriate interventions in the flowsheet. Outcome: Progressing Towards Goal  Note:   Pressure Injury Interventions:  Sensory Interventions: Assess changes in LOC, Check visual cues for pain  Moisture Interventions: Absorbent underpads, Check for incontinence Q2 hours and as needed  Activity Interventions: Increase time out of bed, PT/OT evaluation  Mobility Interventions: HOB 30 degrees or less, Pressure redistribution bed/mattress (bed type), PT/OT evaluation  Nutrition Interventions: Document food/fluid/supplement intake  Friction and Shear Interventions: Lift sheet, Minimize layers    Problem: Falls - Risk of  Goal: *Absence of Falls  Description  Document Jared Fall Risk and appropriate interventions in the flowsheet.   Note:   Fall Risk Interventions:  Mobility Interventions: Communicate number of staff needed for ambulation/transfer, Patient to call before getting OOB, Strengthening exercises (ROM-active/passive)  Medication Interventions: Evaluate medications/consider consulting pharmacy, Patient to call before getting OOB, Teach patient to arise slowly  Elimination Interventions: Call light in reach, Patient to call for help with toileting needs, Stay With Me (per policy), Toileting schedule/hourly rounds  History of Falls Interventions: Door open when patient unattended, Room close to nurse's station, Evaluate medications/consider consulting pharmacy

## 2019-05-27 ENCOUNTER — APPOINTMENT (OUTPATIENT)
Dept: GENERAL RADIOLOGY | Age: 59
DRG: 215 | End: 2019-05-27
Attending: THORACIC SURGERY (CARDIOTHORACIC VASCULAR SURGERY)
Payer: MEDICARE

## 2019-05-27 LAB
ALBUMIN SERPL ELPH-MCNC: 2.6 G/DL (ref 2.9–4.4)
ALBUMIN SERPL-MCNC: 2.8 G/DL (ref 3.5–5)
ALBUMIN/GLOB SERPL: 0.8 {RATIO} (ref 1.1–2.2)
ALBUMIN/GLOB SERPL: 1.1 {RATIO} (ref 0.7–1.7)
ALP SERPL-CCNC: 106 U/L (ref 45–117)
ALPHA1 GLOB SERPL ELPH-MCNC: 0.3 G/DL (ref 0–0.4)
ALPHA2 GLOB SERPL ELPH-MCNC: 0.9 G/DL (ref 0.4–1)
ALT SERPL-CCNC: 12 U/L (ref 12–78)
ANION GAP SERPL CALC-SCNC: 7 MMOL/L (ref 5–15)
APTT PPP: 49.4 SEC (ref 22.1–32)
AST SERPL-CCNC: 28 U/L (ref 15–37)
B-GLOBULIN SERPL ELPH-MCNC: 0.7 G/DL (ref 0.7–1.3)
BILIRUB SERPL-MCNC: 0.3 MG/DL (ref 0.2–1)
BNP SERPL-MCNC: 8789 PG/ML
BUN SERPL-MCNC: 22 MG/DL (ref 6–20)
BUN/CREAT SERPL: 22 (ref 12–20)
CALCIUM SERPL-MCNC: 8.6 MG/DL (ref 8.5–10.1)
CHLORIDE SERPL-SCNC: 102 MMOL/L (ref 97–108)
CO2 SERPL-SCNC: 30 MMOL/L (ref 21–32)
CREAT SERPL-MCNC: 1.02 MG/DL (ref 0.55–1.02)
DIGOXIN SERPL-MCNC: 1.1 NG/ML (ref 0.9–2)
ERYTHROCYTE [DISTWIDTH] IN BLOOD BY AUTOMATED COUNT: 13.7 % (ref 11.5–14.5)
GAMMA GLOB SERPL ELPH-MCNC: 0.7 G/DL (ref 0.4–1.8)
GLOBULIN SER CALC-MCNC: 3.5 G/DL (ref 2–4)
GLOBULIN SER-MCNC: 2.6 G/DL (ref 2.2–3.9)
GLUCOSE BLD STRIP.AUTO-MCNC: 129 MG/DL (ref 65–100)
GLUCOSE BLD STRIP.AUTO-MCNC: 161 MG/DL (ref 65–100)
GLUCOSE BLD STRIP.AUTO-MCNC: 180 MG/DL (ref 65–100)
GLUCOSE BLD STRIP.AUTO-MCNC: 187 MG/DL (ref 65–100)
GLUCOSE SERPL-MCNC: 172 MG/DL (ref 65–100)
HCT VFR BLD AUTO: 31.5 % (ref 35–47)
HGB BLD-MCNC: 10.2 G/DL (ref 11.5–16)
IGA SERPL-MCNC: 216 MG/DL (ref 87–352)
IGG SERPL-MCNC: 711 MG/DL (ref 700–1600)
IGM SERPL-MCNC: 80 MG/DL (ref 26–217)
INR PPP: 1.3 (ref 0.9–1.1)
INTERPRETATION SERPL IEP-IMP: ABNORMAL
KAPPA LC FREE SER-MCNC: 35.2 MG/L (ref 3.3–19.4)
KAPPA LC FREE/LAMBDA FREE SER: 1.25 {RATIO} (ref 0.26–1.65)
LACTATE SERPL-SCNC: 0.9 MMOL/L (ref 0.4–2)
LAMBDA LC FREE SERPL-MCNC: 28.1 MG/L (ref 5.7–26.3)
LDH SERPL L TO P-CCNC: 489 U/L (ref 81–246)
M PROTEIN SERPL ELPH-MCNC: ABNORMAL G/DL
MAGNESIUM SERPL-MCNC: 1.9 MG/DL (ref 1.6–2.4)
MCH RBC QN AUTO: 28.7 PG (ref 26–34)
MCHC RBC AUTO-ENTMCNC: 32.4 G/DL (ref 30–36.5)
MCV RBC AUTO: 88.5 FL (ref 80–99)
NRBC # BLD: 0.05 K/UL (ref 0–0.01)
NRBC BLD-RTO: 0.3 PER 100 WBC
PLATELET # BLD AUTO: 180 K/UL (ref 150–400)
PMV BLD AUTO: 9.8 FL (ref 8.9–12.9)
POTASSIUM SERPL-SCNC: 4.1 MMOL/L (ref 3.5–5.1)
PROT SERPL-MCNC: 5.2 G/DL (ref 6–8.5)
PROT SERPL-MCNC: 6.3 G/DL (ref 6.4–8.2)
PROTHROMBIN TIME: 13.2 SEC (ref 9–11.1)
RBC # BLD AUTO: 3.56 M/UL (ref 3.8–5.2)
SERVICE CMNT-IMP: ABNORMAL
SODIUM SERPL-SCNC: 139 MMOL/L (ref 136–145)
T4 FREE SERPL-MCNC: 1.2 NG/DL (ref 0.8–1.5)
T4 FREE SERPL-MCNC: 1.3 NG/DL (ref 0.8–1.5)
THERAPEUTIC RANGE,PTTT: ABNORMAL SECS (ref 58–77)
WBC # BLD AUTO: 15 K/UL (ref 3.6–11)

## 2019-05-27 PROCEDURE — 74011250637 HC RX REV CODE- 250/637: Performed by: INTERNAL MEDICINE

## 2019-05-27 PROCEDURE — 74011636637 HC RX REV CODE- 636/637: Performed by: INTERNAL MEDICINE

## 2019-05-27 PROCEDURE — 74011250637 HC RX REV CODE- 250/637: Performed by: NURSE PRACTITIONER

## 2019-05-27 PROCEDURE — 80053 COMPREHEN METABOLIC PANEL: CPT

## 2019-05-27 PROCEDURE — 85027 COMPLETE CBC AUTOMATED: CPT

## 2019-05-27 PROCEDURE — 74011250636 HC RX REV CODE- 250/636: Performed by: NURSE PRACTITIONER

## 2019-05-27 PROCEDURE — 74011000250 HC RX REV CODE- 250: Performed by: HOSPITALIST

## 2019-05-27 PROCEDURE — 94640 AIRWAY INHALATION TREATMENT: CPT

## 2019-05-27 PROCEDURE — 83605 ASSAY OF LACTIC ACID: CPT

## 2019-05-27 PROCEDURE — 74011000258 HC RX REV CODE- 258: Performed by: NURSE PRACTITIONER

## 2019-05-27 PROCEDURE — 80162 ASSAY OF DIGOXIN TOTAL: CPT

## 2019-05-27 PROCEDURE — 86900 BLOOD TYPING SEROLOGIC ABO: CPT

## 2019-05-27 PROCEDURE — 85610 PROTHROMBIN TIME: CPT

## 2019-05-27 PROCEDURE — 36415 COLL VENOUS BLD VENIPUNCTURE: CPT

## 2019-05-27 PROCEDURE — 83615 LACTATE (LD) (LDH) ENZYME: CPT

## 2019-05-27 PROCEDURE — 71045 X-RAY EXAM CHEST 1 VIEW: CPT

## 2019-05-27 PROCEDURE — 85730 THROMBOPLASTIN TIME PARTIAL: CPT

## 2019-05-27 PROCEDURE — 83880 ASSAY OF NATRIURETIC PEPTIDE: CPT

## 2019-05-27 PROCEDURE — 83735 ASSAY OF MAGNESIUM: CPT

## 2019-05-27 PROCEDURE — 74011250636 HC RX REV CODE- 250/636: Performed by: INTERNAL MEDICINE

## 2019-05-27 PROCEDURE — 65610000003 HC RM ICU SURGICAL

## 2019-05-27 PROCEDURE — 99291 CRITICAL CARE FIRST HOUR: CPT | Performed by: INTERNAL MEDICINE

## 2019-05-27 PROCEDURE — 84439 ASSAY OF FREE THYROXINE: CPT

## 2019-05-27 PROCEDURE — 74011250637 HC RX REV CODE- 250/637: Performed by: HOSPITALIST

## 2019-05-27 PROCEDURE — 82962 GLUCOSE BLOOD TEST: CPT

## 2019-05-27 RX ORDER — CARVEDILOL 12.5 MG/1
12.5 TABLET ORAL 2 TIMES DAILY WITH MEALS
Status: DISCONTINUED | OUTPATIENT
Start: 2019-05-27 | End: 2019-05-29

## 2019-05-27 RX ORDER — INSULIN GLARGINE 100 [IU]/ML
8 INJECTION, SOLUTION SUBCUTANEOUS DAILY
Status: DISCONTINUED | OUTPATIENT
Start: 2019-05-27 | End: 2019-06-04 | Stop reason: HOSPADM

## 2019-05-27 RX ADMIN — HYDROCORTISONE SODIUM SUCCINATE 25 MG: 100 INJECTION, POWDER, FOR SOLUTION INTRAMUSCULAR; INTRAVENOUS at 04:47

## 2019-05-27 RX ADMIN — Medication 10 ML: at 21:17

## 2019-05-27 RX ADMIN — HYDRALAZINE HYDROCHLORIDE 25 MG: 25 TABLET, FILM COATED ORAL at 15:28

## 2019-05-27 RX ADMIN — HYDROCORTISONE SODIUM SUCCINATE 25 MG: 100 INJECTION, POWDER, FOR SOLUTION INTRAMUSCULAR; INTRAVENOUS at 12:28

## 2019-05-27 RX ADMIN — CHLORHEXIDINE GLUCONATE 15 ML: 1.2 RINSE ORAL at 08:59

## 2019-05-27 RX ADMIN — SPIRONOLACTONE 25 MG: 25 TABLET ORAL at 08:52

## 2019-05-27 RX ADMIN — BUDESONIDE 250 MCG: 0.25 INHALANT RESPIRATORY (INHALATION) at 18:21

## 2019-05-27 RX ADMIN — Medication 400 MG: at 08:52

## 2019-05-27 RX ADMIN — INSULIN LISPRO 3 UNITS: 100 INJECTION, SOLUTION INTRAVENOUS; SUBCUTANEOUS at 07:30

## 2019-05-27 RX ADMIN — INSULIN LISPRO 3 UNITS: 100 INJECTION, SOLUTION INTRAVENOUS; SUBCUTANEOUS at 12:28

## 2019-05-27 RX ADMIN — DIGOXIN 0.25 MG: 250 TABLET ORAL at 08:52

## 2019-05-27 RX ADMIN — CHLORHEXIDINE GLUCONATE 15 ML: 1.2 RINSE ORAL at 17:49

## 2019-05-27 RX ADMIN — POLYETHYLENE GLYCOL 3350 17 G: 17 POWDER, FOR SOLUTION ORAL at 08:52

## 2019-05-27 RX ADMIN — INSULIN GLARGINE 8 UNITS: 100 INJECTION, SOLUTION SUBCUTANEOUS at 15:09

## 2019-05-27 RX ADMIN — FUROSEMIDE 40 MG: 40 TABLET ORAL at 08:52

## 2019-05-27 RX ADMIN — Medication 2 G: at 07:22

## 2019-05-27 RX ADMIN — OXYCODONE HYDROCHLORIDE AND ACETAMINOPHEN 1 TABLET: 5; 325 TABLET ORAL at 22:10

## 2019-05-27 RX ADMIN — SENNOSIDES,DOCUSATE SODIUM 1 TABLET: 8.6; 5 TABLET, FILM COATED ORAL at 08:52

## 2019-05-27 RX ADMIN — Medication 10 ML: at 14:00

## 2019-05-27 RX ADMIN — OXYCODONE HYDROCHLORIDE AND ACETAMINOPHEN 1 TABLET: 5; 325 TABLET ORAL at 08:54

## 2019-05-27 RX ADMIN — PANTOPRAZOLE SODIUM 40 MG: 40 TABLET, DELAYED RELEASE ORAL at 06:49

## 2019-05-27 RX ADMIN — ISOSORBIDE DINITRATE 20 MG: 20 TABLET ORAL at 21:15

## 2019-05-27 RX ADMIN — OXYCODONE HYDROCHLORIDE AND ACETAMINOPHEN 1 TABLET: 5; 325 TABLET ORAL at 15:18

## 2019-05-27 RX ADMIN — ASPIRIN 81 MG: 81 TABLET ORAL at 08:52

## 2019-05-27 RX ADMIN — CLONAZEPAM 0.5 MG: 0.5 TABLET ORAL at 22:10

## 2019-05-27 RX ADMIN — Medication 2 G: at 15:28

## 2019-05-27 RX ADMIN — CARVEDILOL 12.5 MG: 12.5 TABLET, FILM COATED ORAL at 17:48

## 2019-05-27 RX ADMIN — BUDESONIDE 250 MCG: 0.25 INHALANT RESPIRATORY (INHALATION) at 07:11

## 2019-05-27 RX ADMIN — ISOSORBIDE DINITRATE 20 MG: 20 TABLET ORAL at 08:52

## 2019-05-27 RX ADMIN — CARVEDILOL 6.25 MG: 6.25 TABLET, FILM COATED ORAL at 07:22

## 2019-05-27 RX ADMIN — HYDRALAZINE HYDROCHLORIDE 25 MG: 25 TABLET, FILM COATED ORAL at 21:15

## 2019-05-27 RX ADMIN — POTASSIUM CHLORIDE 40 MEQ: 750 TABLET, FILM COATED, EXTENDED RELEASE ORAL at 21:15

## 2019-05-27 RX ADMIN — ISOSORBIDE DINITRATE 20 MG: 20 TABLET ORAL at 15:29

## 2019-05-27 RX ADMIN — POTASSIUM CHLORIDE 40 MEQ: 750 TABLET, FILM COATED, EXTENDED RELEASE ORAL at 08:52

## 2019-05-27 RX ADMIN — POTASSIUM CHLORIDE 40 MEQ: 750 TABLET, FILM COATED, EXTENDED RELEASE ORAL at 15:28

## 2019-05-27 RX ADMIN — BIVALIRUDIN 12.2 ML/HR: 250 INJECTION, POWDER, LYOPHILIZED, FOR SOLUTION INTRAVENOUS at 14:26

## 2019-05-27 RX ADMIN — PRAVASTATIN SODIUM 40 MG: 40 TABLET ORAL at 21:15

## 2019-05-27 RX ADMIN — HYDROCORTISONE SODIUM SUCCINATE 25 MG: 100 INJECTION, POWDER, FOR SOLUTION INTRAMUSCULAR; INTRAVENOUS at 21:17

## 2019-05-27 RX ADMIN — SPIRONOLACTONE 25 MG: 25 TABLET ORAL at 17:49

## 2019-05-27 RX ADMIN — HYDRALAZINE HYDROCHLORIDE 25 MG: 25 TABLET, FILM COATED ORAL at 08:52

## 2019-05-27 RX ADMIN — Medication 10 ML: at 06:59

## 2019-05-27 RX ADMIN — INSULIN LISPRO 3 UNITS: 100 INJECTION, SOLUTION INTRAVENOUS; SUBCUTANEOUS at 17:41

## 2019-05-27 NOTE — PROGRESS NOTES
[facility] ICU Progress Note    Admit Date: 2019  POD:  6 Day Post-Op    Procedure:  Procedure(s):  IMPELLA INSERTION RIGHT SUBCLAVIAN . TRANSESOPHAGEAL ECHOCARDIOGRAM BY  [address] [facility]. Subjective:   Pt seen with Dr. Kimmy Gregg RA. Off cardene CI 3.7  Bival through purge. P3 1.6 lpm purge 12  Up in chair. No complaints. Impella removal Tuesday     Objective:   Vitals:  Blood pressure 119/58, pulse 94, temperature 98.3 °F (36.8 °C), resp. rate 22, height 5' 4\" (1.626 m), weight 120 lb 9.6 oz (54.7 kg), last menstrual period 2011, SpO2 98 %. Temp (24hrs), Av.3 °F (36.8 °C), Min:98 °F (36.7 °C), Max:98.6 °F (37 °C)    Hemodynamics:   CO: CO (l/min): 4.4 l/min   CI: CI (l/min/m2): 2.7 l/min/m2   CVP: CVP (mmHg): 5 mmHg (19)   SVR: SVR (dyne*sec)/cm5: 1347 (dyne*sec)/cm5 (19 2634)   PAP Systolic: PAP Systolic: 31 (85/69/69 0816)   PAP Diastolic: PAP Diastolic: 20 ( 2588)   PVR:     SV02: SVO2 (%): (Unable to calibrate/drawback on PA catheter. MD aware.) (19)   SCV02:      EKG/Rhythm:  NSR 80s    Extubation Date / Time: 19    Oxygen Therapy:  Oxygen Therapy  O2 Sat (%): 98 % (19)  Pulse via Oximetry: 91 beats per minute (19)  O2 Device: Room air (19 08)  O2 Flow Rate (L/min): 2 l/min (19 0200)  O2 Temperature: 39.2 °F (4 °C) (19)  FIO2 (%): 40 % (19)    CXR:   CXR Results  (Last 48 hours)               19 0425  XR CHEST PORT Final result    Impression:  No significant change. Narrative:  EXAM:  XR CHEST PORT. INDICATION: PAC placement. COMPARISON: 2019. FINDINGS:    A portable AP radiograph of the chest was obtained at 0343 hours. The right   subclavian Impella device is unchanged in position. Lines and tubes: The patient is on a cardiac monitor. The right jugular   Groom-Krishan catheter with tip over the main pulmonary artery is unchanged. Lungs: The lungs are clear. Pleura: There is no pneumothorax or pleural effusion. Mediastinum: The cardiac and mediastinal contours and pulmonary vascularity are   normal.   Bones and soft tissues: The bones and soft tissues are grossly within normal   limits. There are surgical clips and staples in the right infraclavicular   region. 05/26/19 0407  XR CHEST PORT Final result    Impression:  No significant change. Narrative:  INDICATION:  PAC placement. Heart failure. EXAM: CXR Portable. FINDINGS: Portable chest shows support lines/devices appear unchanged since   yesterday. There is no apparent pneumothorax. Lungs show no acute findings. Heart size is normal. There is no pulmonary edema. Admission Weight: Last Weight   Weight: 125 lb (56.7 kg) Weight: 120 lb 9.6 oz (54.7 kg)     Intake / Output / Drain:  Current Shift: No intake/output data recorded. Last 24 hrs.:     Intake/Output Summary (Last 24 hours) at 5/27/2019 0951  Last data filed at 5/27/2019 0700  Gross per 24 hour   Intake 1452.92 ml   Output 3900 ml   Net -2447.08 ml       EXAM:  General:  NAD, alert                                                                                           Lungs:   Clear to auscultation bilaterally. Incision:  dsg cdi   Heart:  Regular rate and rhythm, S1, S2 normal, no murmur, click, rub or gallop. Abdomen:   Soft, non-tender. Bowel sounds normal. No masses,  No organomegaly. Extremities:  No edema. PPP. Neurologic:  Gross motor and sensory apparatus intact.      Labs:   Recent Labs     05/27/19  0725 05/27/19  0438 05/27/19  0434   WBC  --  15.0*  --    HGB  --  10.2*  --    HCT  --  31.5*  --    PLT  --  180  --    NA  --   --  139   K  --   --  4.1   BUN  --   --  22*   CREA  --   --  1.02   GLU  --   --  172*   GLUCPOC 161*  --   --    INR  --   --  1.3*        Assessment:     Active Problems:    NSTEMI (non-ST elevated myocardial infarction) ([facility] [address] (5/18/2019)      Chest pain (2019)      Overview: Added automatically from request for surgery [id number]      CHF (congestive heart failure), NYHA class IV, acute on chronic, systolic ([facility] [address] ()      Overview: Added automatically from request for surgery [id number]         Plan/Recommendations/Medical Decision Makin. Acute systolic heart failure (NYHA class IV on admission): Impella placed . Cont impella P6. On ancef for impella ppx. Cont bival through purge.  -improved some, lactate 0.6. NT proBNP 8900-improved some. AHF following. On dig, aldactone. Plan to wean impella 1 P level per day over the weekend -possibly remove impella on   2. Atelectasis: Encourage I/S, on RA. 3. Endocrinological derangement: On hydrocortisone -starting to wean dose per AHF/endocrine. Endocrine following. 4. Hx arthritis, fibromyalgia, chronic pain, DJD  5. GERD: on protonix  6. Hx stroke: pt reports stoke int he past, no deficits  7. COPD: Current smoker, smoking cessation education. Wean O2 for sats >92%, cont nebs, prn duoneb. 8. Anemia: received IV iron, monitor. 9. Hyperglycemia: Insulin gtt per endocrine recs. 10. Dispo: Cont ICU care, cont impella support -wean 1 P level per day.      Signed By: CONNIE Andrade

## 2019-05-27 NOTE — PROGRESS NOTES
NYHA class IV acute systolic heart failure  Cardiogenic shock       Looks good this am    Hgb and platelets look good    PTT therapeutic    Creatinine normal    Bilirubin and other LFTs look good    LDH and lactic acid look good    NT pro-BNP continues to improve    Planning for Impella removal in the am    Discussed issues with HF team     CXR - clear lung fields    Impella - flow 1.4 L/min @ P-2      Intake/Output Summary (Last 24 hours) at 5/27/2019 1419  Last data filed at 5/27/2019 1330  Gross per 24 hour   Intake 1313.02 ml   Output 3650 ml   Net -2336.98 ml     Risk of morbidity and mortality - high  Medical decision making - high complexity    Total critical care time - 30 minutes (CPT 29485)

## 2019-05-27 NOTE — PROGRESS NOTES
0800: Assumed care of patient from off going nurse Sen Chaney RN  Patient up to chair  0830: Received wrong tray for breakfast. Ordered new tray to come. 1000: Patient ate all of breakfast  1200: Multiple voids after PO diuretics. 1300: Dr. Francisco Menchaca at bedside, updated on patient status. Adding long acting insulin dose. 1500: Patient signed consent for planned impella removal tomorrow. Dr. Myrna Stroud was in and discussed plan of care with her.   1600: Reassessment is unchanged   Bedside and Verbal shift change report given to Vicky Buckner (oncoming nurse) by Mau Arceo RN (offgoing nurse). Report included the following information SBAR.
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
## 2019-05-27 NOTE — PROGRESS NOTES
2000: Received report from [facility]. Pt in bed on telephone. VSS, Impella device remains at P3 and placement signal is unknown, MD aware.     2300: Assisted pt to bedside commode for line management, pt voided 900 ml clear/yellow urine without difficulty. 0400: Assisted pt to bedside commode for line management, pt voided 900 ml clear/yellow urine without difficulty. Uneventful night, pt slept well. No issues with Impella device. 0800: Bedside and Verbal shift change report given to CARLA Mc (oncoming nurse) by 800 [facility] (offgoing nurse). Report included the following information SBAR, Kardex, ED Summary, OR Summary, Intake/Output, MAR, Med Rec Status and Cardiac Rhythm NSR.                            Problem: Patient Education: Go to Patient Education Activity  Goal: Patient/Family Education  Outcome: Progressing Towards Goal     Problem: Cath Lab Procedures: Discharge Outcomes  Goal: *Stable cardiac rhythm  Outcome: Progressing Towards Goal  Goal: *Hemodynamically stable  Outcome: Progressing Towards Goal  Goal: *Optimal pain control at patient's stated goal  Outcome: Progressing Towards Goal  Goal: *Pulses palpable, skin color within defined limits, skin temperature warm  Outcome: Progressing Towards Goal  Goal: *Lungs clear or at baseline  Outcome: Progressing Towards Goal  Goal: *Demonstrates ability to perform prescribed activity without shortness of breath or discomfort  Outcome: Progressing Towards Goal  Goal: *Verbalizes home exercise program, activity guidelines, cardiac precautions  Outcome: Progressing Towards Goal  Goal: *Verbalizes understanding and describes prescribed diet  Outcome: Progressing Towards Goal  Goal: *Verbalizes understanding and describes medication purposes and frequencies  Outcome: Progressing Towards Goal  Goal: *Identifies cardiac risk factors  Outcome: Progressing Towards Goal  Goal: *No signs and symptoms of infection or wound complications  Outcome: Progressing Towards Goal  Goal: *Anxiety reduced or absent  Outcome: Progressing Towards Goal  Goal: *Describes available resources and support systems  Outcome: Progressing Towards Goal     Problem: Unstable Angina/NSTEMI: Discharge Outcomes  Goal: *Hemodynamically stable  Outcome: Progressing Towards Goal  Goal: *Stable cardiac rhythm  Outcome: Progressing Towards Goal  Goal: *Lungs clear or at baseline  Outcome: Progressing Towards Goal  Goal: *Optimal pain control at patient's stated goal  Outcome: Progressing Towards Goal  Goal: *Identifies cardiac risk factors  Outcome: Progressing Towards Goal  Goal: *Verbalizes home exercise program, activity guidelines, cardiac precautions  Outcome: Progressing Towards Goal  Goal: *Verbalizes understanding and describes prescribed diet  Outcome: Progressing Towards Goal  Goal: *Verbalizes name, dosage, time, side effects, and number of days to continue medications  Outcome: Progressing Towards Goal  Goal: *Anxiety reduced or absent  Outcome: Progressing Towards Goal  Goal: *Understands and describes signs and symptoms to report to providers(Stroke Metric)  Outcome: Progressing Towards Goal  Goal: *Describes follow-up/return visits to physicians  Outcome: Progressing Towards Goal  Goal: *Describes available resources and support systems  Outcome: Progressing Towards Goal  Goal: *Influenza immunization  Outcome: Progressing Towards Goal  Goal: *Pneumococcal immunization  Outcome: Progressing Towards Goal  Goal: *Describes smoking cessation resources  Outcome: Progressing Towards Goal     Problem: Heart Failure: Discharge Outcomes  Goal: *Demonstrates ability to perform prescribed activity without shortness of breath or discomfort  Outcome: Progressing Towards Goal  Goal: *Verbalizes understanding and describes prescribed diet  Outcome: Progressing Towards Goal  Goal: *Describes available resources and support systems  Description  (eg: Home Health, Palliative Care, Advanced Medical Directive)  Outcome: Progressing Towards Goal  Goal: *Describes smoking cessation resources  Outcome: Progressing Towards Goal  Goal: *Describes importance of continuing daily weights and changes to report to physician  Outcome: Progressing Towards Goal     Problem: Falls - Risk of  Goal: *Absence of Falls  Description  Document Clintt President Fall Risk and appropriate interventions in the flowsheet. Outcome: Progressing Towards Goal     Problem: Patient Education: Go to Patient Education Activity  Goal: Patient/Family Education  Outcome: Progressing Towards Goal     Problem: Pressure Injury - Risk of  Goal: *Prevention of pressure injury  Description  Document Salvatore Scale and appropriate interventions in the flowsheet.   Outcome: Progressing Towards Goal     Problem: Patient Education: Go to Patient Education Activity  Goal: Patient/Family Education  Outcome: Progressing Towards Goal

## 2019-05-28 ENCOUNTER — APPOINTMENT (OUTPATIENT)
Dept: GENERAL RADIOLOGY | Age: 59
DRG: 215 | End: 2019-05-28
Attending: THORACIC SURGERY (CARDIOTHORACIC VASCULAR SURGERY)
Payer: MEDICARE

## 2019-05-28 ENCOUNTER — APPOINTMENT (OUTPATIENT)
Dept: GENERAL RADIOLOGY | Age: 59
DRG: 215 | End: 2019-05-28
Attending: INTERNAL MEDICINE
Payer: MEDICARE

## 2019-05-28 ENCOUNTER — ANESTHESIA (OUTPATIENT)
Dept: CARDIOTHORACIC SURGERY | Age: 59
DRG: 215 | End: 2019-05-28
Payer: MEDICARE

## 2019-05-28 ENCOUNTER — APPOINTMENT (OUTPATIENT)
Dept: NON INVASIVE DIAGNOSTICS | Age: 59
DRG: 215 | End: 2019-05-28
Payer: MEDICARE

## 2019-05-28 LAB
ALBUMIN SERPL-MCNC: 2.8 G/DL (ref 3.5–5)
ALBUMIN/GLOB SERPL: 0.8 {RATIO} (ref 1.1–2.2)
ALP SERPL-CCNC: 113 U/L (ref 45–117)
ALT SERPL-CCNC: 10 U/L (ref 12–78)
ANION GAP SERPL CALC-SCNC: 5 MMOL/L (ref 5–15)
APTT PPP: 48.3 SEC (ref 22.1–32)
AST SERPL-CCNC: 27 U/L (ref 15–37)
BILIRUB SERPL-MCNC: 0.3 MG/DL (ref 0.2–1)
BNP SERPL-MCNC: 6403 PG/ML
BUN SERPL-MCNC: 26 MG/DL (ref 6–20)
BUN/CREAT SERPL: 24 (ref 12–20)
CALCIUM SERPL-MCNC: 8.6 MG/DL (ref 8.5–10.1)
CHLORIDE SERPL-SCNC: 102 MMOL/L (ref 97–108)
CO2 SERPL-SCNC: 31 MMOL/L (ref 21–32)
CREAT SERPL-MCNC: 1.08 MG/DL (ref 0.55–1.02)
DIGOXIN SERPL-MCNC: 1.5 NG/ML (ref 0.9–2)
ECHO LV INTERNAL DIMENSION DIASTOLIC: 3.11 CM (ref 3.9–5.3)
ECHO LV INTERNAL DIMENSION SYSTOLIC: 2.19 CM
ERYTHROCYTE [DISTWIDTH] IN BLOOD BY AUTOMATED COUNT: 13.9 % (ref 11.5–14.5)
GLOBULIN SER CALC-MCNC: 3.3 G/DL (ref 2–4)
GLUCOSE BLD STRIP.AUTO-MCNC: 133 MG/DL (ref 65–100)
GLUCOSE BLD STRIP.AUTO-MCNC: 156 MG/DL (ref 65–100)
GLUCOSE BLD STRIP.AUTO-MCNC: 163 MG/DL (ref 65–100)
GLUCOSE BLD STRIP.AUTO-MCNC: 279 MG/DL (ref 65–100)
GLUCOSE SERPL-MCNC: 135 MG/DL (ref 65–100)
HCT VFR BLD AUTO: 31.6 % (ref 35–47)
HGB BLD-MCNC: 10.2 G/DL (ref 11.5–16)
INR PPP: 1.3 (ref 0.9–1.1)
LACTATE SERPL-SCNC: 1.1 MMOL/L (ref 0.4–2)
LDH SERPL L TO P-CCNC: 482 U/L (ref 81–246)
MCH RBC QN AUTO: 28.7 PG (ref 26–34)
MCHC RBC AUTO-ENTMCNC: 32.3 G/DL (ref 30–36.5)
MCV RBC AUTO: 89 FL (ref 80–99)
NRBC # BLD: 0.03 K/UL (ref 0–0.01)
NRBC BLD-RTO: 0.2 PER 100 WBC
PLATELET # BLD AUTO: 178 K/UL (ref 150–400)
PMV BLD AUTO: 10 FL (ref 8.9–12.9)
POTASSIUM SERPL-SCNC: 4.4 MMOL/L (ref 3.5–5.1)
PROCALCITONIN SERPL-MCNC: <0.1 NG/ML
PROT SERPL-MCNC: 6.1 G/DL (ref 6.4–8.2)
PROTHROMBIN TIME: 13 SEC (ref 9–11.1)
RBC # BLD AUTO: 3.55 M/UL (ref 3.8–5.2)
SERVICE CMNT-IMP: ABNORMAL
SODIUM SERPL-SCNC: 138 MMOL/L (ref 136–145)
T4 FREE SERPL-MCNC: 1.1 NG/DL (ref 0.8–1.5)
THERAPEUTIC RANGE,PTTT: ABNORMAL SECS (ref 58–77)
WBC # BLD AUTO: 17.3 K/UL (ref 3.6–11)

## 2019-05-28 PROCEDURE — 77030011640 HC PAD GRND REM COVD -A: Performed by: THORACIC SURGERY (CARDIOTHORACIC VASCULAR SURGERY)

## 2019-05-28 PROCEDURE — 74011250636 HC RX REV CODE- 250/636: Performed by: INTERNAL MEDICINE

## 2019-05-28 PROCEDURE — 71045 X-RAY EXAM CHEST 1 VIEW: CPT

## 2019-05-28 PROCEDURE — 84439 ASSAY OF FREE THYROXINE: CPT

## 2019-05-28 PROCEDURE — 80162 ASSAY OF DIGOXIN TOTAL: CPT

## 2019-05-28 PROCEDURE — 74011250637 HC RX REV CODE- 250/637: Performed by: INTERNAL MEDICINE

## 2019-05-28 PROCEDURE — 74011250637 HC RX REV CODE- 250/637: Performed by: NURSE PRACTITIONER

## 2019-05-28 PROCEDURE — 76060000032 HC ANESTHESIA 0.5 TO 1 HR: Performed by: THORACIC SURGERY (CARDIOTHORACIC VASCULAR SURGERY)

## 2019-05-28 PROCEDURE — 74011636637 HC RX REV CODE- 636/637: Performed by: INTERNAL MEDICINE

## 2019-05-28 PROCEDURE — 74011250636 HC RX REV CODE- 250/636: Performed by: NURSE PRACTITIONER

## 2019-05-28 PROCEDURE — 77030008467 HC STPLR SKN COVD -B: Performed by: THORACIC SURGERY (CARDIOTHORACIC VASCULAR SURGERY)

## 2019-05-28 PROCEDURE — 74011250636 HC RX REV CODE- 250/636

## 2019-05-28 PROCEDURE — 77030014008 HC SPNG HEMSTAT J&J -C: Performed by: THORACIC SURGERY (CARDIOTHORACIC VASCULAR SURGERY)

## 2019-05-28 PROCEDURE — 77030008027: Performed by: THORACIC SURGERY (CARDIOTHORACIC VASCULAR SURGERY)

## 2019-05-28 PROCEDURE — 83615 LACTATE (LD) (LDH) ENZYME: CPT

## 2019-05-28 PROCEDURE — 74011000250 HC RX REV CODE- 250: Performed by: HOSPITALIST

## 2019-05-28 PROCEDURE — 77030027876 HC STPLR ENDOSC FLX PWR J&J -G1: Performed by: THORACIC SURGERY (CARDIOTHORACIC VASCULAR SURGERY)

## 2019-05-28 PROCEDURE — 85027 COMPLETE CBC AUTOMATED: CPT

## 2019-05-28 PROCEDURE — 77030011220 HC DEV ELECSURG COVD -B: Performed by: THORACIC SURGERY (CARDIOTHORACIC VASCULAR SURGERY)

## 2019-05-28 PROCEDURE — 74011000258 HC RX REV CODE- 258: Performed by: THORACIC SURGERY (CARDIOTHORACIC VASCULAR SURGERY)

## 2019-05-28 PROCEDURE — 74011250637 HC RX REV CODE- 250/637: Performed by: HOSPITALIST

## 2019-05-28 PROCEDURE — 77030010516 HC APPL HEMA CLP TELE -B: Performed by: THORACIC SURGERY (CARDIOTHORACIC VASCULAR SURGERY)

## 2019-05-28 PROCEDURE — 02PA3RZ REMOVAL OF SHORT-TERM EXTERNAL HEART ASSIST SYSTEM FROM HEART, PERCUTANEOUS APPROACH: ICD-10-PCS | Performed by: THORACIC SURGERY (CARDIOTHORACIC VASCULAR SURGERY)

## 2019-05-28 PROCEDURE — 93308 TTE F-UP OR LMTD: CPT

## 2019-05-28 PROCEDURE — 82962 GLUCOSE BLOOD TEST: CPT

## 2019-05-28 PROCEDURE — 74011250636 HC RX REV CODE- 250/636: Performed by: THORACIC SURGERY (CARDIOTHORACIC VASCULAR SURGERY)

## 2019-05-28 PROCEDURE — 76010000112 HC CV SURG 0.5 TO 1 HR: Performed by: THORACIC SURGERY (CARDIOTHORACIC VASCULAR SURGERY)

## 2019-05-28 PROCEDURE — 77030018836 HC SOL IRR NACL ICUM -A: Performed by: THORACIC SURGERY (CARDIOTHORACIC VASCULAR SURGERY)

## 2019-05-28 PROCEDURE — 83605 ASSAY OF LACTIC ACID: CPT

## 2019-05-28 PROCEDURE — 74011000250 HC RX REV CODE- 250: Performed by: THORACIC SURGERY (CARDIOTHORACIC VASCULAR SURGERY)

## 2019-05-28 PROCEDURE — 65610000003 HC RM ICU SURGICAL

## 2019-05-28 PROCEDURE — 84145 PROCALCITONIN (PCT): CPT

## 2019-05-28 PROCEDURE — 85610 PROTHROMBIN TIME: CPT

## 2019-05-28 PROCEDURE — 83880 ASSAY OF NATRIURETIC PEPTIDE: CPT

## 2019-05-28 PROCEDURE — 85730 THROMBOPLASTIN TIME PARTIAL: CPT

## 2019-05-28 PROCEDURE — 80053 COMPREHEN METABOLIC PANEL: CPT

## 2019-05-28 PROCEDURE — 77030031139 HC SUT VCRL2 J&J -A: Performed by: THORACIC SURGERY (CARDIOTHORACIC VASCULAR SURGERY)

## 2019-05-28 PROCEDURE — 36415 COLL VENOUS BLD VENIPUNCTURE: CPT

## 2019-05-28 PROCEDURE — 74011000250 HC RX REV CODE- 250

## 2019-05-28 PROCEDURE — 77030038079 HC RELD STPLR ENDOSC J&J -G: Performed by: THORACIC SURGERY (CARDIOTHORACIC VASCULAR SURGERY)

## 2019-05-28 PROCEDURE — 94640 AIRWAY INHALATION TREATMENT: CPT

## 2019-05-28 PROCEDURE — C1757 CATH, THROMBECTOMY/EMBOLECT: HCPCS | Performed by: THORACIC SURGERY (CARDIOTHORACIC VASCULAR SURGERY)

## 2019-05-28 RX ORDER — SODIUM CHLORIDE 9 MG/ML
INJECTION, SOLUTION INTRAVENOUS
Status: DISCONTINUED | OUTPATIENT
Start: 2019-05-28 | End: 2019-05-28 | Stop reason: HOSPADM

## 2019-05-28 RX ORDER — BUPIVACAINE HYDROCHLORIDE 5 MG/ML
INJECTION, SOLUTION EPIDURAL; INTRACAUDAL AS NEEDED
Status: DISCONTINUED | OUTPATIENT
Start: 2019-05-28 | End: 2019-05-28 | Stop reason: HOSPADM

## 2019-05-28 RX ORDER — PROPOFOL 10 MG/ML
INJECTION, EMULSION INTRAVENOUS AS NEEDED
Status: DISCONTINUED | OUTPATIENT
Start: 2019-05-28 | End: 2019-05-28 | Stop reason: HOSPADM

## 2019-05-28 RX ORDER — DEXTROSE MONOHYDRATE 50 MG/ML
4-20 INJECTION, SOLUTION INTRAVENOUS CONTINUOUS
Status: DISCONTINUED | OUTPATIENT
Start: 2019-05-28 | End: 2019-05-28

## 2019-05-28 RX ORDER — DEXMEDETOMIDINE HYDROCHLORIDE 4 UG/ML
INJECTION, SOLUTION INTRAVENOUS AS NEEDED
Status: DISCONTINUED | OUTPATIENT
Start: 2019-05-28 | End: 2019-05-28 | Stop reason: HOSPADM

## 2019-05-28 RX ORDER — DOBUTAMINE HYDROCHLORIDE 200 MG/100ML
INJECTION INTRAVENOUS
Status: DISPENSED
Start: 2019-05-28 | End: 2019-05-29

## 2019-05-28 RX ORDER — PREDNISONE 5 MG/1
5 TABLET ORAL
Status: DISCONTINUED | OUTPATIENT
Start: 2019-05-29 | End: 2019-05-31

## 2019-05-28 RX ORDER — DOBUTAMINE HYDROCHLORIDE 200 MG/100ML
0-10 INJECTION INTRAVENOUS
Status: DISCONTINUED | OUTPATIENT
Start: 2019-05-28 | End: 2019-05-30

## 2019-05-28 RX ORDER — HYDROCORTISONE SODIUM SUCCINATE 100 MG/2ML
25 INJECTION, POWDER, FOR SOLUTION INTRAMUSCULAR; INTRAVENOUS EVERY 8 HOURS
Status: COMPLETED | OUTPATIENT
Start: 2019-05-28 | End: 2019-05-28

## 2019-05-28 RX ADMIN — CARVEDILOL 12.5 MG: 12.5 TABLET, FILM COATED ORAL at 17:16

## 2019-05-28 RX ADMIN — INSULIN GLARGINE 8 UNITS: 100 INJECTION, SOLUTION SUBCUTANEOUS at 08:53

## 2019-05-28 RX ADMIN — BUDESONIDE 250 MCG: 0.25 INHALANT RESPIRATORY (INHALATION) at 19:21

## 2019-05-28 RX ADMIN — SODIUM CHLORIDE 12 ML/HR: 900 INJECTION, SOLUTION INTRAVENOUS at 02:17

## 2019-05-28 RX ADMIN — Medication 10 ML: at 05:14

## 2019-05-28 RX ADMIN — INSULIN LISPRO 7 UNITS: 100 INJECTION, SOLUTION INTRAVENOUS; SUBCUTANEOUS at 17:16

## 2019-05-28 RX ADMIN — HYDROCORTISONE SODIUM SUCCINATE 25 MG: 100 INJECTION, POWDER, FOR SOLUTION INTRAMUSCULAR; INTRAVENOUS at 12:02

## 2019-05-28 RX ADMIN — POTASSIUM CHLORIDE 40 MEQ: 750 TABLET, FILM COATED, EXTENDED RELEASE ORAL at 21:43

## 2019-05-28 RX ADMIN — Medication 2 G: at 00:17

## 2019-05-28 RX ADMIN — CHLORHEXIDINE GLUCONATE 15 ML: 1.2 RINSE ORAL at 17:16

## 2019-05-28 RX ADMIN — POTASSIUM CHLORIDE 40 MEQ: 750 TABLET, FILM COATED, EXTENDED RELEASE ORAL at 17:16

## 2019-05-28 RX ADMIN — HYDROCORTISONE SODIUM SUCCINATE 25 MG: 100 INJECTION, POWDER, FOR SOLUTION INTRAMUSCULAR; INTRAVENOUS at 04:19

## 2019-05-28 RX ADMIN — SPIRONOLACTONE 25 MG: 25 TABLET ORAL at 17:16

## 2019-05-28 RX ADMIN — DEXMEDETOMIDINE HYDROCHLORIDE 4 MCG: 4 INJECTION, SOLUTION INTRAVENOUS at 12:34

## 2019-05-28 RX ADMIN — OXYCODONE HYDROCHLORIDE AND ACETAMINOPHEN 1 TABLET: 5; 325 TABLET ORAL at 21:43

## 2019-05-28 RX ADMIN — ISOSORBIDE DINITRATE 20 MG: 20 TABLET ORAL at 08:41

## 2019-05-28 RX ADMIN — HYDROCORTISONE SODIUM SUCCINATE 25 MG: 100 INJECTION, POWDER, FOR SOLUTION INTRAMUSCULAR; INTRAVENOUS at 20:40

## 2019-05-28 RX ADMIN — ISOSORBIDE DINITRATE 20 MG: 20 TABLET ORAL at 21:43

## 2019-05-28 RX ADMIN — CARVEDILOL 12.5 MG: 12.5 TABLET, FILM COATED ORAL at 08:41

## 2019-05-28 RX ADMIN — PRAVASTATIN SODIUM 40 MG: 40 TABLET ORAL at 21:43

## 2019-05-28 RX ADMIN — Medication 2 G: at 08:41

## 2019-05-28 RX ADMIN — PROPOFOL 30 MG: 10 INJECTION, EMULSION INTRAVENOUS at 12:34

## 2019-05-28 RX ADMIN — SPIRONOLACTONE 25 MG: 25 TABLET ORAL at 08:41

## 2019-05-28 RX ADMIN — PROPOFOL 20 MG: 10 INJECTION, EMULSION INTRAVENOUS at 12:30

## 2019-05-28 RX ADMIN — DIGOXIN 0.25 MG: 250 TABLET ORAL at 08:41

## 2019-05-28 RX ADMIN — OXYCODONE HYDROCHLORIDE AND ACETAMINOPHEN 1 TABLET: 5; 325 TABLET ORAL at 04:20

## 2019-05-28 RX ADMIN — SODIUM CHLORIDE: 9 INJECTION, SOLUTION INTRAVENOUS at 12:20

## 2019-05-28 RX ADMIN — Medication 10 ML: at 17:17

## 2019-05-28 RX ADMIN — PANTOPRAZOLE SODIUM 40 MG: 40 TABLET, DELAYED RELEASE ORAL at 08:41

## 2019-05-28 RX ADMIN — DEXMEDETOMIDINE HYDROCHLORIDE 4 MCG: 4 INJECTION, SOLUTION INTRAVENOUS at 12:30

## 2019-05-28 RX ADMIN — FUROSEMIDE 40 MG: 40 TABLET ORAL at 08:41

## 2019-05-28 RX ADMIN — HYDRALAZINE HYDROCHLORIDE 25 MG: 25 TABLET, FILM COATED ORAL at 08:41

## 2019-05-28 RX ADMIN — DEXTROSE MONOHYDRATE 12.7 ML/HR: 5 INJECTION, SOLUTION INTRAVENOUS at 06:24

## 2019-05-28 RX ADMIN — CLONAZEPAM 0.5 MG: 0.5 TABLET ORAL at 21:43

## 2019-05-28 RX ADMIN — BUDESONIDE 250 MCG: 0.25 INHALANT RESPIRATORY (INHALATION) at 07:25

## 2019-05-28 NOTE — PROGRESS NOTES
Progress Note    Patient: Socrates Morales MRN: [id number]  SSN: [id number]    YOB: 1960  Age: 66 y.o. Sex: female      Admit Date: 5/18/2019    LOS: 10 days     Subjective:     65 yo WF with non-STEMI, mild CAD, stress cardiomyopathy in the pattern of Takotsubo. Developed declining hemodynamic function 5/21/19 and required increased vasopressor support and emergent impella by CT surgery. Pt now off vasopressin ,paresh and cardene. Had impella removed today 5/28/19. State she feels better. Says impella removal went well. Denies chest pain, SOB. Still with a cough but improved. Objective:     Vitals:    05/28/19 1345 05/28/19 1400 05/28/19 1415 05/28/19 1431   BP:    104/55   Pulse: 76 77 76    Resp: 14 13 15    Temp:       SpO2: 96% 96% 96%    Weight:    118 lb (53.5 kg)   Height:    5' 4\" (1.626 m)        Intake and Output:  Current Shift: 05/28 0701 - 05/28 1900  In: 204.8 [I.V.:204.8]  Out: 1025 [Urine:1000]  Last three shifts: 05/26 1901 - 05/28 0700  In: 1834.6 [P.O.:720; I.V.:1114.6]  Out: 4300 [Urine:4300]    Physical Exam:   General:  Alert, cooperative, no distress, appears older than stated age. Sitting up in chair smiling. Eyes:  Conjunctivae/corneas clear. Pupils equal   Ears:  Hearing normal   Nose: Nares normal. Septum midline. Mucosa normal. No drainage   Mouth/Throat: Lips, mucosa, normal.   Neck: Supple, symmetrical, trachea midline,  Rt IJ swan in place. no JVD. Back:   Symmetric, no curvature. Lungs:   Diminished to auscultation bilateral bases. no wheeze, rales. Heart:  Regular rate and rhythm, S1, S2 normal, no murmur, click, rub or gallop. Abdomen:   Soft, non-distended, nontender. Bowel sounds normal. No masses,  No organomegaly. Extremities: Extremities normal, atraumatic, no cyanosis or edema. Pulses: 1+ and symmetric all extremities. Skin: Skin color, texture, turgor normal. No rashes or lesions       Neurologic: CNII-XII grossly intact.  ASHWIN Lab/Data Review:  BMP:   Lab Results   Component Value Date/Time     05/28/2019 03:54 AM    K 4.4 05/28/2019 03:54 AM     05/28/2019 03:54 AM    CO2 31 05/28/2019 03:54 AM    AGAP 5 05/28/2019 03:54 AM     (H) 05/28/2019 03:54 AM    BUN 26 (H) 05/28/2019 03:54 AM    CREA 1.08 (H) 05/28/2019 03:54 AM    GFRAA >60 05/28/2019 03:54 AM    GFRNA 52 (L) 05/28/2019 03:54 AM     CMP:   Lab Results   Component Value Date/Time     05/28/2019 03:54 AM    K 4.4 05/28/2019 03:54 AM     05/28/2019 03:54 AM    CO2 31 05/28/2019 03:54 AM    AGAP 5 05/28/2019 03:54 AM     (H) 05/28/2019 03:54 AM    BUN 26 (H) 05/28/2019 03:54 AM    CREA 1.08 (H) 05/28/2019 03:54 AM    GFRAA >60 05/28/2019 03:54 AM    GFRNA 52 (L) 05/28/2019 03:54 AM    CA 8.6 05/28/2019 03:54 AM    ALB 2.8 (L) 05/28/2019 03:54 AM    TP 6.1 (L) 05/28/2019 03:54 AM    GLOB 3.3 05/28/2019 03:54 AM    AGRAT 0.8 (L) 05/28/2019 03:54 AM    SGOT 27 05/28/2019 03:54 AM    ALT 10 (L) 05/28/2019 03:54 AM         Assessment:     Active Problems:    NSTEMI (non-ST elevated myocardial infarction) ([facility] [address] (5/18/2019)      Chest pain (5/18/2019)      Overview: Added automatically from request for surgery [id number]      CHF (congestive heart failure), NYHA class IV, acute on chronic, systolic (HCC) (6/01/7826)      Overview: Added automatically from request for surgery [id number]      Abnormal TSH/Free T3  Nonobstructive, mild CAD:   Left Anterior Descending   Ost LAD to Prox LAD lesion 20% stenosed. .   Prox LAD lesion 30% stenosed. .   Mid LAD lesion 60% stenosed. .   Left Circumflex   Prox Cx to Mid Cx lesion 30% stenosed. .   Right Coronary Artery   Prox RCA to Mid RCA lesion 30% stenosed. Kailash Murphy NSVT    Plan:     1. Stress cardiomyopathy/Acute HFrEF:  EF 20-25%   -AHF following. NYHA IV on admission   -Impella removed today by CT surgery.   -Lasix 40 mg daily   -Pro BNP trending down 6403.  Net neg 2.9 liters since admission   -Coreg, hydralazine, isordil, digoxin, aldactone per F. 2.  CAD, mild: (see above)   -ASA. Start statin. HDL 46, LDL 77.6    3. Endocrine derangement:   -Endocrine following   -Hypothyroid, possible hypopituitarism (unlikely per endocrine), DM (A1C=6.4)    4. Tobacco abuse:    -Discussed/encouraged smoking cessation. 5. NSVT:   -Had 1 brief run today (6 beat)   -K=4.4. Check mg     6. Hx of cocaine use   -UDS + for cocaine on admission     7.1 Brief run of PAF on tele review (<1 min) last week   -Dig started by [facility] for #1.     -No PAF on telemetry review      66 y.o. With stress cardiomyopathy who had hemodynamic decline 5/21/19 requiring vasosupport and emergent impella. Now clinical and hemodynamic status improved-  off impella. will follow up repeat echo    Signed By: Connie Aguilar.  INDIRA Ibanez     May 28, 2019

## 2019-05-28 NOTE — PROGRESS NOTES
NUTRITION  Pt seen for:     [x]           LOS  RECOMMENDATIONS:   1. Resume previous diet after impella removal  2. Follow BG trends with insulin adjustment per DTC recommendations  3. Continue daily weights    SUBJECTIVE/OBJECTIVE:   Information obtained from:   patient        Pt admitted for NSTEMI/CHF. PMHx: fibromyalgia, GERD. NSTEMI on admit s/p impella placement. Plans for removal today noted. BG elevated this admit with A1c of 6.1%. DTC following. Pt reports hx of gestational DM when pregnant with her son 25yrs ago, but also on steroids. Notes trying to make better choices this admit but avoiding soda/sweet tea. Pt eating well with good appetite. No questions/concerns at this time. Will follow for PO intake and diet education needs PRN, plan to rescreen per protocol. Diet:  NPO for impella removal (cardiac, NCS, 1500-1500kcal prior)  Supplements: Ensure Enlive TID  Intake: []           Good     []           Fair      []           Poor   Patient Vitals for the past 100 hrs:   % Diet Eaten   05/27/19 1900 100 %   05/27/19 1330 100 %   05/27/19 0900 100 %   05/26/19 1800 100 %   05/26/19 1200 90 %   05/26/19 1000 95 %   05/25/19 1800 90 %   05/25/19 1300 90 %   05/25/19 0800 95 %   05/24/19 1600 75 %   05/24/19 1300 75 %   05/24/19 0900 100 %     Weight Changes:   [x]            Loss - Admit wt 55.3kg on standing scale. ? Related to improvement in fluid status. No edema noted. Continue daily weights to monitor trends. Predict current wt more likely accurate based on appearance.    Last 3 Recorded Weights in this Encounter    05/26/19 0645 05/27/19 0712 05/28/19 0647   Weight: 55.7 kg (122 lb 12.7 oz) 54.7 kg (120 lb 9.6 oz) 53.8 kg (118 lb 11.2 oz)      Nutrition Problems Identified:  [x]      None    PLAN:   []           Obtained/adjusted food preferences/tolerances and/or snacks options   []           Adjust texture due to difficulty chewing   [x]   Continue current diet  []           Educated patient  []           Add Supplements    Rescreen:  []            At Nutrition Risk           [x]            Not at Nutrition Risk, rescreen per screening protocol    Jade Mccray, RD Pager #[phone] or [phone]

## 2019-05-28 NOTE — PROGRESS NOTES
CM attempted to meet with patient this morning regarding [facility] referral for CHF and NSTEMI. Patient currently with RN. Patient plan is to have Impella removed this PM.    CM will follow up with patient this afternoon.     Breanna Dyer MPH

## 2019-05-28 NOTE — PROGRESS NOTES
hospitals ICU Progress Note    Admit Date: 2019  POD:  7 Day Post-Op    Procedure:  Procedure(s):  IMPELLA INSERTION RIGHT SUBCLAVIAN . TRANSESOPHAGEAL ECHOCARDIOGRAM BY DR. Erich Holland. Subjective:   Pt seen with Dr. Garth Zurita. For Impella removal today, pt sitting up in chair - nervous about Impella removal. Off gtts, Impella weaned down to P2     Objective:   Vitals:  Blood pressure 136/46, pulse 86, temperature 98.1 °F (36.7 °C), resp. rate 19, height 5' 4\" (1.626 m), weight 118 lb 11.2 oz (53.8 kg), last menstrual period 2011, SpO2 100 %. Temp (24hrs), Av.1 °F (36.7 °C), Min:97.6 °F (36.4 °C), Max:98.6 °F (37 °C)    Hemodynamics:   CO: CO (l/min): 4.4 l/min   CI: CI (l/min/m2): 2.7 l/min/m2   CVP: CVP (mmHg): 1 mmHg (19 07)   SVR: SVR (dyne*sec)/cm5: 1169 (dyne*sec)/cm5 (19 4863)   PAP Systolic: PAP Systolic: 17 (38/86/44 6424)   PAP Diastolic: PAP Diastolic: 8 (49/1978)   PVR:     SV02: SVO2 (%): (Unable to calibrate/drawback on PA catheter. MD aware.) (19)   SCV02:      EKG/Rhythm:  NSR vs ST in 100's    Oxygen Therapy:  Oxygen Therapy  O2 Sat (%): 100 % (19 0800)  Pulse via Oximetry: 85 beats per minute (19 0800)  O2 Device: Room air (19 0725)  O2 Flow Rate (L/min): 2 l/min (19 0200)  O2 Temperature: 39.2 °F (4 °C) (19 0600)  FIO2 (%): 40 % (19 191)    CXR:   CXR Results  (Last 48 hours)               19 0509  XR CHEST PORT Final result    Impression:  Clear lungs. Narrative:  PORTABLE CHEST RADIOGRAPH/S: 2019 5:09 AM       INDICATION: Pulmonary arterial catheter placement. COMPARISON: 2019, 2019, 2019. TECHNIQUE: Portable frontal semiupright radiograph/s of the chest.       FINDINGS:    A pulmonary arterial catheter via a right IJ sheath and a catheter-based LVAD   device are in place. The lungs are clear. The central airways are patent. No   pneumothorax or pleural effusion. 05/27/19 0425  XR CHEST PORT Final result    Impression:  No significant change. Narrative:  EXAM:  XR CHEST PORT. INDICATION: PAC placement. COMPARISON: 5/26/2019. FINDINGS:    A portable AP radiograph of the chest was obtained at 0343 hours. The right   subclavian Impella device is unchanged in position. Lines and tubes: The patient is on a cardiac monitor. The right jugular   [place]-Krishan catheter with tip over the main pulmonary artery is unchanged. Lungs: The lungs are clear. Pleura: There is no pneumothorax or pleural effusion. Mediastinum: The cardiac and mediastinal contours and pulmonary vascularity are   normal.   Bones and soft tissues: The bones and soft tissues are grossly within normal   limits. There are surgical clips and staples in the right infraclavicular   region. Admission Weight: Last Weight   Weight: 125 lb (56.7 kg) Weight: 118 lb 11.2 oz (53.8 kg)     Intake / Output / Drain:  Current Shift: No intake/output data recorded. Last 24 hrs.:     Intake/Output Summary (Last 24 hours) at 5/28/2019 0807  Last data filed at 5/28/2019 0700  Gross per 24 hour   Intake 1357.71 ml   Output 2500 ml   Net -1142.29 ml       EXAM:  General:  NAD, alert                                                                                           Lungs:   Clear to auscultation bilaterally. Incision:  dsg cdi   Heart:  Regular rate and rhythm, S1, S2 normal, no murmur, click, rub or gallop. Abdomen:   Soft, non-tender. Bowel sounds normal. No masses,  No organomegaly. Extremities:  No edema. PPP. Neurologic:  Gross motor and sensory apparatus intact.      Labs:   Recent Labs     05/28/19  0627 05/28/19  0355 05/28/19  0354   WBC  --  17.3*  --    HGB  --  10.2*  --    HCT  --  31.6*  --    PLT  --  178  --    NA  --   --  138   K  --   --  4.4   BUN  --   --  26*   CREA  --   --  1.08*   GLU  --   --  135*   GLUCPOC 133*  --   --    INR  --   --  1.3* Assessment:     Active Problems:    NSTEMI (non-ST elevated myocardial infarction) ([facility] [address] (2019)      Chest pain (2019)      Overview: Added automatically from request for surgery [id number]      CHF (congestive heart failure), NYHA class IV, acute on chronic, systolic ([facility] [address] ()      Overview: Added automatically from request for surgery [id number]         Plan/Recommendations/Medical Decision Makin. Acute systolic heart failure (NYHA class IV on admission): Impella placed  - for removal today. On ancef for impella ppx, will discuss with Dr. Freddie Peabody when to d/c. Bival on hold for removal. Cont to trend LD, pBNP, lactate. AHF following. On dig, aldactone. 2. Atelectasis: Encourage I/S, on RA. 3. Endocrinological derangement: On hydrocortisone - weaning dose per AHF/endocrine. Endocrine following. 4. Hx arthritis, fibromyalgia, chronic pain, DJD  5. GERD: on protonix  6. Hx stroke: pt reports stoke in the past, no deficits  7. COPD: Current smoker, smoking cessation education. On RA, cont nebs, prn duoneb. 8. Anemia: received IV iron, monitor. 9. Hyperglycemia: on Lantus, SSI. Cont  10. Leukocytosis: on steroids, afebrile currently. Monitor   11. Dispo: Cont ICU care, Impella removal today at bedside per Dr. Freddie Peabody.  Further plans per FS    Signed By: Arsenio Crowe NP
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
## 2019-05-28 NOTE — PROGRESS NOTES
1950: Bedside shift change report given to Marylen Laura, RN (oncoming nurse) by Padmini Lester RN (offgoing nurse). Report included the following information SBAR, Kardex, Procedure Summary, Intake/Output, MAR, Accordion, Recent Results, Med Rec Status and Cardiac Rhythm NSR to ST .    2000: Assumed care of patient. AO x 4, returned to bed. No complaints of pain at this time. VSS. Impella device remains at P3 and placement signal is unknown, MD aware. 2200: 1st CHG bath completed. 8124: Dr. Michael Miranda at bedside orders received to change purge fluid to D5% and reduce to P2   0700: Uneventful shift. VSS. Pt slept most of the night. 0745: Bedside shift change report given to Pato Roman RN (oncoming nurse) by Marylen Laura, RN (offgoing nurse). Report included the following information SBAR, Kardex, Procedure Summary, Intake/Output, MAR, Accordion, Recent Results, Med Rec Status and Cardiac Rhythm NSR.        Problem: Unstable Angina/NSTEMI: Discharge Outcomes  Goal: *Hemodynamically stable  Outcome: Progressing Towards Goal  Goal: *Stable cardiac rhythm  Outcome: Progressing Towards Goal  Note:   NSR w/ PVCs  Goal: *Lungs clear or at baseline  Outcome: Progressing Towards Goal  Goal: *Optimal pain control at patient's stated goal  Outcome: Progressing Towards Goal

## 2019-05-28 NOTE — PROGRESS NOTES
NYHA class IV acute systolic heart failure  Cardiogenic shock    Planning Impella removal today    Going over issues with patient     Addendum:    Impella removed    BP looks good     Filling pressures look good    Hgb and platelets stable    Holding bivalirudin     Creatinine normal    Bilirubin and other LFTs look good    Lactic acid normal    NT pro-BNP coming down     Risk of morbidity and mortality - high  Medical decision making - high complexity    Total critical care time - 30 minutes (CPT 50237)

## 2019-05-28 NOTE — ANESTHESIA POSTPROCEDURE EVALUATION
Post-Anesthesia Evaluation and Assessment    Patient: Renata Maurer MRN: [id number]  SSN: [id number]    YOB: 1960  Age: 66 y.o. Sex: female      I have evaluated the patient and they are stable and ready for discharge from the PACU. Cardiovascular Function/Vital Signs  Visit Vitals  /55   Pulse 71   Temp 36.8 °C (98.2 °F)   Resp 13   Ht 5' 4\" (1.626 m)   Wt 53.8 kg (118 lb 11.2 oz)   SpO2 100%   BMI 20.37 kg/m²       Patient is status post General anesthesia for Procedure(s):  IMPELLA REMOVAL. Nausea/Vomiting: None    Postoperative hydration reviewed and adequate. Pain:  Pain Scale 1: Numeric (0 - 10) (05/28/19 0800)  Pain Intensity 1: 0 (05/28/19 0800)   Managed    Neurological Status:   Neuro  Neurologic State: Alert; Appropriate for age (05/27/19 2000)  Orientation Level: Oriented X4 (05/27/19 2000)  Cognition: Appropriate decision making; Appropriate for age attention/concentration; Appropriate safety awareness; Follows commands (05/27/19 2000)  Speech: Appropriate for age;Clear (05/27/19 2000)  Assessment L Pupil: Brisk;Round (05/27/19 2000)  Size L Pupil (mm): 3 (05/27/19 2000)  Assessment R Pupil: Brisk;Round (05/27/19 2000)  Size R Pupil (mm): 3 (05/27/19 2000)  LUE Motor Response: Purposeful;Spontaneous  (05/27/19 2000)  LLE Motor Response: Purposeful;Spontaneous  (05/27/19 2000)  RUE Motor Response: Purposeful;Spontaneous  (05/27/19 2000)  RLE Motor Response: Purposeful;Spontaneous  (05/27/19 2000)   At baseline    Mental Status, Level of Consciousness: Alert and  oriented to person, place, and time    Pulmonary Status:   O2 Device: Nasal cannula (05/28/19 1241)   Adequate oxygenation and airway patent    Complications related to anesthesia: None    Post-anesthesia assessment completed. No concerns    Signed By: César Molina MD     May 28, 2019              Procedure(s):  IMPELLA REMOVAL. MAC    <BSHSIANPOST>    No vitals data found for the desired time range.

## 2019-05-28 NOTE — PROGRESS NOTES
0800 Bedside shift change report given to Debbie Nevarez (oncoming nurse) by Anton Black (offgoing nurse). Report included the following information SBAR, MAR and Cardiac Rhythm NSR.     0950 [place] length charted on 5/27 0800 as 55cm at 2000 charted at 51cm. 5/28 0800 length 51cm. Stat chest x-ray ordered to confirm swan placement. Dr. Francisco Menchaca and Camelia Gotti. NP updated. 1000 2nd chlorhexidine bath done. 1010 Chest xray shows swan catheter in appropriate position. 1215 Procedural team at bedside for impella removal.     1300 Pt stable post impella removal. Vital signs WDL. Pt reports no complaints of pain. Resting in bed. 1700 Hold Hydralazine and isosobide dinitrate due to soft blood pressure. , MAP 67-70.    2000 Bedside shift change report given to Anton Black (oncoming nurse) by Debbie Nevarez (offgoing nurse). Report included the following information SBAR, MAR and Cardiac Rhythm NSR.

## 2019-05-29 ENCOUNTER — APPOINTMENT (OUTPATIENT)
Dept: GENERAL RADIOLOGY | Age: 59
DRG: 215 | End: 2019-05-29
Attending: NURSE PRACTITIONER
Payer: MEDICARE

## 2019-05-29 LAB
ANION GAP SERPL CALC-SCNC: 5 MMOL/L (ref 5–15)
APTT PPP: 22.4 SEC (ref 22.1–32)
BNP SERPL-MCNC: 6234 PG/ML
BUN SERPL-MCNC: 35 MG/DL (ref 6–20)
BUN/CREAT SERPL: 35 (ref 12–20)
CALCIUM SERPL-MCNC: 8.4 MG/DL (ref 8.5–10.1)
CHLORIDE SERPL-SCNC: 102 MMOL/L (ref 97–108)
CO2 SERPL-SCNC: 32 MMOL/L (ref 21–32)
CREAT SERPL-MCNC: 0.99 MG/DL (ref 0.55–1.02)
DIGOXIN SERPL-MCNC: 1.5 NG/ML (ref 0.9–2)
ECHO RV TAPSE: 1.95 CM (ref 1.5–2)
GLUCOSE BLD STRIP.AUTO-MCNC: 142 MG/DL (ref 65–100)
GLUCOSE BLD STRIP.AUTO-MCNC: 188 MG/DL (ref 65–100)
GLUCOSE BLD STRIP.AUTO-MCNC: 67 MG/DL (ref 65–100)
GLUCOSE BLD STRIP.AUTO-MCNC: 86 MG/DL (ref 65–100)
GLUCOSE BLD STRIP.AUTO-MCNC: 92 MG/DL (ref 65–100)
GLUCOSE SERPL-MCNC: 148 MG/DL (ref 65–100)
INR PPP: 1.1 (ref 0.9–1.1)
LACTATE SERPL-SCNC: 1.1 MMOL/L (ref 0.4–2)
LDH SERPL L TO P-CCNC: 454 U/L (ref 81–246)
LDH SERPL L TO P-CCNC: 478 U/L (ref 81–246)
MAGNESIUM SERPL-MCNC: 2.1 MG/DL (ref 1.6–2.4)
POTASSIUM SERPL-SCNC: 4.4 MMOL/L (ref 3.5–5.1)
PROCALCITONIN SERPL-MCNC: <0.1 NG/ML
PROTHROMBIN TIME: 11 SEC (ref 9–11.1)
SERVICE CMNT-IMP: ABNORMAL
SERVICE CMNT-IMP: ABNORMAL
SERVICE CMNT-IMP: NORMAL
SODIUM SERPL-SCNC: 139 MMOL/L (ref 136–145)
T4 FREE SERPL-MCNC: 1.2 NG/DL (ref 0.8–1.5)
THERAPEUTIC RANGE,PTTT: NORMAL SECS (ref 58–77)

## 2019-05-29 PROCEDURE — 80048 BASIC METABOLIC PNL TOTAL CA: CPT

## 2019-05-29 PROCEDURE — 71045 X-RAY EXAM CHEST 1 VIEW: CPT

## 2019-05-29 PROCEDURE — 74011000250 HC RX REV CODE- 250: Performed by: HOSPITALIST

## 2019-05-29 PROCEDURE — 74011250636 HC RX REV CODE- 250/636: Performed by: NURSE PRACTITIONER

## 2019-05-29 PROCEDURE — 74011250637 HC RX REV CODE- 250/637: Performed by: INTERNAL MEDICINE

## 2019-05-29 PROCEDURE — 74011250637 HC RX REV CODE- 250/637: Performed by: NURSE PRACTITIONER

## 2019-05-29 PROCEDURE — 94640 AIRWAY INHALATION TREATMENT: CPT

## 2019-05-29 PROCEDURE — 83605 ASSAY OF LACTIC ACID: CPT

## 2019-05-29 PROCEDURE — 65660000001 HC RM ICU INTERMED STEPDOWN

## 2019-05-29 PROCEDURE — 85730 THROMBOPLASTIN TIME PARTIAL: CPT

## 2019-05-29 PROCEDURE — 74011000250 HC RX REV CODE- 250

## 2019-05-29 PROCEDURE — 84145 PROCALCITONIN (PCT): CPT

## 2019-05-29 PROCEDURE — 99233 SBSQ HOSP IP/OBS HIGH 50: CPT | Performed by: INTERNAL MEDICINE

## 2019-05-29 PROCEDURE — 83735 ASSAY OF MAGNESIUM: CPT

## 2019-05-29 PROCEDURE — 74011250637 HC RX REV CODE- 250/637: Performed by: HOSPITALIST

## 2019-05-29 PROCEDURE — 94760 N-INVAS EAR/PLS OXIMETRY 1: CPT

## 2019-05-29 PROCEDURE — 83880 ASSAY OF NATRIURETIC PEPTIDE: CPT

## 2019-05-29 PROCEDURE — 80162 ASSAY OF DIGOXIN TOTAL: CPT

## 2019-05-29 PROCEDURE — 36415 COLL VENOUS BLD VENIPUNCTURE: CPT

## 2019-05-29 PROCEDURE — 74011636637 HC RX REV CODE- 636/637: Performed by: INTERNAL MEDICINE

## 2019-05-29 PROCEDURE — 74011636637 HC RX REV CODE- 636/637: Performed by: NURSE PRACTITIONER

## 2019-05-29 PROCEDURE — 83615 LACTATE (LD) (LDH) ENZYME: CPT

## 2019-05-29 PROCEDURE — 85610 PROTHROMBIN TIME: CPT

## 2019-05-29 PROCEDURE — 82962 GLUCOSE BLOOD TEST: CPT

## 2019-05-29 PROCEDURE — 84439 ASSAY OF FREE THYROXINE: CPT

## 2019-05-29 RX ORDER — IPRATROPIUM BROMIDE 0.5 MG/2.5ML
SOLUTION RESPIRATORY (INHALATION)
Status: COMPLETED
Start: 2019-05-29 | End: 2019-05-29

## 2019-05-29 RX ORDER — HYDRALAZINE HYDROCHLORIDE 25 MG/1
25 TABLET, FILM COATED ORAL 3 TIMES DAILY
Status: CANCELLED | OUTPATIENT
Start: 2019-05-29

## 2019-05-29 RX ORDER — CARVEDILOL 12.5 MG/1
12.5 TABLET ORAL 2 TIMES DAILY WITH MEALS
Status: DISCONTINUED | OUTPATIENT
Start: 2019-05-29 | End: 2019-05-31

## 2019-05-29 RX ORDER — DIGOXIN 125 MCG
0.12 TABLET ORAL DAILY
Status: DISCONTINUED | OUTPATIENT
Start: 2019-05-30 | End: 2019-05-31

## 2019-05-29 RX ORDER — FUROSEMIDE 40 MG/1
40 TABLET ORAL DAILY
Status: CANCELLED | OUTPATIENT
Start: 2019-05-30

## 2019-05-29 RX ADMIN — SACUBITRIL AND VALSARTAN 1 TABLET: 24; 26 TABLET, FILM COATED ORAL at 09:42

## 2019-05-29 RX ADMIN — POTASSIUM CHLORIDE 40 MEQ: 750 TABLET, FILM COATED, EXTENDED RELEASE ORAL at 20:39

## 2019-05-29 RX ADMIN — HYDRALAZINE HYDROCHLORIDE 25 MG: 25 TABLET, FILM COATED ORAL at 09:44

## 2019-05-29 RX ADMIN — INSULIN GLARGINE 8 UNITS: 100 INJECTION, SOLUTION SUBCUTANEOUS at 09:40

## 2019-05-29 RX ADMIN — CHLORHEXIDINE GLUCONATE 15 ML: 1.2 RINSE ORAL at 10:05

## 2019-05-29 RX ADMIN — ISOSORBIDE DINITRATE 20 MG: 20 TABLET ORAL at 09:44

## 2019-05-29 RX ADMIN — ASPIRIN 81 MG: 81 TABLET ORAL at 09:44

## 2019-05-29 RX ADMIN — SENNOSIDES,DOCUSATE SODIUM 1 TABLET: 8.6; 5 TABLET, FILM COATED ORAL at 09:43

## 2019-05-29 RX ADMIN — IPRATROPIUM BROMIDE 0.5 MG: 0.5 SOLUTION RESPIRATORY (INHALATION) at 08:15

## 2019-05-29 RX ADMIN — PANTOPRAZOLE SODIUM 40 MG: 40 TABLET, DELAYED RELEASE ORAL at 10:11

## 2019-05-29 RX ADMIN — OXYCODONE HYDROCHLORIDE AND ACETAMINOPHEN 1 TABLET: 5; 325 TABLET ORAL at 20:39

## 2019-05-29 RX ADMIN — FUROSEMIDE 40 MG: 40 TABLET ORAL at 09:44

## 2019-05-29 RX ADMIN — Medication 10 ML: at 01:31

## 2019-05-29 RX ADMIN — OXYCODONE HYDROCHLORIDE AND ACETAMINOPHEN 1 TABLET: 5; 325 TABLET ORAL at 02:03

## 2019-05-29 RX ADMIN — PRAVASTATIN SODIUM 40 MG: 40 TABLET ORAL at 20:40

## 2019-05-29 RX ADMIN — PREDNISONE 5 MG: 10 TABLET ORAL at 09:45

## 2019-05-29 RX ADMIN — INSULIN LISPRO 3 UNITS: 100 INJECTION, SOLUTION INTRAVENOUS; SUBCUTANEOUS at 14:55

## 2019-05-29 RX ADMIN — Medication 10 ML: at 05:27

## 2019-05-29 RX ADMIN — Medication 10 ML: at 19:13

## 2019-05-29 RX ADMIN — Medication 400 MG: at 09:45

## 2019-05-29 RX ADMIN — POLYETHYLENE GLYCOL 3350 17 G: 17 POWDER, FOR SOLUTION ORAL at 09:46

## 2019-05-29 RX ADMIN — SODIUM CHLORIDE 500 ML: 900 INJECTION, SOLUTION INTRAVENOUS at 11:10

## 2019-05-29 RX ADMIN — SPIRONOLACTONE 25 MG: 25 TABLET ORAL at 22:13

## 2019-05-29 RX ADMIN — POTASSIUM CHLORIDE 40 MEQ: 750 TABLET, FILM COATED, EXTENDED RELEASE ORAL at 09:43

## 2019-05-29 RX ADMIN — CARVEDILOL 12.5 MG: 12.5 TABLET, FILM COATED ORAL at 09:45

## 2019-05-29 RX ADMIN — CHLORHEXIDINE GLUCONATE 15 ML: 1.2 RINSE ORAL at 19:13

## 2019-05-29 RX ADMIN — DIGOXIN 0.25 MG: 250 TABLET ORAL at 09:43

## 2019-05-29 RX ADMIN — BUDESONIDE 250 MCG: 0.25 INHALANT RESPIRATORY (INHALATION) at 07:07

## 2019-05-29 RX ADMIN — BUDESONIDE 250 MCG: 0.25 INHALANT RESPIRATORY (INHALATION) at 20:40

## 2019-05-29 RX ADMIN — CLONAZEPAM 0.5 MG: 0.5 TABLET ORAL at 20:39

## 2019-05-29 RX ADMIN — SPIRONOLACTONE 25 MG: 25 TABLET ORAL at 09:44

## 2019-05-29 NOTE — PROGRESS NOTES
1950: Bedside shift change report given to Darlin Ortiz, RN (oncoming nurse) by Sarah Abel RN (offgoing nurse). Report included the following information SBAR, Kardex, Procedure Summary, Intake/Output, MAR, Accordion, Recent Results, Med Rec Status and Cardiac Rhythm NSR .    2000: Assumed care of patient resting quietly in bed, AO x 4, no complaints of pain at this time. VSS. BP a little soft (MAPs 65-67). Will continue to monitor. 0700: Uneventful shift. VSS.   0755: Bedside shift change report given to Beatriz Evans RN (oncoming nurse) by Darlin Ortiz RN (offgoing nurse). Report included the following information SBAR, Kardex, Procedure Summary, Intake/Output, MAR, Accordion, Recent Results, Med Rec Status and Cardiac Rhythm NSR. Problem: Unstable Angina/NSTEMI: Discharge Outcomes  Goal: *Hemodynamically stable  Outcome: Progressing Towards Goal  Note:   VSS WDL  Goal: *Stable cardiac rhythm  Outcome: Progressing Towards Goal  Note:   NSR  Goal: *Lungs clear or at baseline  Outcome: Progressing Towards Goal     Problem: Heart Failure: Discharge Outcomes  Goal: *Demonstrates ability to perform prescribed activity without shortness of breath or discomfort  Outcome: Progressing Towards Goal  Goal: *Verbalizes understanding and describes prescribed diet  Outcome: Progressing Towards Goal     Problem: Falls - Risk of  Goal: *Absence of Falls  Description  Document Jared Fall Risk and appropriate interventions in the flowsheet.   Outcome: Progressing Towards Goal  Note:   Fall Risk Interventions:  Mobility Interventions: Communicate number of staff needed for ambulation/transfer, Patient to call before getting OOB, PT Consult for mobility concerns, PT Consult for assist device competence         Medication Interventions: Evaluate medications/consider consulting pharmacy, Patient to call before getting OOB, Teach patient to arise slowly    Elimination Interventions: Call light in reach, Patient to call for help with toileting needs, Stay With Me (per policy), Toilet paper/wipes in reach, Toileting schedule/hourly rounds    History of Falls Interventions: Door open when patient unattended, Room close to nurse's station         Problem: Pressure Injury - Risk of  Goal: *Prevention of pressure injury  Description  Document Salvatore Scale and appropriate interventions in the flowsheet.   Outcome: Progressing Towards Goal  Note:   Pressure Injury Interventions:  Sensory Interventions: Assess changes in LOC, Assess need for specialty bed, Float heels, Keep linens dry and wrinkle-free, Maintain/enhance activity level, Minimize linen layers, Monitor skin under medical devices, Pad between skin to skin, Pressure redistribution bed/mattress (bed type)    Moisture Interventions: Check for incontinence Q2 hours and as needed, Minimize layers    Activity Interventions: Increase time out of bed, Pressure redistribution bed/mattress(bed type)    Mobility Interventions: Float heels, Pressure redistribution bed/mattress (bed type), PT/OT evaluation    Nutrition Interventions: Document food/fluid/supplement intake    Friction and Shear Interventions: Lift sheet

## 2019-05-29 NOTE — OP NOTES
[address]   OPERATIVE REPORT    Name:  Edwin Dawkins  MR#:  [id number]  :  1960  ACCOUNT #:  [de-identified]  DATE OF SERVICE:  2019    PREOPERATIVE DIAGNOSIS:  Cardiogenic shock. POSTOPERATIVE DIAGNOSIS:  Cardiogenic shock. PROCEDURE PERFORMED:  1. Removal of percutaneous ventricular assist device (CPT code 19541). 2.  Embolectomy of the right subclavian and axillary artery by chest incision (CPT code 74631). SURGEON:  Ricardo Johnson MD    ASSISTANT:  CONNIE Lees    ANESTHESIA:  General endotracheal anesthesia. COMPLICATIONS:  None. SPECIMENS REMOVED:  None. IMPLANTS:  None. ESTIMATED BLOOD LOSS:  10 mL. PROCEDURE:  The patient is a very pleasant 51-year-old woman who had cardiogenic shock, who is now recovering having her Impella removed. The patient was sedated with fentanyl and Versed. Next, the patient had her chest prepped and draped in usual sterile fashion. We then removed a couple of staples from her previous right axillary Impella site, removed the Impella, then used a #4 Lien to form a thrombectomy in the right axillary and right subclavian artery. We then stapled off the graft and returned the graft back to the incision. We then irrigated the wound out and closed it with Vicryl sutures and staples. Overall, the patient tolerated the procedure well. I was present for the entire procedure.       Traci Montalvo MD      [id number]  D:  2019 15:57  T:  2019 1:11  JOB #:  [id number]

## 2019-05-29 NOTE — DIABETES MGMT
[facility]    [facility] Progress Note    Recommendations/ Comments: Most BG's < 180 mg/dL with the exception of one result > 200 mg/dL yesterday afternoon. Steroids changed from Solu Cortef to Prednisone 5 mg daily today     If appropriate, please consider   Continue to observe response - may no longer need Lantus and may need change to normal correction scale if BG's continue to improve with tapered steroids and once off of them    Current hospital DM medication:   Lantus 8 units daily  Lispro resistant correction scale    Chart reviewed on [facility]. Patient is a 66 y.o. female with a history of gestational diabetes, newly diagnosed with pre-diabetes. DTC provided education 5/28/2019. Encouraged her to follow up with PCP in 3 months for a repeat A1c. A1C done one day after steroids began. Pt has been followed by Dr John Woods, Endocrinologist for endocrine derangement during this hospitalization. Taken emergently for Impella placement. This was removed 5/27/2019. A1c:   Lab Results   Component Value Date/Time    Hemoglobin A1c 6.4 (H) 05/19/2019 08:11 AM       Recent Glucose Results:   Lab Results   Component Value Date/Time     (H) 05/29/2019 03:43 AM    GLUCPOC 188 (H) 05/29/2019 02:51 PM    GLUCPOC 163 (H) 05/28/2019 09:47 PM    GLUCPOC 279 (H) 05/28/2019 04:38 PM        Lab Results   Component Value Date/Time    Creatinine 0.99 05/29/2019 03:43 AM     Estimated Creatinine Clearance: 52.4 mL/min (based on SCr of 0.99 mg/dL). Active Orders   Diet    DIET CARDIAC Regular; No Conc. Sweets        PO intake:   Patient Vitals for the past 72 hrs:   % Diet Eaten   05/29/19 1300 75 %   05/28/19 1800 100 %   05/28/19 1600 100 %   05/28/19 1400 100 %   05/27/19 1900 100 %   05/27/19 1330 100 %   05/27/19 0900 100 %   05/26/19 1800 100 %       Will continue to follow as needed.     Thank you  Krystal Hylton RN, CDE        Time spent: 8 min

## 2019-05-29 NOTE — PROGRESS NOTES
0800 - Bedside and Verbal shift change report given to me (oncoming nurse) by Rasheed Campos RN (offgoing nurse). Report included the following information SBAR, Kardex, Procedure Summary, Intake/Output, MAR, Recent Results, Med Rec Status and Cardiac Rhythm SR. Pt denies c/o pain, SOB. Sitting up in chair. 0900 - F team rounding, will start Entresto today. Will continue to monitor SVR. 1030 - SVR down from 2213 to 1313 now; F MD & NP notified. 1100 - SBP in th 70's and MAP < 60. Pt conversive, oriented, feels slightly dizzy and minimally diaphoretic. Placed in bed w/ HOB flat and MD notified. NS bolus ordered and po antihypertensives readjusted. 1130 - pt states she's feeling better; denies dizziness or anymore sweating. 1300 - pt up to chair for lunch; SBP >100 and MAP >= 65.    1700 - pt ambulated around unit, BP stable pre & post.    1800 - family at bedside visiting, patient eating dinner. 1900 - left art line and swan dc'd without difficulty. 2000 - Bedside and Verbal shift change report given to Airam Ramires RN(oncoming nurse) by me (offgoing nurse).  Report included the following information SBAR, Kardex, Procedure Summary, Intake/Output, MAR, Recent Results, Med Rec Status and Cardiac Rhythm SR.

## 2019-05-29 NOTE — PALLIATIVE CARE
Palliative Medicine Social Work    Chart reviewed. Talked a long while with patient. No family present. She was alert and engaging. She was disappointed that line in neck could not be removed today, but grateful that she is doing so well with removal of Impella and Inotrope support. She continues to verbalize her commitment and motivation toward abstinence from smoking, drinking and other bad habits; volunteering and giving back. Validated this motivation and talked more about how difficult this will be. She mentioned plan for Lifevest, but didn't verbalize a good understanding of it's purpose; thought it was to keep her from getting too stressed when she is alone. Talked more about the nature of vest; her high risk for arrest, death. Resumed conversation about AMD which we started last week. She informed that she was still not ready to think about completing this document. Inquired more about her reservations, but she was quite vague; spoke in euphemisms; said that she felt God was in control, and that God must have kept her here for a reason. She talked about her hope that she could be a witness for others; that she would greatly sacrifice her own life if it allowed someone else to live. I talked about the reality of medical protocols sometimes interfering with what some consider God's will; the importance of giving some thought about what she wants her end-of-life to look; and also of making sure providers are talking to the person she trusts most.  She now states that she is not sure she wants her friend Luis Alberto Bray to have mPOA. Patient was able to verbalize and understanding of the importance; sates she would not want to die  In ICU on vent, but still not ready to make decisions. Patient is open to ongoing conversations. Thank you for the opportunity to be involved in the care of Ms. Luis Otero. Nuria Adams, [employer], [facility]-  Palliative Medicine   Respecting Choices ® ACP [facility]   [phone]
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
## 2019-05-29 NOTE — PROGRESS NOTES
[facility] ICU Progress Note    Admit Date: 2019  POD:  8 Day Post-Op    Procedure:  Procedure(s):  IMPELLA INSERTION RIGHT SUBCLAVIAN . TRANSESOPHAGEAL ECHOCARDIOGRAM BY DR. JACOB [facility]. Subjective:   Pt seen with Dr. Colleen Blackwood. Denies complaints. On RA, NSR. Objective:   Vitals:  Blood pressure 104/51, pulse 77, temperature 98.6 °F (37 °C), resp. rate 15, height 5' 4\" (1.626 m), weight 115 lb 4.8 oz (52.3 kg), last menstrual period 2011, SpO2 97 %. Temp (24hrs), Av.2 °F (36.8 °C), Min:97.6 °F (36.4 °C), Max:98.9 °F (37.2 °C)    Hemodynamics:   CO: CO (l/min): 3 l/min   CI: CI (l/min/m2): 1.9 l/min/m2   CVP: CVP (mmHg): 3 mmHg (19 08)   SVR: SVR (dyne*sec)/cm5: 8866 (dyne*sec)/cm5 (19 2449)   PAP Systolic: PAP Systolic: 28 (86/80/70 1185)   PAP Diastolic: PAP Diastolic: 17 (52/36/55 3689)   PVR:     SV02: SVO2 (%): (Unable to calibrate/drawback on PA catheter. MD aware.) (19)   SCV02:      EKG/Rhythm:  NSR 80s    Oxygen Therapy:  Oxygen Therapy  O2 Sat (%): 97 % (19 0800)  Pulse via Oximetry: 77 beats per minute (19 0800)  O2 Device: Room air (19 07)  O2 Flow Rate (L/min): 2 l/min (19 2100)  O2 Temperature: 39.2 °F (4 °C) (19 0600)  FIO2 (%): 40 % (19)    CXR:   CXR Results  (Last 48 hours)               19 05  XR CHEST PORT Final result    Impression:  IMPRESSION:   1. No change in the position of the [place]-Krishan catheter. 2. Interval removal of the impella catheter. 3. Evidence of pulmonary hyperaeration. Narrative:  Chest single view dated 2019       Comparison chest dated 2019       History is PAC placement       A single portable AP upright view of the chest was obtained. The [place]-Krishan   catheter remains unchanged in position. There has been removal of the impella   catheter. Metallic skin staples project over the upper portion of the right   hemithorax. There is hyperaeration of the lungs.  No areas of lobar consolidation   are identified. 05/28/19 1010  XR CHEST PORT Final result    Impression:      Stable support devices. Clear lungs. Narrative:  EXAM:  XR CHEST PORT       INDICATION: Pulmonary artery catheter. COMPARISON: 5/28/2019 at 0403 hours       TECHNIQUE: Portable AP upright chest view at 0953 hours       FINDINGS: The right IJ pulmonary artery catheter and Impella catheter are   stable. Cardiac monitoring wires overlie the thorax. The cardiomediastinal   contours are stable. The pulmonary vasculature is within normal limits. The lungs and pleural spaces are clear. There is no pneumothorax. The bones and   upper abdomen are stable. 05/28/19 0509  XR CHEST PORT Final result    Impression:  Clear lungs. Narrative:  PORTABLE CHEST RADIOGRAPH/S: 5/28/2019 5:09 AM       INDICATION: Pulmonary arterial catheter placement. COMPARISON: 5/27/2019, 5/26/2019, 5/24/2019. TECHNIQUE: Portable frontal semiupright radiograph/s of the chest.       FINDINGS:    A pulmonary arterial catheter via a right IJ sheath and a catheter-based LVAD   device are in place. The lungs are clear. The central airways are patent. No   pneumothorax or pleural effusion. Admission Weight: Last Weight   Weight: 125 lb (56.7 kg) Weight: 115 lb 4.8 oz (52.3 kg)     Intake / Output / Drain:  Current Shift: No intake/output data recorded. Last 24 hrs.:     Intake/Output Summary (Last 24 hours) at 5/29/2019 7958  Last data filed at 5/29/2019 0700  Gross per 24 hour   Intake 1076.8 ml   Output 1025 ml   Net 51.8 ml       EXAM:  General:  NAD, alert                                                                                           Lungs:   Clear to auscultation bilaterally. Incision:  dsg cdi   Heart:  Regular rate and rhythm, S1, S2 normal, no murmur, click, rub or gallop. Abdomen:   Soft, non-tender. Bowel sounds normal. No masses,  No organomegaly. Extremities:  No edema. PPP. Neurologic:  Gross motor and sensory apparatus intact. Labs:   Recent Labs     19  0343 19  2147  19  0355   WBC  --   --   --  17.3*   HGB  --   --   --  10.2*   HCT  --   --   --  31.6*   PLT  --   --   --  178     --   --   --    K 4.4  --   --   --    BUN 35*  --   --   --    CREA 0.99  --   --   --    *  --   --   --    GLUCPOC  --  163*   < >  --    INR 1.1  --   --   --     < > = values in this interval not displayed. Assessment:     Active Problems:    NSTEMI (non-ST elevated myocardial infarction) ([facility] [address] (2019)      Chest pain (2019)      Overview: Added automatically from request for surgery [id number]      CHF (congestive heart failure), NYHA class IV, acute on chronic, systolic ([facility] ()      Overview: Added automatically from request for surgery [id number]         Plan/Recommendations/Medical Decision Makin. Acute systolic heart failure (NYHA class IV on admission): Impella placed  and removed 19. AHF following. On dig, aldactone. 2. Atelectasis: Encourage I/S, on RA. 3. Endocrinological derangement: On hydrocortisone - weaning dose per AHF/endocrine. Endocrine following. 4. Hx arthritis, fibromyalgia, chronic pain, DJD  5. GERD: on protonix  6. Hx stroke: pt reports stoke in the past, no deficits  7. COPD: Current smoker, smoking cessation education. On RA, cont nebs, prn duoneb. 8. Anemia: received IV iron, monitor. 9. Hyperglycemia: on Lantus, SSI. Cont  10. Leukocytosis: on steroids, afebrile currently. Monitor   11. Dispo: Potential transfer to stepdown today. Plans per AHFS. We will sign off for now, but available for questions. Staples should be removed in 2 weeks (19). Signed By: CONNIE Arauz     Saw patient, agree with above  Risk of morbidity and mortality - high  Medical decision making - high complexity    1.  Acute systolic heart failure (NYHA class IV on admission):  dig, aldactone. 2. Atelectasis: Encourage I/S,   3. Endocrinological derangement: On hydrocortisone  AHF/endocrine. Endocrine following. 4. Hx arthritis, fibromyalgia, chronic pain, DJD  5. GERD: protonix  6. Hx stroke: pt reports stroke in the past, no deficits  7. COPD: Current smoker, smoking cessation education. 8. Anemia: rmonitor. 9. Hyperglycemia:Lantus, SSI. Cont  10.  Leukocytosis: on steroids,

## 2019-05-29 NOTE — PROGRESS NOTES
Progress Note    Patient: Danii Burgess MRN: [id number]  SSN: [id number]    YOB: 1960  Age: 66 y.o. Sex: female      Admit Date: 5/18/2019    LOS: 11 days     Subjective:     65 yo WF with non-STEMI, mild CAD, stress cardiomyopathy in the pattern of Takotsubo. Developed declining hemodynamic function 5/21/19 and required increased vasopressor support and emergent impella by CT surgery. Pt now off vasopressin ,paresh and cardene. Had impella removed  5/28/19. Feels well. Hungry. No chest pain or shortness of breath. Anxious for removal of PA catheter. Objective:     Vitals:    05/29/19 1651 05/29/19 1700 05/29/19 1712 05/29/19 1800   BP: 120/72  109/66    Pulse: 81 79 80 79   Resp: 14 17 17 18   Temp:       SpO2:       Weight:       Height:            Intake and Output:  Current Shift: 05/29 0701 - 05/29 1900  In: 901.2 [P.O.:350; I.V.:551.2]  Out: 700 [Urine:700]  Last three shifts: 05/27 1901 - 05/29 0700  In: 1409.5 [P.O.:740; I.V.:669.5]  Out: 2275 [Urine:2250]    Physical Exam:   General:  Alert, cooperative, no distress, appears older than stated age. Sitting up in chair smiling. Eyes:  Conjunctivae/corneas clear. Pupils equal   Ears:  Hearing normal   Nose: Nares normal. Septum midline. Mucosa normal. No drainage   Mouth/Throat: Lips, mucosa, normal.   Neck: Supple, symmetrical, trachea midline,  Rt IJ swan in place. no JVD. Back:   Symmetric, no curvature. Lungs:   Diminished to auscultation bilateral bases. no wheeze, rales. Heart:  Regular rate and rhythm, S1, S2 normal, no murmur, click, rub or gallop. Abdomen:   Soft, non-distended, nontender. Bowel sounds normal. No masses,  No organomegaly. Extremities: Extremities normal, atraumatic, no cyanosis or edema. Pulses: 1+ and symmetric all extremities. Skin: Skin color, texture, turgor normal. No rashes or lesions       Neurologic: CNII-XII grossly intact.  ASHWIN,        Lab/Data Review:  BMP:   Lab Results Component Value Date/Time     05/29/2019 03:43 AM    K 4.4 05/29/2019 03:43 AM     05/29/2019 03:43 AM    CO2 32 05/29/2019 03:43 AM    AGAP 5 05/29/2019 03:43 AM     (H) 05/29/2019 03:43 AM    BUN 35 (H) 05/29/2019 03:43 AM    CREA 0.99 05/29/2019 03:43 AM    GFRAA >60 05/29/2019 03:43 AM    GFRNA 58 (L) 05/29/2019 03:43 AM     CMP:   Lab Results   Component Value Date/Time     05/29/2019 03:43 AM    K 4.4 05/29/2019 03:43 AM     05/29/2019 03:43 AM    CO2 32 05/29/2019 03:43 AM    AGAP 5 05/29/2019 03:43 AM     (H) 05/29/2019 03:43 AM    BUN 35 (H) 05/29/2019 03:43 AM    CREA 0.99 05/29/2019 03:43 AM    GFRAA >60 05/29/2019 03:43 AM    GFRNA 58 (L) 05/29/2019 03:43 AM    CA 8.4 (L) 05/29/2019 03:43 AM    MG 2.1 05/29/2019 03:43 AM         Assessment:     Active Problems:    NSTEMI [facility]non[facility]ST elevated [facility] infarction) [facility] (5/18/2019)      Chest pain (5/18/2019)      Overview: Added automatically from request for surgery [id number]      CHF (congestive heart failure), NYHA class IV, acute on chronic, systolic ([facility] [address] (1/64/4836)      Overview: Added automatically from request for surgery [id number]      Abnormal TSH/Free T3  Nonobstructive, mild CAD:   Left Anterior Descending   Ost LAD to Prox LAD lesion 20% stenosed. .   Prox LAD lesion 30% stenosed. .   Mid LAD lesion 60% stenosed. .   Left Circumflex   Prox Cx to Mid Cx lesion 30% stenosed. .   Right Coronary Artery   Prox RCA to Mid RCA lesion 30% stenosed. Kalee Watts NSVT    Plan:     1. Stress cardiomyopathy/Acute HFrEF:  EF 20-25%   -most recent echo 5/28/19 shows improvement of LVEF to 50% with no RWMA. Advance medications as tolerated. Ambulate once PA catheter removed. 2.  CAD, mild: (see above)   -ASA/ statin. 3. Endocrine derangement:   -Endocrine following   -Hypothyroid, possible hypopituitarism (unlikely per endocrine), DM (A1C=6.4)    4. Tobacco abuse:    -Discussed/encouraged smoking cessation.     5. NSVT:   -No significant ectopy on telemetry. 6. Hx of cocaine use   -UDS + for cocaine on admission   -Advised abstinence.         Signed By: Destiny Sepulveda MD     May 29, 2019

## 2019-05-30 ENCOUNTER — APPOINTMENT (OUTPATIENT)
Dept: GENERAL RADIOLOGY | Age: 59
DRG: 215 | End: 2019-05-30
Attending: NURSE PRACTITIONER
Payer: MEDICARE

## 2019-05-30 LAB
ABO + RH BLD: NORMAL
ABO + RH BLD: NORMAL
ALBUMIN SERPL-MCNC: 3.3 G/DL (ref 3.5–5)
ALBUMIN SERPL-MCNC: 3.9 G/DL (ref 3.5–5)
ALBUMIN/GLOB SERPL: 0.8 {RATIO} (ref 1.1–2.2)
ALBUMIN/GLOB SERPL: 0.8 {RATIO} (ref 1.1–2.2)
ALP SERPL-CCNC: 123 U/L (ref 45–117)
ALP SERPL-CCNC: 146 U/L (ref 45–117)
ALT SERPL-CCNC: 24 U/L (ref 12–78)
ALT SERPL-CCNC: 28 U/L (ref 12–78)
ANION GAP SERPL CALC-SCNC: 6 MMOL/L (ref 5–15)
ANION GAP SERPL CALC-SCNC: 8 MMOL/L (ref 5–15)
ANION GAP SERPL CALC-SCNC: 9 MMOL/L (ref 5–15)
AST SERPL-CCNC: 39 U/L (ref 15–37)
AST SERPL-CCNC: 44 U/L (ref 15–37)
BILIRUB SERPL-MCNC: 0.3 MG/DL (ref 0.2–1)
BILIRUB SERPL-MCNC: 0.4 MG/DL (ref 0.2–1)
BLOOD GROUP ANTIBODIES SERPL: NORMAL
BLOOD GROUP ANTIBODIES SERPL: NORMAL
BUN SERPL-MCNC: 34 MG/DL (ref 6–20)
BUN SERPL-MCNC: 35 MG/DL (ref 6–20)
BUN SERPL-MCNC: 37 MG/DL (ref 6–20)
BUN/CREAT SERPL: 31 (ref 12–20)
BUN/CREAT SERPL: 35 (ref 12–20)
BUN/CREAT SERPL: 37 (ref 12–20)
CALCIUM SERPL-MCNC: 8.8 MG/DL (ref 8.5–10.1)
CALCIUM SERPL-MCNC: 9 MG/DL (ref 8.5–10.1)
CALCIUM SERPL-MCNC: 9.9 MG/DL (ref 8.5–10.1)
CHLORIDE SERPL-SCNC: 101 MMOL/L (ref 97–108)
CHLORIDE SERPL-SCNC: 102 MMOL/L (ref 97–108)
CHLORIDE SERPL-SCNC: 99 MMOL/L (ref 97–108)
CK SERPL-CCNC: 77 U/L (ref 26–192)
CK SERPL-CCNC: 90 U/L (ref 26–192)
CO2 SERPL-SCNC: 24 MMOL/L (ref 21–32)
CO2 SERPL-SCNC: 27 MMOL/L (ref 21–32)
CO2 SERPL-SCNC: 29 MMOL/L (ref 21–32)
CREAT SERPL-MCNC: 0.93 MG/DL (ref 0.55–1.02)
CREAT SERPL-MCNC: 1.07 MG/DL (ref 0.55–1.02)
CREAT SERPL-MCNC: 1.12 MG/DL (ref 0.55–1.02)
DIGOXIN SERPL-MCNC: 1.7 NG/ML (ref 0.9–2)
ERYTHROCYTE [DISTWIDTH] IN BLOOD BY AUTOMATED COUNT: 14.6 % (ref 11.5–14.5)
GLOBULIN SER CALC-MCNC: 4 G/DL (ref 2–4)
GLOBULIN SER CALC-MCNC: 4.7 G/DL (ref 2–4)
GLUCOSE BLD STRIP.AUTO-MCNC: 118 MG/DL (ref 65–100)
GLUCOSE BLD STRIP.AUTO-MCNC: 245 MG/DL (ref 65–100)
GLUCOSE BLD STRIP.AUTO-MCNC: 66 MG/DL (ref 65–100)
GLUCOSE BLD STRIP.AUTO-MCNC: 86 MG/DL (ref 65–100)
GLUCOSE BLD STRIP.AUTO-MCNC: 96 MG/DL (ref 65–100)
GLUCOSE SERPL-MCNC: 102 MG/DL (ref 65–100)
GLUCOSE SERPL-MCNC: 154 MG/DL (ref 65–100)
GLUCOSE SERPL-MCNC: 90 MG/DL (ref 65–100)
HCT VFR BLD AUTO: 39.8 % (ref 35–47)
HGB BLD-MCNC: 13.1 G/DL (ref 11.5–16)
INR PPP: 1.1 (ref 0.9–1.1)
LACTATE SERPL-SCNC: 1 MMOL/L (ref 0.4–2)
LACTATE SERPL-SCNC: 1.1 MMOL/L (ref 0.4–2)
LDH SERPL L TO P-CCNC: 524 U/L (ref 81–246)
LDH SERPL L TO P-CCNC: >4000 U/L (ref 81–246)
MCH RBC QN AUTO: 29 PG (ref 26–34)
MCHC RBC AUTO-ENTMCNC: 32.9 G/DL (ref 30–36.5)
MCV RBC AUTO: 88.1 FL (ref 80–99)
MYOGLOBIN SERPL-MCNC: 57 NG/ML (ref 13–71)
NRBC # BLD: 0.02 K/UL (ref 0–0.01)
NRBC BLD-RTO: 0.1 PER 100 WBC
PLATELET # BLD AUTO: 375 K/UL (ref 150–400)
PMV BLD AUTO: 10 FL (ref 8.9–12.9)
POTASSIUM SERPL-SCNC: 5 MMOL/L (ref 3.5–5.1)
POTASSIUM SERPL-SCNC: 5.2 MMOL/L (ref 3.5–5.1)
POTASSIUM SERPL-SCNC: 5.8 MMOL/L (ref 3.5–5.1)
POTASSIUM SERPL-SCNC: 5.9 MMOL/L (ref 3.5–5.1)
PROCALCITONIN SERPL-MCNC: <0.1 NG/ML
PROT SERPL-MCNC: 7.3 G/DL (ref 6.4–8.2)
PROT SERPL-MCNC: 8.6 G/DL (ref 6.4–8.2)
PROTHROMBIN TIME: 10.8 SEC (ref 9–11.1)
RBC # BLD AUTO: 4.52 M/UL (ref 3.8–5.2)
SERVICE CMNT-IMP: ABNORMAL
SERVICE CMNT-IMP: ABNORMAL
SERVICE CMNT-IMP: NORMAL
SODIUM SERPL-SCNC: 133 MMOL/L (ref 136–145)
SODIUM SERPL-SCNC: 135 MMOL/L (ref 136–145)
SODIUM SERPL-SCNC: 137 MMOL/L (ref 136–145)
SPECIMEN EXP DATE BLD: NORMAL
SPECIMEN EXP DATE BLD: NORMAL
T4 FREE SERPL-MCNC: 1.3 NG/DL (ref 0.8–1.5)
T4 FREE SERPL-MCNC: 1.4 NG/DL (ref 0.8–1.5)
WBC # BLD AUTO: 18.7 K/UL (ref 3.6–11)

## 2019-05-30 PROCEDURE — 84439 ASSAY OF FREE THYROXINE: CPT

## 2019-05-30 PROCEDURE — 84145 PROCALCITONIN (PCT): CPT

## 2019-05-30 PROCEDURE — 74011250637 HC RX REV CODE- 250/637: Performed by: HOSPITALIST

## 2019-05-30 PROCEDURE — 84132 ASSAY OF SERUM POTASSIUM: CPT

## 2019-05-30 PROCEDURE — 83615 LACTATE (LD) (LDH) ENZYME: CPT

## 2019-05-30 PROCEDURE — 99233 SBSQ HOSP IP/OBS HIGH 50: CPT | Performed by: INTERNAL MEDICINE

## 2019-05-30 PROCEDURE — 74011000250 HC RX REV CODE- 250: Performed by: NURSE PRACTITIONER

## 2019-05-30 PROCEDURE — 94640 AIRWAY INHALATION TREATMENT: CPT

## 2019-05-30 PROCEDURE — 36415 COLL VENOUS BLD VENIPUNCTURE: CPT

## 2019-05-30 PROCEDURE — 80162 ASSAY OF DIGOXIN TOTAL: CPT

## 2019-05-30 PROCEDURE — 74011250637 HC RX REV CODE- 250/637: Performed by: NURSE PRACTITIONER

## 2019-05-30 PROCEDURE — 74011250637 HC RX REV CODE- 250/637: Performed by: INTERNAL MEDICINE

## 2019-05-30 PROCEDURE — 85610 PROTHROMBIN TIME: CPT

## 2019-05-30 PROCEDURE — 74011636637 HC RX REV CODE- 636/637: Performed by: INTERNAL MEDICINE

## 2019-05-30 PROCEDURE — 86900 BLOOD TYPING SEROLOGIC ABO: CPT

## 2019-05-30 PROCEDURE — 71045 X-RAY EXAM CHEST 1 VIEW: CPT

## 2019-05-30 PROCEDURE — 82962 GLUCOSE BLOOD TEST: CPT

## 2019-05-30 PROCEDURE — 83605 ASSAY OF LACTIC ACID: CPT

## 2019-05-30 PROCEDURE — 80053 COMPREHEN METABOLIC PANEL: CPT

## 2019-05-30 PROCEDURE — 65660000001 HC RM ICU INTERMED STEPDOWN

## 2019-05-30 PROCEDURE — 74011000258 HC RX REV CODE- 258: Performed by: NURSE PRACTITIONER

## 2019-05-30 PROCEDURE — 85027 COMPLETE CBC AUTOMATED: CPT

## 2019-05-30 PROCEDURE — 83874 ASSAY OF MYOGLOBIN: CPT

## 2019-05-30 PROCEDURE — 74011636637 HC RX REV CODE- 636/637: Performed by: NURSE PRACTITIONER

## 2019-05-30 PROCEDURE — 82550 ASSAY OF CK (CPK): CPT

## 2019-05-30 PROCEDURE — 74011250636 HC RX REV CODE- 250/636: Performed by: NURSE PRACTITIONER

## 2019-05-30 PROCEDURE — 74011000250 HC RX REV CODE- 250: Performed by: HOSPITALIST

## 2019-05-30 RX ORDER — DEXTROSE 50 % IN WATER (D50W) INTRAVENOUS SYRINGE
25 ONCE
Status: COMPLETED | OUTPATIENT
Start: 2019-05-30 | End: 2019-05-30

## 2019-05-30 RX ORDER — CARVEDILOL 12.5 MG/1
12.5 TABLET ORAL 2 TIMES DAILY WITH MEALS
Status: CANCELLED | OUTPATIENT
Start: 2019-05-30

## 2019-05-30 RX ORDER — BACITRACIN 500 UNIT/G
PACKET (EA) TOPICAL
Status: DISPENSED
Start: 2019-05-30 | End: 2019-05-30

## 2019-05-30 RX ORDER — CLONAZEPAM 0.5 MG/1
0.5 TABLET ORAL 2 TIMES DAILY
Status: DISCONTINUED | OUTPATIENT
Start: 2019-05-30 | End: 2019-06-04 | Stop reason: HOSPADM

## 2019-05-30 RX ORDER — ALBUTEROL SULFATE 0.83 MG/ML
2.5 SOLUTION RESPIRATORY (INHALATION)
Status: COMPLETED | OUTPATIENT
Start: 2019-05-30 | End: 2019-05-30

## 2019-05-30 RX ADMIN — INSULIN GLARGINE 8 UNITS: 100 INJECTION, SOLUTION SUBCUTANEOUS at 08:56

## 2019-05-30 RX ADMIN — DEXTROSE MONOHYDRATE 25 G: 500 INJECTION PARENTERAL at 18:28

## 2019-05-30 RX ADMIN — Medication 400 MG: at 08:56

## 2019-05-30 RX ADMIN — PRAVASTATIN SODIUM 40 MG: 40 TABLET ORAL at 21:58

## 2019-05-30 RX ADMIN — SACUBITRIL AND VALSARTAN 1 TABLET: 24; 26 TABLET, FILM COATED ORAL at 08:56

## 2019-05-30 RX ADMIN — OXYCODONE HYDROCHLORIDE AND ACETAMINOPHEN 1 TABLET: 5; 325 TABLET ORAL at 15:46

## 2019-05-30 RX ADMIN — SPIRONOLACTONE 25 MG: 25 TABLET ORAL at 08:56

## 2019-05-30 RX ADMIN — ASPIRIN 81 MG: 81 TABLET ORAL at 08:56

## 2019-05-30 RX ADMIN — DIGOXIN 0.12 MG: 125 TABLET ORAL at 08:56

## 2019-05-30 RX ADMIN — PANTOPRAZOLE SODIUM 40 MG: 40 TABLET, DELAYED RELEASE ORAL at 08:56

## 2019-05-30 RX ADMIN — ALBUTEROL SULFATE 2.5 MG: 2.5 SOLUTION RESPIRATORY (INHALATION) at 18:25

## 2019-05-30 RX ADMIN — POTASSIUM CHLORIDE 40 MEQ: 750 TABLET, FILM COATED, EXTENDED RELEASE ORAL at 08:56

## 2019-05-30 RX ADMIN — CHLORHEXIDINE GLUCONATE 15 ML: 1.2 RINSE ORAL at 17:42

## 2019-05-30 RX ADMIN — INSULIN LISPRO 4 UNITS: 100 INJECTION, SOLUTION INTRAVENOUS; SUBCUTANEOUS at 12:24

## 2019-05-30 RX ADMIN — BUDESONIDE 250 MCG: 0.25 INHALANT RESPIRATORY (INHALATION) at 20:32

## 2019-05-30 RX ADMIN — PREDNISONE 5 MG: 10 TABLET ORAL at 08:56

## 2019-05-30 RX ADMIN — SPIRONOLACTONE 25 MG: 25 TABLET ORAL at 17:42

## 2019-05-30 RX ADMIN — CLONAZEPAM 0.5 MG: 0.5 TABLET ORAL at 17:41

## 2019-05-30 RX ADMIN — CALCIUM GLUCONATE 1 G: 98 INJECTION, SOLUTION INTRAVENOUS at 18:34

## 2019-05-30 RX ADMIN — HUMAN INSULIN 10 UNITS: 100 INJECTION, SOLUTION SUBCUTANEOUS at 18:27

## 2019-05-30 RX ADMIN — SODIUM CHLORIDE 250 ML: 900 INJECTION, SOLUTION INTRAVENOUS at 15:59

## 2019-05-30 RX ADMIN — POTASSIUM CHLORIDE 40 MEQ: 750 TABLET, FILM COATED, EXTENDED RELEASE ORAL at 17:42

## 2019-05-30 RX ADMIN — BUDESONIDE 250 MCG: 0.25 INHALANT RESPIRATORY (INHALATION) at 07:34

## 2019-05-30 NOTE — PROGRESS NOTES
1546: TRANSFER - IN REPORT:    Verbal report received from channing major(name) on Regan Choe  being received from cvi(unit) for routine progression of care      Report consisted of patients Situation, Background, Assessment and   Recommendations(SBAR). Information from the following report(s) SBAR, Kardex, ED Summary, Procedure Summary, Intake/Output, MAR and Recent Results was reviewed with the receiving nurse. Opportunity for questions and clarification was provided. 1606: pt arrived to floor tele placed and confirmed with monitor tech. 1930: Bedside and Verbal shift change report given to henrik wagoner rn (oncoming nurse) by shaquille puente rn (offgoing nurse). Report included the following information SBAR, Kardex, ED Summary, Procedure Summary, Intake/Output, MAR and Recent Results.

## 2019-05-30 NOTE — DIABETES MGMT
[facility]    [facility] Progress Note    Recommendations/ Comments: Hypoglycemia- BG 67 mg/dl yesterday at 1744 after receiving correction per resistant scale. Steroids changed from Solu Cortef to Prednisone 5 mg daily on 5/29/2019     If appropriate, please consider   -change lispro correction scale to normal sensitivity    Current hospital DM medication:   Lantus 8 units daily  Lispro resistant correction scale    Chart reviewed on [facility]. Patient is a 66 y.o. female with a history of gestational diabetes, newly diagnosed with pre-diabetes. DTC provided education 5/28/2019. Encouraged her to follow up with PCP in 3 months for a repeat A1c. A1C done one day after steroids began. Pt has been followed by Dr Alexa Dos Santos, Endocrinologist for endocrine derangement during this hospitalization. Taken emergently for Impella placement. This was removed 5/27/2019. A1c:   Lab Results   Component Value Date/Time    Hemoglobin A1c 6.4 (H) 05/19/2019 08:11 AM       Recent Glucose Results:   Lab Results   Component Value Date/Time    GLUCPOC 118 (H) 05/30/2019 08:15 AM    GLUCPOC 142 (H) 05/29/2019 08:45 PM    GLUCPOC 86 05/29/2019 05:47 PM        Lab Results   Component Value Date/Time    Creatinine 0.99 05/29/2019 03:43 AM     Estimated Creatinine Clearance: 51.7 mL/min (based on SCr of 0.99 mg/dL). Active Orders   Diet    DIET CARDIAC Regular; No Conc. Sweets        PO intake:   Patient Vitals for the past 72 hrs:   % Diet Eaten   05/29/19 1800 75 %   05/29/19 1300 75 %   05/29/19 0900 100 %   05/28/19 1800 100 %   05/28/19 1600 100 %   05/28/19 1400 100 %   05/27/19 1900 100 %   05/27/19 1330 100 %       Will continue to follow as needed.     Thank you  Mack Marquez RN, CDE        Time spent: 4 min

## 2019-05-30 NOTE — CONSULTS
Events noted. Patient now on 5 mg of prednisone once daily and 8 units of Lantus insulin. With the exception of one elevated blood sugar in the past 24 hours, the numbers are reasonable although certainly not at goal.  Depending on discharge plans, I am happy to see her in follow-up to address both her diabetes and her long-term need for steroids which I hope are not the case. In the meantime, I would continue the long-acting insulin and avoid reliance on sliding scale insulin.

## 2019-05-30 NOTE — PROGRESS NOTES
1550: RT at bedside for arterial stick. 1605: TRANSFER - OUT REPORT:    Verbal report given to Anabella Preciado (name) on Riveratie Cousin  being transferred to [facility] (unit) for routine progression of care       Report consisted of patients Situation, Background, Assessment and   Recommendations(SBAR). Information from the following report(s) SBAR, ED Summary, Procedure Summary, Intake/Output, MAR, Med Rec Status and Cardiac Rhythm nsr was reviewed with the receiving nurse. Lines:   Peripheral IV Anterior; Left Forearm (Active)   Site Assessment Clean, dry, & intact 5/30/2019  4:08 PM   Phlebitis Assessment 0 5/30/2019  4:08 PM   Infiltration Assessment 0 5/30/2019  4:08 PM   Dressing Status Clean, dry, & intact 5/30/2019  4:08 PM   Dressing Type Transparent;Tape 5/30/2019  4:08 PM   Hub Color/Line Status Pink; Infusing 5/30/2019  4:08 PM   Action Taken Open ports on tubing capped 5/30/2019  4:08 PM   Alcohol Cap Used Yes 5/30/2019  4:08 PM       Peripheral IV 05/30/19 Anterior;Right Forearm (Active)   Site Assessment Clean, dry, & intact 5/30/2019  4:08 PM   Phlebitis Assessment 0 5/30/2019  4:08 PM   Infiltration Assessment 0 5/30/2019  4:08 PM   Dressing Status Clean, dry, & intact 5/30/2019  4:08 PM   Dressing Type Tape;Transparent 5/30/2019  4:08 PM   Hub Color/Line Status Blue;Capped 5/30/2019  4:08 PM   Action Taken Open ports on tubing capped 5/30/2019  4:08 PM   Alcohol Cap Used Yes 5/30/2019  4:08 PM        Opportunity for questions and clarification was provided.       Patient walked to [facility] with:   Monitor  Registered Nurse

## 2019-05-30 NOTE — PROGRESS NOTES
Progress Note    Patient: Pablo Salazar MRN: [id number]  SSN: [id number]    YOB: 1960  Age: 66 y.o. Sex: female      Admit Date: 5/18/2019    LOS: 12 days     Subjective:     67 yo WF with non-STEMI, mild CAD, stress cardiomyopathy in the pattern of Takotsubo. Developed declining hemodynamic function 5/21/19 and required increased vasopressor support and emergent impella by CT surgery. Pt now off inotropes. Maximizing heart failure treatment  Had impella removed  5/28/19. Echocardiogram with gradual improvement. Feels well this morning. No chest pain or shortness of breath. PA catheter removed, although sheath in situ. Objective:     Vitals:    05/30/19 0328 05/30/19 0443 05/30/19 0734 05/30/19 0809   BP: 95/59   111/83   Pulse: 74   90   Resp: 19   17   Temp: 98.8 °F (37.1 °C)   97.7 °F (36.5 °C)   SpO2:   96% 97%   Weight:  113 lb 12.1 oz (51.6 kg)     Height:            Intake and Output:  Current Shift: 05/30 0701 - 05/30 1900  In: 480 [P.O.:480]  Out: 100 [Urine:100]  Last three shifts: 05/28 1901 - 05/30 0700  In: 2012.2 [P.O.:1125; I.V.:887.2]  Out: 2800 [Urine:2800]    Physical Exam:   General:  Alert, cooperative, no distress, appears older than stated age. Sitting up in chair smiling. Eyes:  Conjunctivae/corneas clear. Pupils equal   Ears:  Hearing normal   Nose: Nares normal. Septum midline. Mucosa normal. No drainage   Mouth/Throat: Lips, mucosa, normal.   Neck: Supple, symmetrical, trachea midline,  Rt IJ swan in place. no JVD. Back:   Symmetric, no curvature. Lungs:   Diminished to auscultation bilateral bases. no wheeze, rales. Heart:  Regular rate and rhythm, S1, S2 normal, no murmur, click, rub or gallop. Abdomen:   Soft, non-distended, nontender. Bowel sounds normal. No masses,  No organomegaly. Extremities: Extremities normal, atraumatic, no cyanosis or edema. Pulses: 1+ and symmetric all extremities.    Skin: Skin color, texture, turgor normal. No rashes or lesions       Neurologic: CNII-XII grossly intact. ASHWIN,        Lab/Data Review:  CXR 5/30/19:    Results   XR CHEST PORT (Accession [id number]) (Order [id number])   Allergies      No Known Allergies   Exam Information     Status Exam Begun  Exam Ended    Final [99] 5/30/2019 03:21 5/30/2019 05:51   Result Information     Status: Final result (Exam End: 5/30/2019 05:51) Provider Status: Open   Study Result     PORTABLE CHEST RADIOGRAPH/S: 5/30/2019 5:51 AM     INDICATION: Pulmonary arterial catheter placement.     COMPARISON: 5/29/2019, 5/28/2019, 5/27/2019.     TECHNIQUE: Portable frontal semiupright radiograph/s of the chest.     FINDINGS:   A pulmonary arterial catheter has been removed. A right IJ sheath remains. The  lungs are clear. The central airways are patent. No pneumothorax or pleural  effusion.      IMPRESSION  IMPRESSION:   Clear lungs. Imaging     XR CHEST PORT (Order: [id number]) - 5/28/2019   Result History     XR CHEST PORT (Order #[id number]) on 5/30/2019 - Order Result History Report          External Results Report   Signed by     Signed Date/Time  Phone Pager   Georgiana Rose Marie 5/30/2019 07:57 [phone]          Assessment:     Active Problems:    NSTEMI (non-ST elevated myocardial infarction) ([address] (5/18/2019)      Chest pain (5/18/2019)      Overview: Added automatically from request for surgery [id number]      CHF (congestive heart failure), NYHA class IV, acute on chronic, systolic ([address] (5/79/1009)      Overview: Added automatically from request for surgery [id number]      Abnormal TSH/Free T3  Nonobstructive, mild CAD:   Left Anterior Descending   Ost LAD to Prox LAD lesion 20% stenosed. .   Prox LAD lesion 30% stenosed. .   Mid LAD lesion 60% stenosed. .   Left Circumflex   Prox Cx to Mid Cx lesion 30% stenosed. .   Right Coronary Artery   Prox RCA to Mid RCA lesion 30% stenosed. Dionisio Reasoner NSVT    Plan:     1.  Stress cardiomyopathy/Acute HFrEF:  EF 20-25%   -most recent echo 5/28/19 shows improvement of LVEF to 50% with no RWMA. Advance medications as tolerated. Ambulate. Start discharge planning. 2.  CAD, mild: (see above)   -ASA/ statin. 3. Endocrine derangement:   -Endocrine following   -Hypothyroid, DM (A1C=6.4)    4. Tobacco abuse:    -Discussed/encouraged smoking cessation. 5. NSVT:   -No significant ectopy on telemetry. 6. Hx of cocaine use   -UDS + for cocaine on admission   -Advised abstinence.         Signed By: Mari Prather MD     May 30, 2019
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
## 2019-05-30 NOTE — PROGRESS NOTES
Bedside and Verbal shift change report given to [employer] RN (oncoming nurse) by Tasha Owens (offgoing nurse). Report included the following information SBAR, Kardex, MAR, Recent Results and Cardiac Rhythm NSR . Problem: Heart Failure: Discharge Outcomes  Goal: *Demonstrates ability to perform prescribed activity without shortness of breath or discomfort  Outcome: Progressing Towards Goal  Goal: *Left ventricular function assessment completed prior to or during stay, or planned for post-discharge  Outcome: Progressing Towards Goal     Problem: Falls - Risk of  Goal: *Absence of Falls  Description  Document Narciso Ramus Fall Risk and appropriate interventions in the flowsheet. Outcome: Progressing Towards Goal     Problem: Pressure Injury - Risk of  Goal: *Prevention of pressure injury  Description  Document Salvatore Scale and appropriate interventions in the flowsheet.   Outcome: Progressing Towards Goal     Problem: Infection - Risk of, Central Venous Catheter-Associated Bloodstream Infection  Goal: *Absence of infection signs and symptoms  Outcome: Progressing Towards Goal     Problem: Cardiac Output -  Decreased  Goal: *Vital signs within specified parameters  Outcome: Progressing Towards Goal  Goal: *Optimal cardiac output  Outcome: Progressing Towards Goal  Goal: *Absence of hypoxia  Outcome: Progressing Towards Goal  Goal: *Absence of peripheral edema  Outcome: Progressing Towards Goal  Goal: *Intravascular fluid volume and electrolyte balance  Outcome: Progressing Towards Goal

## 2019-05-30 NOTE — PROGRESS NOTES
[facility] VAD/Heart Failure Progress Note    Admit Date: 2019  POD:  2 Days Post-Op    Procedure:  Procedure(s):  IMPELLA REMOVAL        Subjective:   Mild dyspnea, fatigue, and weakness; working on bed     Objective:   Vitals:  Blood pressure 111/83, pulse 90, temperature 97.7 °F (36.5 °C), resp. rate 17, height 5' 4\" (1.626 m), weight 113 lb 12.1 oz (51.6 kg), last menstrual period 2011, SpO2 97 %. Temp (24hrs), Av.5 °F (36.9 °C), Min:97.7 °F (36.5 °C), Max:98.8 °F (37.1 °C)    Hemodynamics:   CO: CO (l/min): 3.2 l/min   CI: CI (l/min/m2): 2 l/min/m2   CVP: CVP (mmHg): 9 mmHg (19 1600)   SVR: SVR (dyne*sec)/cm5: 1382 (dyne*sec)/cm5 (19 3933)   PAP Systolic: PAP Systolic: 12 (49/95/11 7829)   PAP Diastolic: PAP Diastolic: 7 (21/10/31 8594)   PVR:     SV02: SVO2 (%): (Unable to calibrate/drawback on PA catheter. MD aware.) (19)   SCV02:      VAD Interrogation:      EKG/Rhythm:      Extubation Date / Time:     CT Output:     Ventilator:  Ventilator Volumes  Vt Set (ml): 440 ml (19)  Vt Exhaled (Machine Breath) (ml): 349 ml (19)  Ve Observed (l/min): 8.55 l/min (19)    Oxygen Therapy:  Oxygen Therapy  O2 Sat (%): 97 % (19 08)  Pulse via Oximetry: 88 beats per minute (19 0734)  O2 Device: Room air (19 0809)  O2 Flow Rate (L/min): 2 l/min (19 2100)  O2 Temperature: 39.2 °F (4 °C) (19 0600)  FIO2 (%): 40 % (19)    CXR:    Admission Weight: Last Weight   Weight: 125 lb (56.7 kg) Weight: 113 lb 12.1 oz (51.6 kg)     Intake / Output / Drain:  Current Shift:  07 - 1900  In: 480 [P.O.:480]  Out: 100 [Urine:100]  Last 24 hrs.:     Intake/Output Summary (Last 24 hours) at 2019 1207  Last data filed at 2019 0809  Gross per 24 hour   Intake 1961.17 ml   Output 2400 ml   Net -438.83 ml           No results for input(s): CPK, CKMB, TROIQ in the last 72 hours.   Recent Labs 05/30/19  0419 05/29/19  0343 05/28/19  0355 05/28/19  0354    139  --  138   K 5.2* 4.4  --  4.4   CO2 29 32  --  31   BUN 34* 35*  --  26*   CREA 0.93 0.99  --  1.08*   * 148*  --  135*   MG  --  2.1  --   --    WBC  --   --  17.3*  --    HGB  --   --  10.2*  --    HCT  --   --  31.6*  --    PLT  --   --  178  --      Recent Labs     05/30/19  0430 05/29/19 0343 05/28/19  0354   INR 1.1 1.1 1.3*   PTP 10.8 11.0 13.0*   APTT  --  22.4 48.3*     No lab exists for component: PBNP        Current Facility-Administered Medications:     bacitracin 500 unit/gram packet, , , ,     clonazePAM (KlonoPIN) tablet 0.5 mg, 0.5 mg, Oral, BID, Tevin Olivarez MD    digoxin (LANOXIN) tablet 0.125 mg, 0.125 mg, Oral, DAILY, Renate Warren NP, 0.125 mg at 05/30/19 0856    [Held by provider] carvedilol (COREG) tablet 12.5 mg, 12.5 mg, Oral, BID WITH MEALS, Leonel Warren NP, Stopped at 05/29/19 1700    sacubitril-valsartan (ENTRESTO) 24-26 mg tablet 1 Tab, 1 Tab, Oral, Q12H, Renate Warren NP, 1 Tab at 05/30/19 0856    predniSONE (DELTASONE) tablet 5 mg, 5 mg, Oral, DAILY WITH BREAKFAST, Renate Warren NP, 5 mg at 05/30/19 0856    insulin glargine (LANTUS) injection 8 Units, 8 Units, SubCUTAneous, DAILY, Kaelyn Sands MD, 8 Units at 05/30/19 0856    insulin lispro (HUMALOG) injection, , SubCUTAneous, AC&HS, Kaelyn Sands MD, Stopped at 05/29/19 2200    [Held by provider] furosemide (LASIX) tablet 40 mg, 40 mg, Oral, DAILY, Kaelyn Sands MD, 40 mg at 05/29/19 0944    chlorhexidine (PERIDEX) 0.12 % mouthwash 15 mL, 15 mL, Oral, BID, Naga Frausto MD, Stopped at 05/30/19 0900    magnesium oxide (MAG-OX) tablet 400 mg, 400 mg, Oral, DAILY, Kaelyn Sands MD, 400 mg at 05/30/19 0856    [Held by provider] hydrALAZINE (APRESOLINE) tablet 25 mg, 25 mg, Oral, TID, Renate Warren, NP, 25 mg at 05/29/19 0944    pravastatin (PRAVACHOL) tablet 40 mg, 40 mg, Oral, QHS, Samanta Ibanez M., NP, 40 mg at 05/29/19 2040    potassium chloride SR (KLOR-CON 10) tablet 40 mEq, 40 mEq, Oral, TID, Aaliyah Warren, NP, 40 mEq at 05/30/19 0856    spironolactone (ALDACTONE) tablet 25 mg, 25 mg, Oral, BID, Aaliyah Warren, NP, 25 mg at 05/30/19 0856    polyethylene glycol (MIRALAX) packet 17 g, 17 g, Oral, DAILY, David Mccabe MD, Stopped at 05/30/19 0900    senna-docusate (PERICOLACE) 8.6-50 mg per tablet 1 Tab, 1 Tab, Oral, DAILY, David Mccabe MD, Stopped at 05/30/19 0900    aspirin delayed-release tablet 81 mg, 81 mg, Oral, DAILY, Kalie Warren NP, 81 mg at 05/30/19 0856    pantoprazole (PROTONIX) tablet 40 mg, 40 mg, Oral, ACB, Aaliyah Warren, NP, 40 mg at 05/30/19 0856    0.9% sodium chloride infusion, 9 mL/hr, IntraVENous, CONTINUOUS, Jagdeep Frausto MD, Last Rate: 12 mL/hr at 05/28/19 0217, 12 mL/hr at 05/28/19 0217    glucose chewable tablet 16 g, 4 Tab, Oral, PRN, Elida Gaitanunk, NP    dextrose (D50W) injection syrg 12.5-25 g, 25-50 mL, IntraVENous, PRN, Elida Walton, NP    glucagon (GLUCAGEN) injection 1 mg, 1 mg, IntraMUSCular, PRN, Elida Plunk, NP    sodium chloride (NS) flush 5-40 mL, 5-40 mL, IntraVENous, Q8H, Chinyere Gan MD, 10 mL at 05/29/19 0527    sodium chloride (NS) flush 5-40 mL, 5-40 mL, IntraVENous, PRN, Chinyere Gan MD, 10 mL at 05/29/19 1913    nitroglycerin (NITROSTAT) tablet 0.4 mg, 0.4 mg, SubLINGual, Q5MIN PRN, Chinyere Gan MD    oxyCODONE-acetaminophen (PERCOCET) 5-325 mg per tablet 1 Tab, 1 Tab, Oral, Q4H PRN, Citlali Gallegos MD, 1 Tab at 05/29/19 2039    budesonide (PULMICORT) 250 mcg/2ml nebulizer susp, 250 mcg, Nebulization, BID RT, Chinyere Gan MD, 250 mcg at 05/30/19 0734    acetaminophen (TYLENOL) tablet 650 mg, 650 mg, Oral, Q4H PRN, Armando Bustos MD, 650 mg at 05/23/19 2049       A/P     1. Acute systolic heart failure (NYHA class IV on admission):  dig, aldactone. 2. Atelectasis: Encourage I/S,   3. Endocrinological derangement:  On hydrocortisone  AHF/endocrine. Endocrine following. 4. Hx arthritis, fibromyalgia, chronic pain, DJD  5. GERD: protonix  6. Hx stroke: pt reports stroke in the past, no deficits  7. COPD: Current smoker, smoking cessation education. 8. Anemia: rmonitor. 9. Hyperglycemia:Lantus, SSI. Cont  10.  Leukocytosis: on steroids,       Risk of morbidity and mortality - high  Medical decision making - high complexity      Signed By: Lexi Murphy MD

## 2019-05-30 NOTE — PROGRESS NOTES
0730: Bedside SBAR report received from [facility]. Labs and plan of care reviewed and gtts verified at this time. Patient delined and PIV placed. 1515: Bedside SBAR report given to [facility]. Labs and plan of care reviewed and gtts verified at this time.

## 2019-05-30 NOTE — PROGRESS NOTES
Attempted visit with Ms. Vaishnavi Mcconnell after pt's case discussed with Palliative IDT. Pt's nurse was at bedside offering care. Chaplains will attempt to visit again as able.     Kavya Vogel, Palliative

## 2019-05-31 ENCOUNTER — APPOINTMENT (OUTPATIENT)
Dept: NON INVASIVE DIAGNOSTICS | Age: 59
DRG: 215 | End: 2019-05-31
Attending: NURSE PRACTITIONER
Payer: MEDICARE

## 2019-05-31 ENCOUNTER — APPOINTMENT (OUTPATIENT)
Dept: GENERAL RADIOLOGY | Age: 59
DRG: 215 | End: 2019-05-31
Attending: NURSE PRACTITIONER
Payer: MEDICARE

## 2019-05-31 LAB
ALBUMIN SERPL-MCNC: 3.2 G/DL (ref 3.5–5)
ALBUMIN/GLOB SERPL: 0.8 {RATIO} (ref 1.1–2.2)
ALP SERPL-CCNC: 126 U/L (ref 45–117)
ALT SERPL-CCNC: 29 U/L (ref 12–78)
ANION GAP SERPL CALC-SCNC: 7 MMOL/L (ref 5–15)
APTT PPP: 22.8 SEC (ref 22.1–32)
AST SERPL-CCNC: 48 U/L (ref 15–37)
ATRIAL RATE: 64 BPM
BILIRUB SERPL-MCNC: 0.3 MG/DL (ref 0.2–1)
BNP SERPL-MCNC: 3015 PG/ML
BUN SERPL-MCNC: 34 MG/DL (ref 6–20)
BUN/CREAT SERPL: 31 (ref 12–20)
CALCIUM SERPL-MCNC: 9.2 MG/DL (ref 8.5–10.1)
CALCULATED P AXIS, ECG09: 77 DEGREES
CALCULATED R AXIS, ECG10: -55 DEGREES
CALCULATED T AXIS, ECG11: 68 DEGREES
CHLORIDE SERPL-SCNC: 98 MMOL/L (ref 97–108)
CO2 SERPL-SCNC: 29 MMOL/L (ref 21–32)
CREAT SERPL-MCNC: 1.08 MG/DL (ref 0.55–1.02)
DIAGNOSIS, 93000: NORMAL
DIGOXIN SERPL-MCNC: 1.7 NG/ML (ref 0.9–2)
ECHO LV E' LATERAL VELOCITY: 4.41 CM/S
ECHO RV TAPSE: 1.75 CM (ref 1.5–2)
ECHO TRICUSPID ANNULAR PEAK SYSTOLIC VELOCITY: 1.8 CM/S
GLOBULIN SER CALC-MCNC: 3.9 G/DL (ref 2–4)
GLUCOSE BLD STRIP.AUTO-MCNC: 101 MG/DL (ref 65–100)
GLUCOSE BLD STRIP.AUTO-MCNC: 157 MG/DL (ref 65–100)
GLUCOSE BLD STRIP.AUTO-MCNC: 184 MG/DL (ref 65–100)
GLUCOSE BLD STRIP.AUTO-MCNC: 209 MG/DL (ref 65–100)
GLUCOSE SERPL-MCNC: 105 MG/DL (ref 65–100)
INR PPP: 1.1 (ref 0.9–1.1)
LACTATE SERPL-SCNC: 1.7 MMOL/L (ref 0.4–2)
LDH SERPL L TO P-CCNC: 472 U/L (ref 81–246)
P-R INTERVAL, ECG05: 106 MS
POTASSIUM SERPL-SCNC: 5.1 MMOL/L (ref 3.5–5.1)
PROT SERPL-MCNC: 7.1 G/DL (ref 6.4–8.2)
PROTHROMBIN TIME: 10.9 SEC (ref 9–11.1)
Q-T INTERVAL, ECG07: 320 MS
QRS DURATION, ECG06: 82 MS
QTC CALCULATION (BEZET), ECG08: 330 MS
SERVICE CMNT-IMP: ABNORMAL
SODIUM SERPL-SCNC: 134 MMOL/L (ref 136–145)
T4 FREE SERPL-MCNC: 1.3 NG/DL (ref 0.8–1.5)
THERAPEUTIC RANGE,PTTT: NORMAL SECS (ref 58–77)
VENTRICULAR RATE, ECG03: 64 BPM

## 2019-05-31 PROCEDURE — 36415 COLL VENOUS BLD VENIPUNCTURE: CPT

## 2019-05-31 PROCEDURE — 82962 GLUCOSE BLOOD TEST: CPT

## 2019-05-31 PROCEDURE — 74011636637 HC RX REV CODE- 636/637: Performed by: INTERNAL MEDICINE

## 2019-05-31 PROCEDURE — 74011250637 HC RX REV CODE- 250/637: Performed by: INTERNAL MEDICINE

## 2019-05-31 PROCEDURE — 74011000250 HC RX REV CODE- 250: Performed by: HOSPITALIST

## 2019-05-31 PROCEDURE — 83615 LACTATE (LD) (LDH) ENZYME: CPT

## 2019-05-31 PROCEDURE — 85610 PROTHROMBIN TIME: CPT

## 2019-05-31 PROCEDURE — P9045 ALBUMIN (HUMAN), 5%, 250 ML: HCPCS | Performed by: NURSE PRACTITIONER

## 2019-05-31 PROCEDURE — 94640 AIRWAY INHALATION TREATMENT: CPT

## 2019-05-31 PROCEDURE — 83880 ASSAY OF NATRIURETIC PEPTIDE: CPT

## 2019-05-31 PROCEDURE — 74011250637 HC RX REV CODE- 250/637: Performed by: NURSE PRACTITIONER

## 2019-05-31 PROCEDURE — 84145 PROCALCITONIN (PCT): CPT

## 2019-05-31 PROCEDURE — 93005 ELECTROCARDIOGRAM TRACING: CPT

## 2019-05-31 PROCEDURE — 85730 THROMBOPLASTIN TIME PARTIAL: CPT

## 2019-05-31 PROCEDURE — 94664 DEMO&/EVAL PT USE INHALER: CPT

## 2019-05-31 PROCEDURE — 74011250636 HC RX REV CODE- 250/636: Performed by: NURSE PRACTITIONER

## 2019-05-31 PROCEDURE — 71045 X-RAY EXAM CHEST 1 VIEW: CPT

## 2019-05-31 PROCEDURE — 74011250636 HC RX REV CODE- 250/636

## 2019-05-31 PROCEDURE — 83605 ASSAY OF LACTIC ACID: CPT

## 2019-05-31 PROCEDURE — 65660000001 HC RM ICU INTERMED STEPDOWN

## 2019-05-31 PROCEDURE — 0399T ECHO ADULT FOLLOW-UP OR LIMITED: CPT

## 2019-05-31 PROCEDURE — P9045 ALBUMIN (HUMAN), 5%, 250 ML: HCPCS

## 2019-05-31 PROCEDURE — 74011250636 HC RX REV CODE- 250/636: Performed by: INTERNAL MEDICINE

## 2019-05-31 PROCEDURE — 99233 SBSQ HOSP IP/OBS HIGH 50: CPT | Performed by: INTERNAL MEDICINE

## 2019-05-31 PROCEDURE — 74011636637 HC RX REV CODE- 636/637: Performed by: NURSE PRACTITIONER

## 2019-05-31 PROCEDURE — 84439 ASSAY OF FREE THYROXINE: CPT

## 2019-05-31 PROCEDURE — 80053 COMPREHEN METABOLIC PANEL: CPT

## 2019-05-31 PROCEDURE — 80162 ASSAY OF DIGOXIN TOTAL: CPT

## 2019-05-31 RX ORDER — ALBUMIN HUMAN 50 G/1000ML
25 SOLUTION INTRAVENOUS
Status: COMPLETED | OUTPATIENT
Start: 2019-05-31 | End: 2019-05-31

## 2019-05-31 RX ORDER — ALBUMIN HUMAN 50 G/1000ML
SOLUTION INTRAVENOUS
Status: COMPLETED
Start: 2019-05-31 | End: 2019-05-31

## 2019-05-31 RX ORDER — PREDNISONE 10 MG/1
10 TABLET ORAL
Status: DISCONTINUED | OUTPATIENT
Start: 2019-05-31 | End: 2019-06-04 | Stop reason: HOSPADM

## 2019-05-31 RX ORDER — SODIUM CHLORIDE 9 MG/ML
250 INJECTION, SOLUTION INTRAVENOUS CONTINUOUS
Status: DISCONTINUED | OUTPATIENT
Start: 2019-05-31 | End: 2019-05-31

## 2019-05-31 RX ORDER — ALBUMIN HUMAN 50 G/1000ML
25 SOLUTION INTRAVENOUS
Status: ACTIVE | OUTPATIENT
Start: 2019-05-31 | End: 2019-06-01

## 2019-05-31 RX ADMIN — PREDNISONE 10 MG: 10 TABLET ORAL at 09:52

## 2019-05-31 RX ADMIN — INSULIN GLARGINE 8 UNITS: 100 INJECTION, SOLUTION SUBCUTANEOUS at 11:44

## 2019-05-31 RX ADMIN — SODIUM CHLORIDE 1000 ML: 900 INJECTION, SOLUTION INTRAVENOUS at 11:48

## 2019-05-31 RX ADMIN — ASPIRIN 81 MG: 81 TABLET ORAL at 11:43

## 2019-05-31 RX ADMIN — SENNOSIDES,DOCUSATE SODIUM 1 TABLET: 8.6; 5 TABLET, FILM COATED ORAL at 11:44

## 2019-05-31 RX ADMIN — Medication 10 ML: at 06:56

## 2019-05-31 RX ADMIN — ALBUMIN (HUMAN) 25 G: 12.5 INJECTION, SOLUTION INTRAVENOUS at 08:16

## 2019-05-31 RX ADMIN — BUDESONIDE 250 MCG: 0.25 INHALANT RESPIRATORY (INHALATION) at 09:53

## 2019-05-31 RX ADMIN — CHLORHEXIDINE GLUCONATE 15 ML: 1.2 RINSE ORAL at 19:04

## 2019-05-31 RX ADMIN — INSULIN LISPRO 3 UNITS: 100 INJECTION, SOLUTION INTRAVENOUS; SUBCUTANEOUS at 07:02

## 2019-05-31 RX ADMIN — PRAVASTATIN SODIUM 40 MG: 40 TABLET ORAL at 21:02

## 2019-05-31 RX ADMIN — Medication 10 ML: at 21:02

## 2019-05-31 RX ADMIN — PANTOPRAZOLE SODIUM 40 MG: 40 TABLET, DELAYED RELEASE ORAL at 06:55

## 2019-05-31 RX ADMIN — ALBUMIN (HUMAN) 25 G: 12.5 INJECTION, SOLUTION INTRAVENOUS at 09:54

## 2019-05-31 RX ADMIN — INSULIN LISPRO 4 UNITS: 100 INJECTION, SOLUTION INTRAVENOUS; SUBCUTANEOUS at 19:04

## 2019-05-31 RX ADMIN — CHLORHEXIDINE GLUCONATE 15 ML: 1.2 RINSE ORAL at 11:46

## 2019-05-31 RX ADMIN — CLONAZEPAM 0.5 MG: 0.5 TABLET ORAL at 19:04

## 2019-05-31 RX ADMIN — Medication 400 MG: at 11:44

## 2019-05-31 RX ADMIN — POLYETHYLENE GLYCOL 3350 17 G: 17 POWDER, FOR SOLUTION ORAL at 19:04

## 2019-05-31 RX ADMIN — SACUBITRIL AND VALSARTAN 1 TABLET: 24; 26 TABLET, FILM COATED ORAL at 00:27

## 2019-05-31 RX ADMIN — Medication 10 ML: at 00:29

## 2019-05-31 RX ADMIN — ALBUMIN (HUMAN) 12.5 G: 12.5 INJECTION, SOLUTION INTRAVENOUS at 08:46

## 2019-05-31 RX ADMIN — CLONAZEPAM 0.5 MG: 0.5 TABLET ORAL at 11:44

## 2019-05-31 RX ADMIN — BUDESONIDE 250 MCG: 0.25 INHALANT RESPIRATORY (INHALATION) at 19:24

## 2019-05-31 NOTE — PROGRESS NOTES
Progress Note    Patient: Efren Rojas MRN: [id number]  SSN: [id number]    YOB: 1960  Age: 66 y.o. Sex: female      Admit Date: 5/18/2019    LOS: 13 days     Subjective:     67 yo WF with non-STEMI, mild CAD, stress cardiomyopathy in the pattern of Takotsubo. Developed declining hemodynamic function 5/21/19 and required increased vasopressor support and emergent impella by CT surgery. Pt now off inotropes. Maximizing heart failure treatment  Had impella removed  5/28/19. Echocardiogram with gradual improvement. Resting comfortably this morning. No chest pain or shortness of breath. Objective:     Vitals:    05/31/19 1040 05/31/19 1100 05/31/19 1110 05/31/19 1116   BP: 95/51 93/47 (!) 79/46 103/43   Pulse: 77 82 81 81   Resp:    16   Temp:    98.4 °F (36.9 °C)   SpO2:    95%   Weight:       Height:            Intake and Output:  Current Shift: No intake/output data recorded. Last three shifts: 05/29 1901 - 05/31 0700  In: 5321 [P.O.:1440; I.V.:180]  Out: 1500 [Urine:1500]    Physical Exam:   General:  Alert, cooperative, no distress, appears older than stated age. Sitting up in chair smiling. Eyes:  Conjunctivae/corneas clear. Pupils equal   Ears:  Hearing normal   Nose: Nares normal. Septum midline. Mucosa normal. No drainage   Mouth/Throat: Lips, mucosa, normal.   Neck: Supple, symmetrical, trachea midline,  Rt IJ swan in place. no JVD. Back:   Symmetric, no curvature. Lungs:   Diminished to auscultation bilateral bases. no wheeze, rales. Heart:  Regular rate and rhythm, S1, S2 normal, no murmur, click, rub or gallop. Abdomen:   Soft, non-distended, nontender. Bowel sounds normal. No masses,  No organomegaly. Extremities: Extremities normal, atraumatic, no cyanosis or edema. Pulses: 1+ and symmetric all extremities. Skin: Skin color, texture, turgor normal. No rashes or lesions       Neurologic: CNII-XII grossly intact.  CHRISTOPHER,        Lab/Data Review:  CXR 5/30/19:    Results   XR CHEST PORT (Accession [id number]) (Order [id number])   Allergies      No Known Allergies   Exam Information     Status Exam Begun  Exam Ended    Final [99] 5/30/2019 03:21 5/30/2019 05:51   Result Information     Status: Final result (Exam End: 5/30/2019 05:51) Provider Status: Open   Study Result     PORTABLE CHEST RADIOGRAPH/S: 5/30/2019 5:51 AM     INDICATION: Pulmonary arterial catheter placement.     COMPARISON: 5/29/2019, 5/28/2019, 5/27/2019.     TECHNIQUE: Portable frontal semiupright radiograph/s of the chest.     FINDINGS:   A pulmonary arterial catheter has been removed. A right IJ sheath remains. The  lungs are clear. The central airways are patent. No pneumothorax or pleural  effusion.      IMPRESSION  IMPRESSION:   Clear lungs. Imaging     XR CHEST PORT (Order: [id number]) - 5/28/2019   Result History     XR CHEST PORT (Order #[id number]) on 5/30/2019 - Order Result History Report          External Results Report   Signed by     Signed Date/Time  Phone Pager   Raoul Aubrey 5/30/2019 07:57 [phone]          Assessment:     Active Problems:    NSTEMI (non-ST elevated myocardial infarction) ([facility] [address] (5/18/2019)      Chest pain (5/18/2019)      Overview: Added automatically from request for surgery [id number]      CHF (congestive heart failure), NYHA class IV, acute on chronic, systolic ([facility] [address] (4/06/4506)      Overview: Added automatically from request for surgery [id number]      Abnormal TSH/Free T3  Nonobstructive, mild CAD:   Left Anterior Descending   Ost LAD to Prox LAD lesion 20% stenosed. .   Prox LAD lesion 30% stenosed. .   Mid LAD lesion 60% stenosed. .   Left Circumflex   Prox Cx to Mid Cx lesion 30% stenosed. .   Right Coronary Artery   Prox RCA to Mid RCA lesion 30% stenosed. Osman Garza NSVT    Plan:     1. Stress cardiomyopathy/Acute HFrEF:  EF 20-25%   -most recent echo 5/28/19 shows improvement of LVEF to 50% with no RWMA. Advance medications as tolerated. Ambulate.  Start discharge planning. Transient hypotension this am being corrected with fluid bolus. 2.  CAD, mild: (see above)   -ASA/ statin. 3. Endocrine derangement:   -Endocrine following- steroid taper.   -Hypothyroid, DM (A1C=6.4)    4. Tobacco abuse:    -Discussed/encouraged smoking cessation. 5. NSVT:   -No significant ectopy on telemetry. 6. Hx of cocaine use   -UDS + for cocaine on admission   -Advised abstinence.         Signed By: Mari Prather MD     May 31, 2019

## 2019-05-31 NOTE — PALLIATIVE CARE
Palliative Medicine Social Work    Chart reviewed and spoke to nurse; note hypotension this morning which is now resolved. Dr. Angela Clifton and I met with patient. She was feeling well, making entries in journal.     She remains resistant to completing AMD; continues to feel this may interfere with God's Will or prevent a miracle. Multiple attempts made to dispell this myth; to express the importance of making sure her agent is the person she trusts the most.     Our team will follow peripherally for now; not sure she will change her mind before discharge. Thank you for the opportunity to be involved in the care of Ms. Catalina Dwyer. Nuria Adams, [employer], [facility]-  Palliative Medicine   [employer]pecting Choices ® ACP Facilitator   [phone]

## 2019-05-31 NOTE — PROGRESS NOTES
[facility] VAD/Heart Failure Progress Note    Admit Date: 2019  POD:  3 Days Post-Op    Procedure:  Procedure(s):  IMPELLA REMOVAL        Subjective:   Mild dyspnea, fatigue, and weakness; hypotension this am - getting fluids     Objective:   Vitals:  Blood pressure (!) 79/46, pulse 81, temperature 97.9 °F (36.6 °C), resp. rate 18, height 5' 4\" (1.626 m), weight 110 lb 10.7 oz (50.2 kg), last menstrual period 2011, SpO2 98 %. Temp (24hrs), Av.8 °F (36.6 °C), Min:97.4 °F (36.3 °C), Max:98.4 °F (36.9 °C)    Hemodynamics:   CO: CO (l/min): 3.2 l/min   CI: CI (l/min/m2): 2 l/min/m2   CVP: CVP (mmHg): 9 mmHg (19 1600)   SVR: SVR (dyne*sec)/cm5: 1382 (dyne*sec)/cm5 (19 5141)   PAP Systolic: PAP Systolic: 12 (43/98/73 9980)   PAP Diastolic: PAP Diastolic: 7 (58/12/04 6567)   PVR:     SV02: SVO2 (%): (Unable to calibrate/drawback on PA catheter. MD aware.) (19)   SCV02:      VAD Interrogation:      EKG/Rhythm:      Extubation Date / Time:     CT Output:     Ventilator:  Ventilator Volumes  Vt Set (ml): 440 ml (19)  Vt Exhaled (Machine Breath) (ml): 349 ml (19)  Ve Observed (l/min): 8.55 l/min (19)    Oxygen Therapy:  Oxygen Therapy  O2 Sat (%): 98 % (19)  Pulse via Oximetry: 83 beats per minute (19)  O2 Device: Room air (19)  O2 Flow Rate (L/min): 2 l/min (19 2100)  O2 Temperature: 39.2 °F (4 °C) (19 0600)  FIO2 (%): 40 % (19 191)    CXR:    Admission Weight: Last Weight   Weight: 125 lb (56.7 kg) Weight: 110 lb 10.7 oz (50.2 kg)     Intake / Output / Drain:  Current Shift: No intake/output data recorded.   Last 24 hrs.:     Intake/Output Summary (Last 24 hours) at 2019 1117  Last data filed at 2019 0013  Gross per 24 hour   Intake 960 ml   Output 300 ml   Net 660 ml           Recent Labs     19  1522 19  1324   CPK 77 90     Recent Labs     19  6316 05/30/19  2220 05/30/19  1522 05/30/19  1324  05/29/19  0343   *  --  133* 135*   < > 139   K 5.1 5.0 5.8* 5.9*   < > 4.4   CO2 29  --  24 27   < > 32   BUN 34*  --  37* 35*   < > 35*   CREA 1.08*  --  1.07* 1.12*   < > 0.99   *  --  90 154*   < > 148*   MG  --   --   --   --   --  2.1   WBC  --   --  18.7*  --   --   --    HGB  --   --  13.1  --   --   --    HCT  --   --  39.8  --   --   --    PLT  --   --  375  --   --   --     < > = values in this interval not displayed.      Recent Labs     05/31/19  0337 05/30/19  0430 05/29/19  0343   INR 1.1 1.1 1.1   PTP 10.9 10.8 11.0   APTT 22.8  --  22.4     No lab exists for component: PBNP        Current Facility-Administered Medications:     0.9% sodium chloride infusion 250 mL, 250 mL, IntraVENous, CONTINUOUS, Jamila Warren NP    predniSONE (DELTASONE) tablet 10 mg, 10 mg, Oral, DAILY WITH BREAKFAST, Jamila Warren NP, 10 mg at 05/31/19 0391    clonazePAM (KlonoPIN) tablet 0.5 mg, 0.5 mg, Oral, BID, J Luis Sanders MD, 0.5 mg at 05/30/19 1741    [Held by provider] carvedilol (COREG) tablet 12.5 mg, 12.5 mg, Oral, BID WITH MEALS, Campos Warren NP, Stopped at 05/29/19 1700    insulin glargine (LANTUS) injection 8 Units, 8 Units, SubCUTAneous, DAILY, Kaelyn Sands MD, 8 Units at 05/30/19 0856    insulin lispro (HUMALOG) injection, , SubCUTAneous, AC&HS, Kaelyn Sands MD, 3 Units at 05/31/19 0702    [Held by provider] furosemide (LASIX) tablet 40 mg, 40 mg, Oral, DAILY, Kaelyn Sands MD, 40 mg at 05/29/19 0944    chlorhexidine (PERIDEX) 0.12 % mouthwash 15 mL, 15 mL, Oral, BID, Sahil Frausto MD, 15 mL at 05/30/19 1742    magnesium oxide (MAG-OX) tablet 400 mg, 400 mg, Oral, DAILY, Kaelyn Sands MD, 400 mg at 05/30/19 0856    [Held by provider] hydrALAZINE (APRESOLINE) tablet 25 mg, 25 mg, Oral, TID, Jamila Warren T, NP, 25 mg at 05/29/19 0944    pravastatin (PRAVACHOL) tablet 40 mg, 40 mg, Oral, QHS, Carina Ibanez [facility] MJuan, NP, 40 mg at 05/30/19 2158    polyethylene glycol (MIRALAX) packet 17 g, 17 g, Oral, DAILY, Zonia Petersen MD, Stopped at 05/30/19 0900    senna-docusate (PERICOLACE) 8.6-50 mg per tablet 1 Tab, 1 Tab, Oral, DAILY, Zonia Petersen MD, Stopped at 05/30/19 0900    aspirin delayed-release tablet 81 mg, 81 mg, Oral, DAILY, Aldo Warren NP, 81 mg at 05/30/19 0856    pantoprazole (PROTONIX) tablet 40 mg, 40 mg, Oral, ACB, Aldo Warren NP, 40 mg at 05/31/19 0655    0.9% sodium chloride infusion, 9 mL/hr, IntraVENous, CONTINUOUS, Julito Frausto MD, Last Rate: 12 mL/hr at 05/28/19 0217, 12 mL/hr at 05/28/19 0217    glucose chewable tablet 16 g, 4 Tab, Oral, PRN, Da Servin, NP    dextrose (D50W) injection syrg 12.5-25 g, 25-50 mL, IntraVENous, PRN, Da Servin NP    glucagon (GLUCAGEN) injection 1 mg, 1 mg, IntraMUSCular, PRN, Da Servin NP    sodium chloride (NS) flush 5-40 mL, 5-40 mL, IntraVENous, Q8H, Chinyere Gan MD, 10 mL at 05/31/19 0656    sodium chloride (NS) flush 5-40 mL, 5-40 mL, IntraVENous, PRN, Chinyere Gan MD, 10 mL at 05/29/19 1913    nitroglycerin (NITROSTAT) tablet 0.4 mg, 0.4 mg, SubLINGual, Q5MIN PRN, Chinyere Gan MD    oxyCODONE-acetaminophen (PERCOCET) 5-325 mg per tablet 1 Tab, 1 Tab, Oral, Q4H PRN, Jc Gan MD, 1 Tab at 05/30/19 1546    budesonide (PULMICORT) 250 mcg/2ml nebulizer susp, 250 mcg, Nebulization, BID RT, Chinyere Gan MD, 250 mcg at 05/31/19 0953    acetaminophen (TYLENOL) tablet 650 mg, 650 mg, Oral, Q4H PRN, Dale Mendoza MD, 650 mg at 05/23/19 2049       A/P      1. Acute systolic heart failure (NYHA class IV on admission):  dig, aldactone. 2. Atelectasis: Encourage I/S,   3. Endocrinological derangement: On hydrocortisone  AHF/endocrine. Endocrine following. 4. Hx arthritis, fibromyalgia, chronic pain, DJD  5. GERD: protonix  6. Hx stroke: pt reports stroke in the past, no deficits  7. COPD: Current smoker, smoking cessation education. 8. Anemia: rmonitor. 9. Hyperglycemia:Lantus, SSI. Cont  10.  Leukocytosis: on steroids,        Risk of morbidity and mortality - high  Medical decision making - high complexity           Signed By: Burt Robles MD

## 2019-05-31 NOTE — PROGRESS NOTES
1930: Bedside and Verbal shift change report given to Nery Fischer RN (oncoming nurse) by Maggy Mahan RN (offgoing nurse). Report included the following information SBAR, Kardex, ED Summary, Procedure Summary, Intake/Output, MAR, Recent Results and Cardiac Rhythm NSR - Sinus tach.
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
## 2019-05-31 NOTE — PROGRESS NOTES
0730  Bedside shift change report given to CARLA Griffith  (oncoming nurse) by Erick Mcallister RN  (offgoing nurse). Report included the following information SBAR, Kardex, Procedure Summary, Intake/Output, MAR, Recent Results and Med Rec Status. 0800 Patients blood pressure 61/36 and HR 80 asymptomatic. Paged Heart Failure. 0815 HF ordered 25g Albumin STAT  0835 HR 68  BP 80/51, Vikas Castillo NP at bedside. 0855 HR 60 PP 76/39  0915 HR 64  BP 76/38  0935 HR 66  BP 79/32 Bolus ordered. 0940 HR 59 BP 92/43   1116 HR 81  /43                        Cardiac Surgery Shift Summary:    1. I/O's: Intake:   Meals: 25-50% of each meal (fair)     Output: WDL (voiding without difficulty)   Has patient had BM since surgery? Yes    2. Oxygen Requirements: WDL (on room air)    3. Daily Weights (weight change in 24 hours): Weight loss (comment) -1.4kg    4.  Medication Administration: No variations to medication administration

## 2019-06-01 ENCOUNTER — APPOINTMENT (OUTPATIENT)
Dept: GENERAL RADIOLOGY | Age: 59
DRG: 215 | End: 2019-06-01
Attending: NURSE PRACTITIONER
Payer: MEDICARE

## 2019-06-01 LAB
ALBUMIN SERPL-MCNC: 3.9 G/DL (ref 3.5–5)
ALBUMIN/GLOB SERPL: 1.1 {RATIO} (ref 1.1–2.2)
ALP SERPL-CCNC: 112 U/L (ref 45–117)
ALT SERPL-CCNC: 37 U/L (ref 12–78)
ANION GAP SERPL CALC-SCNC: 6 MMOL/L (ref 5–15)
APTT PPP: 22.6 SEC (ref 22.1–32)
AST SERPL-CCNC: 48 U/L (ref 15–37)
BILIRUB SERPL-MCNC: 0.6 MG/DL (ref 0.2–1)
BNP SERPL-MCNC: 3364 PG/ML
BUN SERPL-MCNC: 33 MG/DL (ref 6–20)
BUN/CREAT SERPL: 28 (ref 12–20)
CALCIUM SERPL-MCNC: 9.3 MG/DL (ref 8.5–10.1)
CHLORIDE SERPL-SCNC: 100 MMOL/L (ref 97–108)
CO2 SERPL-SCNC: 29 MMOL/L (ref 21–32)
CREAT SERPL-MCNC: 1.16 MG/DL (ref 0.55–1.02)
DIGOXIN SERPL-MCNC: 1.3 NG/ML (ref 0.9–2)
GLOBULIN SER CALC-MCNC: 3.4 G/DL (ref 2–4)
GLUCOSE BLD STRIP.AUTO-MCNC: 110 MG/DL (ref 65–100)
GLUCOSE BLD STRIP.AUTO-MCNC: 112 MG/DL (ref 65–100)
GLUCOSE BLD STRIP.AUTO-MCNC: 117 MG/DL (ref 65–100)
GLUCOSE BLD STRIP.AUTO-MCNC: 177 MG/DL (ref 65–100)
GLUCOSE SERPL-MCNC: 113 MG/DL (ref 65–100)
INR PPP: 1.1 (ref 0.9–1.1)
LACTATE SERPL-SCNC: 1 MMOL/L (ref 0.4–2)
LDH SERPL L TO P-CCNC: 393 U/L (ref 81–246)
POTASSIUM SERPL-SCNC: 4.6 MMOL/L (ref 3.5–5.1)
PROCALCITONIN SERPL-MCNC: <0.1 NG/ML
PROCALCITONIN SERPL-MCNC: <0.1 NG/ML
PROT SERPL-MCNC: 7.3 G/DL (ref 6.4–8.2)
PROTHROMBIN TIME: 10.8 SEC (ref 9–11.1)
SERVICE CMNT-IMP: ABNORMAL
SODIUM SERPL-SCNC: 135 MMOL/L (ref 136–145)
T4 FREE SERPL-MCNC: 1.3 NG/DL (ref 0.8–1.5)
THERAPEUTIC RANGE,PTTT: NORMAL SECS (ref 58–77)

## 2019-06-01 PROCEDURE — 83615 LACTATE (LD) (LDH) ENZYME: CPT

## 2019-06-01 PROCEDURE — 83605 ASSAY OF LACTIC ACID: CPT

## 2019-06-01 PROCEDURE — 74011250637 HC RX REV CODE- 250/637: Performed by: NURSE PRACTITIONER

## 2019-06-01 PROCEDURE — 83880 ASSAY OF NATRIURETIC PEPTIDE: CPT

## 2019-06-01 PROCEDURE — 36415 COLL VENOUS BLD VENIPUNCTURE: CPT

## 2019-06-01 PROCEDURE — 85610 PROTHROMBIN TIME: CPT

## 2019-06-01 PROCEDURE — 74011636637 HC RX REV CODE- 636/637: Performed by: NURSE PRACTITIONER

## 2019-06-01 PROCEDURE — 85730 THROMBOPLASTIN TIME PARTIAL: CPT

## 2019-06-01 PROCEDURE — 94640 AIRWAY INHALATION TREATMENT: CPT

## 2019-06-01 PROCEDURE — 74011636637 HC RX REV CODE- 636/637: Performed by: INTERNAL MEDICINE

## 2019-06-01 PROCEDURE — 99233 SBSQ HOSP IP/OBS HIGH 50: CPT | Performed by: INTERNAL MEDICINE

## 2019-06-01 PROCEDURE — 84439 ASSAY OF FREE THYROXINE: CPT

## 2019-06-01 PROCEDURE — 71045 X-RAY EXAM CHEST 1 VIEW: CPT

## 2019-06-01 PROCEDURE — 80053 COMPREHEN METABOLIC PANEL: CPT

## 2019-06-01 PROCEDURE — 84145 PROCALCITONIN (PCT): CPT

## 2019-06-01 PROCEDURE — 74011250637 HC RX REV CODE- 250/637: Performed by: INTERNAL MEDICINE

## 2019-06-01 PROCEDURE — 82962 GLUCOSE BLOOD TEST: CPT

## 2019-06-01 PROCEDURE — 65660000001 HC RM ICU INTERMED STEPDOWN

## 2019-06-01 PROCEDURE — 80162 ASSAY OF DIGOXIN TOTAL: CPT

## 2019-06-01 PROCEDURE — 74011000250 HC RX REV CODE- 250: Performed by: HOSPITALIST

## 2019-06-01 RX ORDER — LISINOPRIL 5 MG/1
2.5 TABLET ORAL DAILY
Status: DISCONTINUED | OUTPATIENT
Start: 2019-06-01 | End: 2019-06-01

## 2019-06-01 RX ORDER — LISINOPRIL 5 MG/1
2.5 TABLET ORAL
Status: DISCONTINUED | OUTPATIENT
Start: 2019-06-02 | End: 2019-06-02

## 2019-06-01 RX ORDER — CARVEDILOL 3.12 MG/1
3.12 TABLET ORAL 2 TIMES DAILY WITH MEALS
Status: DISCONTINUED | OUTPATIENT
Start: 2019-06-01 | End: 2019-06-02

## 2019-06-01 RX ADMIN — Medication 10 ML: at 06:21

## 2019-06-01 RX ADMIN — INSULIN GLARGINE 8 UNITS: 100 INJECTION, SOLUTION SUBCUTANEOUS at 09:17

## 2019-06-01 RX ADMIN — PANTOPRAZOLE SODIUM 40 MG: 40 TABLET, DELAYED RELEASE ORAL at 06:24

## 2019-06-01 RX ADMIN — ASPIRIN 81 MG: 81 TABLET ORAL at 09:17

## 2019-06-01 RX ADMIN — INSULIN LISPRO 3 UNITS: 100 INJECTION, SOLUTION INTRAVENOUS; SUBCUTANEOUS at 11:30

## 2019-06-01 RX ADMIN — Medication 400 MG: at 09:17

## 2019-06-01 RX ADMIN — CLONAZEPAM 0.5 MG: 0.5 TABLET ORAL at 18:55

## 2019-06-01 RX ADMIN — Medication 10 ML: at 22:09

## 2019-06-01 RX ADMIN — PREDNISONE 10 MG: 10 TABLET ORAL at 09:17

## 2019-06-01 RX ADMIN — CHLORHEXIDINE GLUCONATE 15 ML: 1.2 RINSE ORAL at 09:18

## 2019-06-01 RX ADMIN — BUDESONIDE 250 MCG: 0.25 INHALANT RESPIRATORY (INHALATION) at 07:19

## 2019-06-01 RX ADMIN — CLONAZEPAM 0.5 MG: 0.5 TABLET ORAL at 09:17

## 2019-06-01 RX ADMIN — SENNOSIDES,DOCUSATE SODIUM 1 TABLET: 8.6; 5 TABLET, FILM COATED ORAL at 09:17

## 2019-06-01 RX ADMIN — POLYETHYLENE GLYCOL 3350 17 G: 17 POWDER, FOR SOLUTION ORAL at 09:17

## 2019-06-01 RX ADMIN — BUDESONIDE 250 MCG: 0.25 INHALANT RESPIRATORY (INHALATION) at 20:16

## 2019-06-01 RX ADMIN — PRAVASTATIN SODIUM 40 MG: 40 TABLET ORAL at 22:09

## 2019-06-01 NOTE — PROGRESS NOTES
1930: Bedside and Verbal shift change report given to Gabrielle Wheeler RN (oncoming nurse) by Belenda Apley, RN (offgoing nurse). Report included the following information SBAR, Kardex, ED Summary, Intake/Output, MAR, Recent Results and Med Rec Status. 0730: Bedside and Verbal shift change report given to Belenda Apley, RN (oncoming nurse) by Gabrielle Wheeler RN (offgoing nurse). Report included the following information SBAR, Kardex, ED Summary, Intake/Output, MAR, Recent Results and Med Rec Status.
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
## 2019-06-01 NOTE — PROGRESS NOTES
Progress Note    Patient: Efren Rojas MRN: [id number]  SSN: [id number]    YOB: 1960  Age: 66 y.o. Sex: female      Admit Date: 5/18/2019    LOS: 14 days     Subjective:     67 yo WF with non-STEMI, mild CAD, stress cardiomyopathy in the pattern of Takotsubo. Developed declining hemodynamic function 5/21/19 and required increased vasopressor support and emergent impella by CT surgery. Pt now off inotropes. Maximizing heart failure treatment  Had impella removed  5/28/19. Echocardiogram with gradual improvement. Resting comfortably this morning. No chest pain or shortness of breath. Objective:     Vitals:    05/31/19 2319 06/01/19 0259 06/01/19 0300 06/01/19 0742   BP: 132/89  103/62 113/58   Pulse: 74  86 86   Resp: 16  16 16   Temp: 97.8 °F (36.6 °C)  97.8 °F (36.6 °C) 98.2 °F (36.8 °C)   SpO2: 95%  97% 93%   Weight:  110 lb 3.7 oz (50 kg)     Height:            Intake and Output:  Current Shift: No intake/output data recorded. Last three shifts: 05/30 1901 - 06/01 0700  In: 600 [P.O.:600]  Out: 100 [Urine:100]    Physical Exam:   General:  Alert, cooperative, no distress, appears older than stated age. Sitting up in chair smiling. Eyes:  Conjunctivae/corneas clear. Pupils equal   Ears:  Hearing normal   Nose: Nares normal. Septum midline. Mucosa normal. No drainage   Mouth/Throat: Lips, mucosa, normal.   Neck: Supple, symmetrical, trachea midline,  Rt IJ swan in place. no JVD. Back:   Symmetric, no curvature. Lungs:   Diminished to auscultation bilateral bases. no wheeze, rales. Heart:  Regular rate and rhythm, S1, S2 normal, no murmur, click, rub or gallop. Abdomen:   Soft, non-distended, nontender. Bowel sounds normal. No masses,  No organomegaly. Extremities: Extremities normal, atraumatic, no cyanosis or edema. Pulses: 1+ and symmetric all extremities. Skin: Skin color, texture, turgor normal. No rashes or lesions       Neurologic: CNII-XII grossly intact. ASHWIN,        Lab/Data Review:  CXR 5/30/19:    Results   XR CHEST PORT (Accession [id number]) (Order [id number])   Allergies      No Known Allergies   Exam Information     Status Exam Begun  Exam Ended    Final [99] 5/30/2019 03:21 5/30/2019 05:51   Result Information     Status: Final result (Exam End: 5/30/2019 05:51) Provider Status: Open   Study Result     PORTABLE CHEST RADIOGRAPH/S: 5/30/2019 5:51 AM     INDICATION: Pulmonary arterial catheter placement.     COMPARISON: 5/29/2019, 5/28/2019, 5/27/2019.     TECHNIQUE: Portable frontal semiupright radiograph/s of the chest.     FINDINGS:   A pulmonary arterial catheter has been removed. A right IJ sheath remains. The  lungs are clear. The central airways are patent. No pneumothorax or pleural  effusion.      IMPRESSION  IMPRESSION:   Clear lungs. Imaging     XR CHEST PORT (Order: [id number]) - 5/28/2019   Result History     XR CHEST PORT (Order #[id number]) on 5/30/2019 - Order Result History Report          External Results Report   Signed by     Signed Date/Time  Phone Pager   Flynnnata Jael 5/30/2019 07:57 [phone]          Assessment:     Active Problems:    NSTEMI (non-ST elevated myocardial infarction) ([address] (5/18/2019)      Chest pain (5/18/2019)      Overview: Added automatically from request for surgery [id number]      CHF (congestive heart failure), NYHA class IV, acute on chronic, systolic ([address] (7/90/4931)      Overview: Added automatically from request for surgery [id number]      Abnormal TSH/Free T3  Nonobstructive, mild CAD:   Left Anterior Descending   Ost LAD to Prox LAD lesion 20% stenosed. .   Prox LAD lesion 30% stenosed. .   Mid LAD lesion 60% stenosed. .   Left Circumflex   Prox Cx to Mid Cx lesion 30% stenosed. .   Right Coronary Artery   Prox RCA to Mid RCA lesion 30% stenosed. Jordan Garsia NSVT    Plan:     1. Stress cardiomyopathy/Acute HFrEF:  EF 20-25%   -most recent echo 5/28/19 shows improvement of LVEF to 50% with no RWMA.  Add low dose BB/ACE-I as BP tolerates. 2.  CAD, mild: (see above)   -ASA/ statin. 3. Endocrine derangement:   -Endocrine following- steroid taper.   -Hypothyroid, DM (A1C=6.4)    4. Tobacco abuse:    -Discussed/encouraged smoking cessation. 5. NSVT:   -No significant ectopy on telemetry. 6. Hx of cocaine use   -UDS + for cocaine on admission   -Advised abstinence.         Signed By: Rashi Aguayo MD     June 1, 2019

## 2019-06-02 ENCOUNTER — APPOINTMENT (OUTPATIENT)
Dept: GENERAL RADIOLOGY | Age: 59
DRG: 215 | End: 2019-06-02
Attending: NURSE PRACTITIONER
Payer: MEDICARE

## 2019-06-02 LAB
ALBUMIN SERPL-MCNC: 3.7 G/DL (ref 3.5–5)
ALBUMIN/GLOB SERPL: 1.1 {RATIO} (ref 1.1–2.2)
ALP SERPL-CCNC: 125 U/L (ref 45–117)
ALT SERPL-CCNC: 60 U/L (ref 12–78)
AMPHET UR QL SCN: NEGATIVE
ANION GAP SERPL CALC-SCNC: 6 MMOL/L (ref 5–15)
APPEARANCE UR: CLEAR
APTT PPP: 23 SEC (ref 22.1–32)
AST SERPL-CCNC: 102 U/L (ref 15–37)
BACTERIA URNS QL MICRO: ABNORMAL /HPF
BARBITURATES UR QL SCN: NEGATIVE
BENZODIAZ UR QL: NEGATIVE
BILIRUB SERPL-MCNC: 0.7 MG/DL (ref 0.2–1)
BILIRUB UR QL: NEGATIVE
BNP SERPL-MCNC: 3157 PG/ML
BUN SERPL-MCNC: 31 MG/DL (ref 6–20)
BUN/CREAT SERPL: 27 (ref 12–20)
CALCIUM SERPL-MCNC: 9.3 MG/DL (ref 8.5–10.1)
CANNABINOIDS UR QL SCN: NEGATIVE
CHLORIDE SERPL-SCNC: 98 MMOL/L (ref 97–108)
CO2 SERPL-SCNC: 31 MMOL/L (ref 21–32)
COCAINE UR QL SCN: POSITIVE
COLOR UR: ABNORMAL
COMMENT, HOLDF: NORMAL
COMMENT, HOLDF: NORMAL
CREAT SERPL-MCNC: 1.16 MG/DL (ref 0.55–1.02)
DIGOXIN SERPL-MCNC: 0.7 NG/ML (ref 0.9–2)
DRUG SCRN COMMENT,DRGCM: ABNORMAL
EPITH CASTS URNS QL MICRO: ABNORMAL /LPF
GLOBULIN SER CALC-MCNC: 3.5 G/DL (ref 2–4)
GLUCOSE BLD STRIP.AUTO-MCNC: 102 MG/DL (ref 65–100)
GLUCOSE BLD STRIP.AUTO-MCNC: 121 MG/DL (ref 65–100)
GLUCOSE BLD STRIP.AUTO-MCNC: 126 MG/DL (ref 65–100)
GLUCOSE BLD STRIP.AUTO-MCNC: 340 MG/DL (ref 65–100)
GLUCOSE BLD STRIP.AUTO-MCNC: 51 MG/DL (ref 65–100)
GLUCOSE BLD STRIP.AUTO-MCNC: 59 MG/DL (ref 65–100)
GLUCOSE SERPL-MCNC: 97 MG/DL (ref 65–100)
GLUCOSE UR STRIP.AUTO-MCNC: NEGATIVE MG/DL
HGB UR QL STRIP: NEGATIVE
INR PPP: 1.1 (ref 0.9–1.1)
KETONES UR QL STRIP.AUTO: NEGATIVE MG/DL
LACTATE SERPL-SCNC: 1 MMOL/L (ref 0.4–2)
LDH SERPL L TO P-CCNC: 410 U/L (ref 81–246)
LDH SERPL L TO P-CCNC: 434 U/L (ref 81–246)
LEUKOCYTE ESTERASE UR QL STRIP.AUTO: ABNORMAL
METHADONE UR QL: NEGATIVE
NITRITE UR QL STRIP.AUTO: NEGATIVE
OPIATES UR QL: NEGATIVE
PCP UR QL: NEGATIVE
PH UR STRIP: 6.5 [PH] (ref 5–8)
POTASSIUM SERPL-SCNC: 3.8 MMOL/L (ref 3.5–5.1)
PROCALCITONIN SERPL-MCNC: 0.1 NG/ML
PROT SERPL-MCNC: 7.2 G/DL (ref 6.4–8.2)
PROT UR STRIP-MCNC: NEGATIVE MG/DL
PROTHROMBIN TIME: 11 SEC (ref 9–11.1)
RBC #/AREA URNS HPF: ABNORMAL /HPF (ref 0–5)
SAMPLES BEING HELD,HOLD: NORMAL
SAMPLES BEING HELD,HOLD: NORMAL
SERVICE CMNT-IMP: ABNORMAL
SODIUM SERPL-SCNC: 135 MMOL/L (ref 136–145)
SP GR UR REFRACTOMETRY: 1.02 (ref 1–1.03)
T4 FREE SERPL-MCNC: 1.3 NG/DL (ref 0.8–1.5)
THERAPEUTIC RANGE,PTTT: NORMAL SECS (ref 58–77)
UA: UC IF INDICATED,UAUC: ABNORMAL
UROBILINOGEN UR QL STRIP.AUTO: 1 EU/DL (ref 0.2–1)
WBC URNS QL MICRO: ABNORMAL /HPF (ref 0–4)

## 2019-06-02 PROCEDURE — 74011250637 HC RX REV CODE- 250/637: Performed by: INTERNAL MEDICINE

## 2019-06-02 PROCEDURE — 83615 LACTATE (LD) (LDH) ENZYME: CPT

## 2019-06-02 PROCEDURE — 85730 THROMBOPLASTIN TIME PARTIAL: CPT

## 2019-06-02 PROCEDURE — 71045 X-RAY EXAM CHEST 1 VIEW: CPT

## 2019-06-02 PROCEDURE — 84439 ASSAY OF FREE THYROXINE: CPT

## 2019-06-02 PROCEDURE — 80162 ASSAY OF DIGOXIN TOTAL: CPT

## 2019-06-02 PROCEDURE — 65660000001 HC RM ICU INTERMED STEPDOWN

## 2019-06-02 PROCEDURE — 74011250637 HC RX REV CODE- 250/637: Performed by: NURSE PRACTITIONER

## 2019-06-02 PROCEDURE — 80053 COMPREHEN METABOLIC PANEL: CPT

## 2019-06-02 PROCEDURE — 99233 SBSQ HOSP IP/OBS HIGH 50: CPT | Performed by: INTERNAL MEDICINE

## 2019-06-02 PROCEDURE — 74011636637 HC RX REV CODE- 636/637: Performed by: INTERNAL MEDICINE

## 2019-06-02 PROCEDURE — 80307 DRUG TEST PRSMV CHEM ANLYZR: CPT

## 2019-06-02 PROCEDURE — 36415 COLL VENOUS BLD VENIPUNCTURE: CPT

## 2019-06-02 PROCEDURE — 74011000250 HC RX REV CODE- 250: Performed by: HOSPITALIST

## 2019-06-02 PROCEDURE — 94760 N-INVAS EAR/PLS OXIMETRY 1: CPT

## 2019-06-02 PROCEDURE — 87086 URINE CULTURE/COLONY COUNT: CPT

## 2019-06-02 PROCEDURE — 81001 URINALYSIS AUTO W/SCOPE: CPT

## 2019-06-02 PROCEDURE — 74011636637 HC RX REV CODE- 636/637: Performed by: NURSE PRACTITIONER

## 2019-06-02 PROCEDURE — 83880 ASSAY OF NATRIURETIC PEPTIDE: CPT

## 2019-06-02 PROCEDURE — 85610 PROTHROMBIN TIME: CPT

## 2019-06-02 PROCEDURE — 94640 AIRWAY INHALATION TREATMENT: CPT

## 2019-06-02 PROCEDURE — 83605 ASSAY OF LACTIC ACID: CPT

## 2019-06-02 PROCEDURE — 84145 PROCALCITONIN (PCT): CPT

## 2019-06-02 PROCEDURE — 82962 GLUCOSE BLOOD TEST: CPT

## 2019-06-02 RX ORDER — CAPTOPRIL 12.5 MG/1
6.25 TABLET ORAL
Status: DISCONTINUED | OUTPATIENT
Start: 2019-06-02 | End: 2019-06-04

## 2019-06-02 RX ADMIN — CLONAZEPAM 0.5 MG: 0.5 TABLET ORAL at 18:39

## 2019-06-02 RX ADMIN — CAPTOPRIL 6.25 MG: 12.5 TABLET ORAL at 15:41

## 2019-06-02 RX ADMIN — CHLORHEXIDINE GLUCONATE 15 ML: 1.2 RINSE ORAL at 18:39

## 2019-06-02 RX ADMIN — PREDNISONE 10 MG: 10 TABLET ORAL at 08:19

## 2019-06-02 RX ADMIN — PANTOPRAZOLE SODIUM 40 MG: 40 TABLET, DELAYED RELEASE ORAL at 07:11

## 2019-06-02 RX ADMIN — PRAVASTATIN SODIUM 40 MG: 40 TABLET ORAL at 22:21

## 2019-06-02 RX ADMIN — CHLORHEXIDINE GLUCONATE 15 ML: 1.2 RINSE ORAL at 08:20

## 2019-06-02 RX ADMIN — SENNOSIDES,DOCUSATE SODIUM 1 TABLET: 8.6; 5 TABLET, FILM COATED ORAL at 09:00

## 2019-06-02 RX ADMIN — Medication 10 ML: at 22:21

## 2019-06-02 RX ADMIN — INSULIN LISPRO 10 UNITS: 100 INJECTION, SOLUTION INTRAVENOUS; SUBCUTANEOUS at 14:41

## 2019-06-02 RX ADMIN — BUDESONIDE 250 MCG: 0.25 INHALANT RESPIRATORY (INHALATION) at 19:56

## 2019-06-02 RX ADMIN — ASPIRIN 81 MG: 81 TABLET ORAL at 08:19

## 2019-06-02 RX ADMIN — Medication 400 MG: at 08:19

## 2019-06-02 RX ADMIN — Medication 10 ML: at 07:11

## 2019-06-02 RX ADMIN — INSULIN GLARGINE 8 UNITS: 100 INJECTION, SOLUTION SUBCUTANEOUS at 08:20

## 2019-06-02 RX ADMIN — CLONAZEPAM 0.5 MG: 0.5 TABLET ORAL at 08:19

## 2019-06-02 NOTE — PROGRESS NOTES
2000: Bedside and Verbal shift change report given to Gabriela Cerna RN (oncoming nurse) by MOE Sliva (offgoing nurse). Report included the following information SBAR, Kardex, ED Summary, Procedure Summary, Intake/Output, MAR, Recent Results and Med Rec Status. 0730: Bedside shift change report given to Inder Dooley RN (oncoming nurse) by Gabriela Cerna RN (offgoing nurse). Report included the following information SBAR, Kardex, Procedure Summary, Intake/Output, MAR, Recent Results and Med Rec Status.

## 2019-06-02 NOTE — PROGRESS NOTES
Progress Note    Patient: Anderson Clemente MRN: [id number]  SSN: [id number]    YOB: 1960  Age: 66 y.o. Sex: female      Admit Date: 5/18/2019    LOS: 15 days     Subjective:     67 yo WF with non-STEMI, mild CAD, stress cardiomyopathy in the pattern of Takotsubo. Developed declining hemodynamic function 5/21/19 and required increased vasopressor support and emergent impella by CT surgery. Pt now off inotropes. Maximizing heart failure treatment  Had impella removed  5/28/19. Echocardiogram with gradual improvement. Resting comfortably this morning. No chest pain or shortness of breath. Objective:     Vitals:    06/02/19 0020 06/02/19 0420 06/02/19 0732 06/02/19 1109   BP: 105/73 113/57 95/68 95/51   Pulse: 79 81 93 92   Resp: 16 15 16 16   Temp: 98 °F (36.7 °C) 97.8 °F (36.6 °C) 98.1 °F (36.7 °C) 98.3 °F (36.8 °C)   SpO2: 96% 98% 95% 96%   Weight: 109 lb 5.6 oz (49.6 kg)      Height:            Intake and Output:  Current Shift: 06/02 0701 - 06/02 1900  In: 240 [P.O.:240]  Out: -   Last three shifts: 05/31 1901 - 06/02 0700  In: 240 [P.O.:240]  Out: -     Physical Exam:   General:  Alert, cooperative, no distress, appears older than stated age. Sitting up in chair smiling. Eyes:  Conjunctivae/corneas clear. Pupils equal   Ears:  Hearing normal   Nose: Nares normal. Septum midline. Mucosa normal. No drainage   Mouth/Throat: Lips, mucosa, normal.   Neck: Supple, symmetrical, trachea midline,  Rt IJ swan in place. no JVD. Back:   Symmetric, no curvature. Lungs:   Diminished to auscultation bilateral bases. no wheeze, rales. Heart:  Regular rate and rhythm, S1, S2 normal, no murmur, click, rub or gallop. Abdomen:   Soft, non-distended, nontender. Bowel sounds normal. No masses,  No organomegaly. Extremities: Extremities normal, atraumatic, no cyanosis or edema. Pulses: 1+ and symmetric all extremities.    Skin: Skin color, texture, turgor normal. No rashes or lesions Neurologic: CNII-XII grossly intact. ASHWIN,        Lab/Data Review:  CXR 5/30/19:    Results   XR CHEST PORT (Accession [id number]) (Order [id number])   Allergies      No Known Allergies   Exam Information     Status Exam Begun  Exam Ended    Final [99] 5/30/2019 03:21 5/30/2019 05:51   Result Information     Status: Final result (Exam End: 5/30/2019 05:51) Provider Status: Open   Study Result     PORTABLE CHEST RADIOGRAPH/S: 5/30/2019 5:51 AM     INDICATION: Pulmonary arterial catheter placement.     COMPARISON: 5/29/2019, 5/28/2019, 5/27/2019.     TECHNIQUE: Portable frontal semiupright radiograph/s of the chest.     FINDINGS:   A pulmonary arterial catheter has been removed. A right IJ sheath remains. The  lungs are clear. The central airways are patent. No pneumothorax or pleural  effusion.      IMPRESSION  IMPRESSION:   Clear lungs. Imaging     XR CHEST PORT (Order: [id number]) - 5/28/2019   Result History     XR CHEST PORT (Order #[id number]) on 5/30/2019 - Order Result History Report          External Results Report   Signed by     Signed Date/Time  Phone Pager   Ye Bain 5/30/2019 07:57 [phone]          Assessment:     Active Problems:    NSTEMI (non-ST elevated myocardial infarction) ([facility] (5/18/2019)      Chest pain (5/18/2019)      Overview: Added automatically from request for surgery [id number]      CHF (congestive heart failure), NYHA class IV, acute on chronic, systolic ([address] (0/76/9381)      Overview: Added automatically from request for surgery [id number]      Abnormal TSH/Free T3  Nonobstructive, mild CAD:   Left Anterior Descending   Ost LAD to Prox LAD lesion 20% stenosed. .   Prox LAD lesion 30% stenosed. .   Mid LAD lesion 60% stenosed. .   Left Circumflex   Prox Cx to Mid Cx lesion 30% stenosed. .   Right Coronary Artery   Prox RCA to Mid RCA lesion 30% stenosed. Kalee Watts NSVT    Plan:     1.  Stress cardiomyopathy/Acute HFrEF:  EF 20-25%   -most recent echo 5/28/19 shows improvement of LVEF to 50% with no RWMA. Add low dose BB/ACE-I as BP tolerates. Dr. Bhavani Laurent to follow. 2.  CAD, mild: (see above)   -ASA/ statin. 3. Endocrine derangement:   -Endocrine consultation input appreciated. -Hypothyroid, DM (A1C=6.4)    4. Tobacco abuse:    -Discussed/encouraged smoking cessation. 5. NSVT:   -No significant ectopy on telemetry. 6. Hx of cocaine use   -UDS + for cocaine on admission   -Advised abstinence.         Signed By: Paul Arellano MD     June 2, 2019
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
## 2019-06-02 NOTE — CONSULTS
Events noted. Have discussed case with bedside RN and Dr Ness Stephenson this morning. 1. Adrenal insufficiency: almost certainly secondary now on 10 mg prednisone daily. Pt continues to manifest fluctuating blood pressure which is unlikely to be based on adrenal insufficiency at this point. 10 mg of prednisone (equivalent of 50 mg hydrocortisone) should be adequate to cover hemodynamics. Moreover the wide and rapid fluctuations in BP are not characteristic of  glucocorticoid deficiency. Would continue current prednisone dosing for now. I do anticipate weaning this in the next month. 2. Hyperglycemia: patient is eating very poorly. During my visit today she had not eaten any of her breakfast and this has apparently been consistent for most meals. Weight loss noted. Interesting spike in glucose this AM which is out of character with usual glucose profile and suggests either false reading (please repeat before treating) or acute exogenous factor causing elevation. Urine tox screen positive for cocaine which could explain acute glucose elevation. . Given her admission A1c of 6.4% and weight loss during this hospitalization plus modest glucose excursions I believe she can be managed after discharge on little or perhaps no medication. She will certainly not require insulin on discharge. Would avoid sliding scale insulin. 3. TFT abnormalities: Free T4 normal. Anticipate a gradual normalization of TSH. Findings consistent with non-thyroidal illness. I spent more than 30 minutes with patient and team.    Thank you for permitting me to assist in her care. I will continue to follow.

## 2019-06-03 ENCOUNTER — APPOINTMENT (OUTPATIENT)
Dept: NON INVASIVE DIAGNOSTICS | Age: 59
DRG: 215 | End: 2019-06-03
Attending: NURSE PRACTITIONER
Payer: MEDICARE

## 2019-06-03 LAB
ABO + RH BLD: NORMAL
ALBUMIN SERPL-MCNC: 3.3 G/DL (ref 3.5–5)
ALBUMIN/GLOB SERPL: 1 {RATIO} (ref 1.1–2.2)
ALP SERPL-CCNC: 129 U/L (ref 45–117)
ALT SERPL-CCNC: 51 U/L (ref 12–78)
ANION GAP SERPL CALC-SCNC: 6 MMOL/L (ref 5–15)
APTT PPP: 22.6 SEC (ref 22.1–32)
AST SERPL-CCNC: 46 U/L (ref 15–37)
BACTERIA SPEC CULT: NORMAL
BILIRUB SERPL-MCNC: 0.5 MG/DL (ref 0.2–1)
BLOOD GROUP ANTIBODIES SERPL: NORMAL
BNP SERPL-MCNC: 3163 PG/ML
BUN SERPL-MCNC: 31 MG/DL (ref 6–20)
BUN/CREAT SERPL: 28 (ref 12–20)
CALCIUM SERPL-MCNC: 9.1 MG/DL (ref 8.5–10.1)
CC UR VC: NORMAL
CHLORIDE SERPL-SCNC: 98 MMOL/L (ref 97–108)
CO2 SERPL-SCNC: 31 MMOL/L (ref 21–32)
COMMENT, HOLDF: NORMAL
CREAT SERPL-MCNC: 1.12 MG/DL (ref 0.55–1.02)
DIGOXIN SERPL-MCNC: 0.6 NG/ML (ref 0.9–2)
ECHO AO ROOT DIAM: 2.5 CM
ECHO LA MAJOR AXIS: 2.59 CM
ECHO LA TO AORTIC ROOT RATIO: 1.04
ECHO LV E' LATERAL VELOCITY: 4.67 CM/S
ECHO LV E' SEPTAL VELOCITY: 4.67 CM/S
ECHO LV INTERNAL DIMENSION DIASTOLIC: 2.81 CM (ref 3.9–5.3)
ECHO LV INTERNAL DIMENSION SYSTOLIC: 1.8 CM
ECHO LV IVSD: 1.33 CM (ref 0.6–0.9)
ECHO LV MASS 2D: 130.9 G (ref 67–162)
ECHO LV MASS INDEX 2D: 88.3 G/M2 (ref 43–95)
ECHO LV POSTERIOR WALL DIASTOLIC: 1.3 CM (ref 0.6–0.9)
ECHO MV A VELOCITY: 96.09 CM/S
ECHO MV AREA PHT: 3.5 CM2
ECHO MV E DECELERATION TIME (DT): 216.3 MS
ECHO MV E VELOCITY: 71.47 CM/S
ECHO MV E/A RATIO: 0.74
ECHO MV E/E' LATERAL: 15.3
ECHO MV E/E' RATIO (AVERAGED): 15.3
ECHO MV E/E' SEPTAL: 15.3
ECHO MV PRESSURE HALF TIME (PHT): 62.7 MS
ECHO PV MAX VELOCITY: 105.12 CM/S
ECHO PV PEAK GRADIENT: 4.4 MMHG
ECHO RV INTERNAL DIMENSION: 2.45 CM
ECHO TV REGURGITANT MAX VELOCITY: 287.99 CM/S
ECHO TV REGURGITANT PEAK GRADIENT: 33.2 MMHG
GLOBULIN SER CALC-MCNC: 3.4 G/DL (ref 2–4)
GLUCOSE BLD STRIP.AUTO-MCNC: 105 MG/DL (ref 65–100)
GLUCOSE BLD STRIP.AUTO-MCNC: 108 MG/DL (ref 65–100)
GLUCOSE BLD STRIP.AUTO-MCNC: 132 MG/DL (ref 65–100)
GLUCOSE BLD STRIP.AUTO-MCNC: 252 MG/DL (ref 65–100)
GLUCOSE SERPL-MCNC: 110 MG/DL (ref 65–100)
INR PPP: 1.1 (ref 0.9–1.1)
LACTATE SERPL-SCNC: 1.2 MMOL/L (ref 0.4–2)
LDH SERPL L TO P-CCNC: 338 U/L (ref 81–246)
POTASSIUM SERPL-SCNC: 3.8 MMOL/L (ref 3.5–5.1)
PROT SERPL-MCNC: 6.7 G/DL (ref 6.4–8.2)
PROTHROMBIN TIME: 10.9 SEC (ref 9–11.1)
SAMPLES BEING HELD,HOLD: NORMAL
SERVICE CMNT-IMP: ABNORMAL
SERVICE CMNT-IMP: NORMAL
SODIUM SERPL-SCNC: 135 MMOL/L (ref 136–145)
SPECIMEN EXP DATE BLD: NORMAL
T4 FREE SERPL-MCNC: 1.3 NG/DL (ref 0.8–1.5)
THERAPEUTIC RANGE,PTTT: NORMAL SECS (ref 58–77)

## 2019-06-03 PROCEDURE — 74011250637 HC RX REV CODE- 250/637: Performed by: INTERNAL MEDICINE

## 2019-06-03 PROCEDURE — 85730 THROMBOPLASTIN TIME PARTIAL: CPT

## 2019-06-03 PROCEDURE — 36415 COLL VENOUS BLD VENIPUNCTURE: CPT

## 2019-06-03 PROCEDURE — 85610 PROTHROMBIN TIME: CPT

## 2019-06-03 PROCEDURE — 74011250637 HC RX REV CODE- 250/637: Performed by: NURSE PRACTITIONER

## 2019-06-03 PROCEDURE — 83605 ASSAY OF LACTIC ACID: CPT

## 2019-06-03 PROCEDURE — 65660000001 HC RM ICU INTERMED STEPDOWN

## 2019-06-03 PROCEDURE — 86900 BLOOD TYPING SEROLOGIC ABO: CPT

## 2019-06-03 PROCEDURE — 94640 AIRWAY INHALATION TREATMENT: CPT

## 2019-06-03 PROCEDURE — 82962 GLUCOSE BLOOD TEST: CPT

## 2019-06-03 PROCEDURE — 80053 COMPREHEN METABOLIC PANEL: CPT

## 2019-06-03 PROCEDURE — 93306 TTE W/DOPPLER COMPLETE: CPT

## 2019-06-03 PROCEDURE — 94760 N-INVAS EAR/PLS OXIMETRY 1: CPT

## 2019-06-03 PROCEDURE — 83615 LACTATE (LD) (LDH) ENZYME: CPT

## 2019-06-03 PROCEDURE — 74011636637 HC RX REV CODE- 636/637: Performed by: INTERNAL MEDICINE

## 2019-06-03 PROCEDURE — 84439 ASSAY OF FREE THYROXINE: CPT

## 2019-06-03 PROCEDURE — 74011000250 HC RX REV CODE- 250: Performed by: HOSPITALIST

## 2019-06-03 PROCEDURE — 83880 ASSAY OF NATRIURETIC PEPTIDE: CPT

## 2019-06-03 PROCEDURE — 80162 ASSAY OF DIGOXIN TOTAL: CPT

## 2019-06-03 PROCEDURE — 74011636637 HC RX REV CODE- 636/637: Performed by: NURSE PRACTITIONER

## 2019-06-03 RX ORDER — INSULIN LISPRO 100 [IU]/ML
INJECTION, SOLUTION INTRAVENOUS; SUBCUTANEOUS
Status: DISCONTINUED | OUTPATIENT
Start: 2019-06-03 | End: 2019-06-04 | Stop reason: HOSPADM

## 2019-06-03 RX ORDER — MAGNESIUM SULFATE 100 %
4 CRYSTALS MISCELLANEOUS AS NEEDED
Status: DISCONTINUED | OUTPATIENT
Start: 2019-06-03 | End: 2019-06-04 | Stop reason: HOSPADM

## 2019-06-03 RX ORDER — DEXTROSE 50 % IN WATER (D50W) INTRAVENOUS SYRINGE
12.5-25 AS NEEDED
Status: DISCONTINUED | OUTPATIENT
Start: 2019-06-03 | End: 2019-06-04 | Stop reason: HOSPADM

## 2019-06-03 RX ORDER — METFORMIN HYDROCHLORIDE 500 MG/1
500 TABLET ORAL 2 TIMES DAILY WITH MEALS
Status: DISCONTINUED | OUTPATIENT
Start: 2019-06-03 | End: 2019-06-04 | Stop reason: HOSPADM

## 2019-06-03 RX ORDER — DIGOXIN 125 MCG
0.12 TABLET ORAL DAILY
Status: DISCONTINUED | OUTPATIENT
Start: 2019-06-03 | End: 2019-06-04 | Stop reason: HOSPADM

## 2019-06-03 RX ADMIN — CHLORHEXIDINE GLUCONATE 15 ML: 1.2 RINSE ORAL at 17:31

## 2019-06-03 RX ADMIN — Medication 400 MG: at 08:17

## 2019-06-03 RX ADMIN — Medication 10 ML: at 06:47

## 2019-06-03 RX ADMIN — CHLORHEXIDINE GLUCONATE 15 ML: 1.2 RINSE ORAL at 08:18

## 2019-06-03 RX ADMIN — METFORMIN HYDROCHLORIDE 500 MG: 500 TABLET ORAL at 17:31

## 2019-06-03 RX ADMIN — CAPTOPRIL 6.25 MG: 12.5 TABLET ORAL at 07:28

## 2019-06-03 RX ADMIN — BUDESONIDE 250 MCG: 0.25 INHALANT RESPIRATORY (INHALATION) at 09:46

## 2019-06-03 RX ADMIN — ASPIRIN 81 MG: 81 TABLET ORAL at 08:17

## 2019-06-03 RX ADMIN — PREDNISONE 10 MG: 10 TABLET ORAL at 08:17

## 2019-06-03 RX ADMIN — CLONAZEPAM 0.5 MG: 0.5 TABLET ORAL at 08:17

## 2019-06-03 RX ADMIN — DIGOXIN 0.12 MG: 125 TABLET ORAL at 15:04

## 2019-06-03 RX ADMIN — INSULIN GLARGINE 8 UNITS: 100 INJECTION, SOLUTION SUBCUTANEOUS at 08:18

## 2019-06-03 RX ADMIN — PANTOPRAZOLE SODIUM 40 MG: 40 TABLET, DELAYED RELEASE ORAL at 07:28

## 2019-06-03 RX ADMIN — POLYETHYLENE GLYCOL 3350 17 G: 17 POWDER, FOR SOLUTION ORAL at 08:17

## 2019-06-03 RX ADMIN — CAPTOPRIL 6.25 MG: 12.5 TABLET ORAL at 17:32

## 2019-06-03 RX ADMIN — INSULIN LISPRO 7 UNITS: 100 INJECTION, SOLUTION INTRAVENOUS; SUBCUTANEOUS at 11:29

## 2019-06-03 RX ADMIN — CLONAZEPAM 0.5 MG: 0.5 TABLET ORAL at 17:31

## 2019-06-03 RX ADMIN — Medication 10 ML: at 13:49

## 2019-06-03 RX ADMIN — SITAGLIPTIN 50 MG: 25 TABLET, FILM COATED ORAL at 13:49

## 2019-06-03 RX ADMIN — BUDESONIDE 250 MCG: 0.25 INHALANT RESPIRATORY (INHALATION) at 20:40

## 2019-06-03 RX ADMIN — PRAVASTATIN SODIUM 40 MG: 40 TABLET ORAL at 21:34

## 2019-06-03 RX ADMIN — SENNOSIDES,DOCUSATE SODIUM 1 TABLET: 8.6; 5 TABLET, FILM COATED ORAL at 08:17

## 2019-06-03 RX ADMIN — Medication 10 ML: at 21:36
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
## 2019-06-03 NOTE — PROGRESS NOTES
Progress Note    Patient: Valentin Black MRN: [id number]  SSN: [id number]    YOB: 1960  Age: 66 y.o. Sex: female      Admit Date: 5/18/2019    LOS: 16 days     Subjective:     65 yo WF with non-STEMI, mild CAD, stress cardiomyopathy in the pattern of Takotsubo. Developed declining hemodynamic function 5/21/19 and required increased vasopressor support and emergent impella by CT surgery. Pt now off inotropes. Maximizing heart failure treatment  Had impella removed  5/28/19. Echocardiogram with gradual improvement. Resting comfortably this morning. No chest pain or shortness of breath. Labile hemodynamics over weekend leading to repeat urine toxicology screen-> positive for cocaine. Medications adjusted. MAP currently 55. Objective:     Vitals:    06/03/19 0819 06/03/19 0845 06/03/19 0900 06/03/19 0946   BP: (!) 84/43 (!) 80/45 (!) 143/122    Pulse: 92 92 99    Resp:       Temp:       SpO2:    97%   Weight:       Height:            Intake and Output:  Current Shift: 06/03 0701 - 06/03 1900  In: 240 [P.O.:240]  Out: -   Last three shifts: 06/01 1901 - 06/03 0700  In: 340 [P.O.:340]  Out: 750 [Urine:750]      Temp: 98.1 °F (36.7 °C) (06/03/19 0758) Pulse (Heart Rate): 99 (06/03/19 0900) Resp Rate: 16 (06/03/19 0758) BP: (!) 143/122 (06/03/19 0900) O2 Sat (%): 97 % (06/03/19 0946) Weight: 49.9 kg (110 lb 0.2 oz) (06/03/19 0007)       Physical Exam:   General:  Alert, cooperative, no distress, appears older than stated age. Sitting up in chair smiling. Eyes:  Conjunctivae/corneas clear. Pupils equal   Ears:  Hearing normal   Nose: Nares normal. Septum midline. Mucosa normal. No drainage   Mouth/Throat: Lips, mucosa, normal.   Neck: Supple, symmetrical, trachea midline,  Rt IJ swan in place. no JVD. Back:   Symmetric, no curvature. Lungs:   Diminished to auscultation bilateral bases. no wheeze, rales. Heart:  Regular rate and rhythm, S1, S2 normal, no murmur, click, rub or gallop. Abdomen:   Soft, non-distended, nontender. Bowel sounds normal. No masses,  No organomegaly. Extremities: Extremities normal, atraumatic, no cyanosis or edema. Pulses: 1+ and symmetric all extremities. Skin: Skin color, texture, turgor normal. No rashes or lesions       Neurologic: CNII-XII grossly intact. CHRISTOPHER,        Lab/Data Review:      Assessment:     Active Problems:    NSTEMI (non-ST elevated myocardial infarction) ([facility] [address] (5/18/2019)      Chest pain (5/18/2019)      Overview: Added automatically from request for surgery [id number]      CHF (congestive heart failure), NYHA class IV, acute on chronic, systolic ([facility] [address] (3/23/7831)      Overview: Added automatically from request for surgery [id number]      Abnormal TSH/Free T3  Nonobstructive, mild CAD:   Left Anterior Descending   Ost LAD to Prox LAD lesion 20% stenosed. .   Prox LAD lesion 30% stenosed. .   Mid LAD lesion 60% stenosed. .   Left Circumflex   Prox Cx to Mid Cx lesion 30% stenosed. .   Right Coronary Artery   Prox RCA to Mid RCA lesion 30% stenosed. Agnes Morgan NSVT    Plan:     1. Stress cardiomyopathy/Acute HFrEF:  EF 20-25%   - most recent echo 5/28/19 shows improvement of LVEF to 50% with no RWMA. 2.  CAD, mild: (see above)   - ASA/ statin. - Type 2 MI due to supply-demand mismatch, subendocardial ischemia. 3. Endocrine derangement:   - Endocrine consultation input appreciated. - Hypothyroid, DM (A1C=6.4)              - Medical management of DM    4. Tobacco/Substance abuse:    - Discussed/encouraged smoking cessation.              - Patient denies using drugs in hospital.    5. NSVT:   - No significant ectopy on telemetry. Await disposition per CHF team. Will be available if needed.         Signed By: Jorge Kiser MD     Ijeoma 3, 2019

## 2019-06-03 NOTE — CONSULTS
Events noted. In  conversation yesterday with the medical and nursing team caring for Ms. Inge Stephens, the idea of Januvia as an alternative was entertained. Our concern is that she would not be able to afford this medication which precludes its long-term use. As noted previously, she may require no medication at all. Given her recovery in ejection fraction, low-dose metformin may also be a possibility. I am concerned about even low-dose sulfonylureas causing hypoglycemia in this woman who has an unpredictable intake of food.

## 2019-06-03 NOTE — PROGRESS NOTES
Bedside shift change report given to PEDRO LUIS Call (oncoming nurse) by RAJIV Cedillo (offgoing nurse). Report included the following information SBAR, Procedure Summary, Intake/Output, MAR and Recent Results.
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
## 2019-06-03 NOTE — PROGRESS NOTES
[facility] VAD/Heart Failure Progress Note    Admit Date: 2019  POD:  6 Days Post-Op    Procedure:  Procedure(s):  IMPELLA REMOVAL        Subjective:   Mild dyspnea, fatigue, and weakness; denies chest pain;      Objective:   Vitals:  Blood pressure (!) 143/122, pulse 99, temperature 98.1 °F (36.7 °C), resp. rate 16, height 5' 4\" (1.626 m), weight 110 lb 0.2 oz (49.9 kg), last menstrual period 2011, SpO2 97 %. Temp (24hrs), Av.1 °F (36.7 °C), Min:97.8 °F (36.6 °C), Max:98.2 °F (36.8 °C)    Hemodynamics:   CO: CO (l/min): 3.2 l/min   CI: CI (l/min/m2): 2 l/min/m2   CVP: CVP (mmHg): 9 mmHg (19 1600)   SVR: SVR (dyne*sec)/cm5: 1382 (dyne*sec)/cm5 (19 5515)   PAP Systolic: PAP Systolic: 12 ( 7297)   PAP Diastolic: PAP Diastolic: 7 ( 4328)   PVR:     SV02: SVO2 (%): (Unable to calibrate/drawback on PA catheter. MD aware.) (19)   SCV02:      VAD Interrogation:      EKG/Rhythm:      Extubation Date / Time:     CT Output:     Ventilator:  Ventilator Volumes  Vt Set (ml): 440 ml (19)  Vt Exhaled (Machine Breath) (ml): 349 ml (19)  Ve Observed (l/min): 8.55 l/min (19)    Oxygen Therapy:  Oxygen Therapy  O2 Sat (%): 97 % (19)  Pulse via Oximetry: 95 beats per minute (19)  O2 Device: Room air (19)  O2 Flow Rate (L/min): 2 l/min (19 2100)  O2 Temperature: 39.2 °F (4 °C) (19 0600)  FIO2 (%): 40 % (19 191)    CXR:    Admission Weight: Last Weight   Weight: 125 lb (56.7 kg) Weight: 110 lb 0.2 oz (49.9 kg)     Intake / Output / Drain:  Current Shift:  07 -  190  In: 240 [P.O.:240]  Out: -   Last 24 hrs.:     Intake/Output Summary (Last 24 hours) at 6/3/2019 1114  Last data filed at 6/3/2019 0859  Gross per 24 hour   Intake 340 ml   Output 750 ml   Net -410 ml         No results for input(s): CPK, CKMB, TROIQ in the last 72 hours.   Recent Labs     19  3099 06/02/19  0436 06/01/19  0314   * 135* 135*   K 3.8 3.8 4.6   CO2 31 31 29   BUN 31* 31* 33*   CREA 1.12* 1.16* 1.16*   * 97 113*     Recent Labs     06/03/19  0306 06/02/19  0436 06/01/19  0314   INR 1.1 1.1 1.1   PTP 10.9 11.0 10.8   APTT 22.6 23.0 22.6     No lab exists for component: PBNP        Current Facility-Administered Medications:     captopril (CAPOTEN) tablet 6.25 mg, 6.25 mg, Oral, TIDAC, Amena De La Cruz MD, 6.25 mg at 06/03/19 0637    predniSONE (DELTASONE) tablet 10 mg, 10 mg, Oral, DAILY WITH BREAKFAST, Polliard, Gregoria Lunch T, NP, 10 mg at 06/03/19 0817    clonazePAM (KlonoPIN) tablet 0.5 mg, 0.5 mg, Oral, BID, Byron Georges MD, 0.5 mg at 06/03/19 0817    insulin glargine (LANTUS) injection 8 Units, 8 Units, SubCUTAneous, DAILY, Kaelyn Sands MD, 8 Units at 06/03/19 0818    insulin lispro (HUMALOG) injection, , SubCUTAneous, AC&HS, Kaelyn Sands MD, Stopped at 06/02/19 1630    chlorhexidine (PERIDEX) 0.12 % mouthwash 15 mL, 15 mL, Oral, BID, [facility] Center[facility] MD, 15 mL at 06/03/19 0818    magnesium oxide (MAG-OX) tablet 400 mg, 400 mg, Oral, DAILY, Kaelyn Sands MD, 400 mg at 06/03/19 0817    pravastatin (PRAVACHOL) tablet 40 mg, 40 mg, Oral, QHS, Beena Ibanez, NP, 40 mg at 06/02/19 2221    polyethylene glycol (MIRALAX) packet 17 g, 17 g, Oral, DAILY, Amena De La Cruz MD, 17 g at 06/03/19 0817    senna-docusate (PERICOLACE) 8.6-50 mg per tablet 1 Tab, 1 Tab, Oral, DAILY, Amena De La Cruz MD, 1 Tab at 06/03/19 0918    aspirin delayed-release tablet 81 mg, 81 mg, Oral, DAILY, Polliard, Gregoria Lunch T, NP, 81 mg at 06/03/19 0817    pantoprazole (PROTONIX) tablet 40 mg, 40 mg, Oral, ACB, Polliard, Gregoria Lunch T, NP, 40 mg at 06/03/19 0728    0.9% sodium chloride infusion, 9 mL/hr, IntraVENous, CONTINUOUS, [facility] Hospital[facility] nicole MD, Last Rate: 12 mL/hr at 05/28/19 0217, 12 mL/hr at 05/28/19 0217    glucose chewable tablet 16 g, 4 Tab, Oral, PRN, Selina Schaeffer NP    dextrose (D50W) injection syrg 12.5-25 g, 25-50 mL, IntraVENous, PRN, Cielo Virk NP    glucagon (GLUCAGEN) injection 1 mg, 1 mg, IntraMUSCular, PRN, Cielo Virk NP    sodium chloride (NS) flush 5-40 mL, 5-40 mL, IntraVENous, Q8H, Chinyere Gan MD, 10 mL at 06/03/19 1813    sodium chloride (NS) flush 5-40 mL, 5-40 mL, IntraVENous, PRN, Chinyere Gan MD, 10 mL at 05/29/19 1913    nitroglycerin (NITROSTAT) tablet 0.4 mg, 0.4 mg, SubLINGual, Q5MIN PRN, Chinyere Gan MD    oxyCODONE-acetaminophen (PERCOCET) 5-325 mg per tablet 1 Tab, 1 Tab, Oral, Q4H PRN, Mercedez Gan MD, 1 Tab at 05/30/19 1546    budesonide (PULMICORT) 250 mcg/2ml nebulizer susp, 250 mcg, Nebulization, BID RT, Chinyere Gan MD, 250 mcg at 06/03/19 0946    acetaminophen (TYLENOL) tablet 650 mg, 650 mg, Oral, Q4H PRN, Rainer Morejon MD, 650 mg at 05/23/19 2049    A/P      1. Acute systolic heart failure (NYHA class IV on admission):  dig, aldactone. 2. Atelectasis: Encourage I/S,   3. Endocrinological derangement: On hydrocortisone  AHF/endocrine. Endocrine following. 4. Hx arthritis, fibromyalgia, chronic pain, DJD  5. GERD: protonix  6. Hx stroke: pt reports stroke in the past, no deficits  7. COPD: Current smoker, smoking cessation education. 8. Anemia: rmonitor. 9. Hyperglycemia:Lantus, SSI. Cont  10.  Leukocytosis: on steroids,     Risk of morbidity and mortality - high  Medical decision making - high complexity     Signed By: Srinivas Edwards MD

## 2019-06-03 NOTE — PROGRESS NOTES
06/03/19 0758   Vital Signs   Temp 98.1 °F (36.7 °C)   Temp Source Oral   Pulse (Heart Rate) 84   Heart Rate Source Monitor   Cardiac Rhythm NSR   Resp Rate 16   O2 Sat (%) 97 %   Level of Consciousness Alert   BP (!) 77/44   MAP (Calculated) (!) 55   BP 1 Method Automatic   BP 1 Location Left arm   BP Patient Position At rest   MEWS Score 3   Pain 1   Pain Scale 1 Numeric (0 - 10)   Pain Intensity 1 0   Patient Stated Pain Goal 0   Oxygen Therapy   O2 Device Room air   mews of 3, BP labile. L Bounds RN at bedside reports pt asymptomatic. See doc flowsheet for following BP. Team made aware of BP. No new orders at this time. 0900: called and made medical services aware of need for echo.  1500: called again to medical services the  would communicate message to techs. [phone]: received call from tech stating that a tech was on the 4th floor and would be completing the echo. 1740: echo tech on call paged and made aware no tech as come and the study needs to be completed today. Anjali will reach out to on call tech. 1930: Bedside and Verbal shift change report given to Pawan Blair rn (oncoming nurse) by shaquille puente rn (offgoing nurse). Report included the following information SBAR, Kardex, ED Summary, OR Summary, Procedure Summary, Intake/Output, MAR and Recent Results.

## 2019-06-03 NOTE — DIABETES MGMT
[facility]    [facility] Progress Note    Recommendations/ Comments: Chart reviewed for fluctuating blood sugars. Patient hypoglycemic yesterday afternoon (51 mg/dL) after receiving 10 units of correction    If appropriate, please consider   -change lispro correction scale to normal sensitivity   - addition of Januvia 50 mg with breakfast (can include now)  Spoke with NP regarding above. Current hospital DM medication:   Lantus 8 units daily  Lispro resistant correction scale    Chart reviewed on [facility]. Patient is a 66 y.o. female with a history of gestational diabetes, newly diagnosed with pre-diabetes. DTC provided education 5/28/2019. A1c:   Lab Results   Component Value Date/Time    Hemoglobin A1c 6.4 (H) 05/19/2019 08:11 AM       Recent Glucose Results:   Lab Results   Component Value Date/Time     (H) 06/03/2019 03:06 AM    GLUCPOC 252 (H) 06/03/2019 11:17 AM    GLUCPOC 132 (H) 06/03/2019 06:35 AM    GLUCPOC 121 (H) 06/02/2019 09:36 PM        Lab Results   Component Value Date/Time    Creatinine 1.12 (H) 06/03/2019 03:06 AM     Estimated Creatinine Clearance: 44.2 mL/min (A) (based on SCr of 1.12 mg/dL (H)). Active Orders   Diet    DIET CARDIAC Regular; No Conc. Sweets        PO intake:   Patient Vitals for the past 72 hrs:   % Diet Eaten   06/03/19 0859 50 %   06/02/19 0732 100 %   06/01/19 0742 100 %       Will continue to follow as needed.     Thank you  Alma Stephen MS, RN, CDE    Time spent: 6 min

## 2019-06-04 VITALS
TEMPERATURE: 97.2 F | BODY MASS INDEX: 20.2 KG/M2 | OXYGEN SATURATION: 94 % | WEIGHT: 109.79 LBS | HEART RATE: 96 BPM | HEIGHT: 62 IN | RESPIRATION RATE: 14 BRPM | DIASTOLIC BLOOD PRESSURE: 68 MMHG | SYSTOLIC BLOOD PRESSURE: 112 MMHG

## 2019-06-04 LAB
ALBUMIN SERPL-MCNC: 3.4 G/DL (ref 3.5–5)
ALBUMIN/GLOB SERPL: 1 {RATIO} (ref 1.1–2.2)
ALP SERPL-CCNC: 125 U/L (ref 45–117)
ALT SERPL-CCNC: 42 U/L (ref 12–78)
AMPHET UR QL SCN: NEGATIVE
ANION GAP SERPL CALC-SCNC: 8 MMOL/L (ref 5–15)
APTT PPP: 23.8 SEC (ref 22.1–32)
AST SERPL-CCNC: 36 U/L (ref 15–37)
ATRIAL RATE: 87 BPM
BARBITURATES UR QL SCN: NEGATIVE
BENZODIAZ UR QL: NEGATIVE
BILIRUB SERPL-MCNC: 0.3 MG/DL (ref 0.2–1)
BNP SERPL-MCNC: 3342 PG/ML
BUN SERPL-MCNC: 33 MG/DL (ref 6–20)
BUN/CREAT SERPL: 27 (ref 12–20)
CALCIUM SERPL-MCNC: 9.5 MG/DL (ref 8.5–10.1)
CALCULATED P AXIS, ECG09: 84 DEGREES
CALCULATED R AXIS, ECG10: -27 DEGREES
CALCULATED T AXIS, ECG11: 99 DEGREES
CANNABINOIDS UR QL SCN: NEGATIVE
CHLORIDE SERPL-SCNC: 99 MMOL/L (ref 97–108)
CK MB CFR SERPL CALC: 10 % (ref 0–2.5)
CK MB SERPL-MCNC: 8.2 NG/ML (ref 5–25)
CK SERPL-CCNC: 82 U/L (ref 26–192)
CO2 SERPL-SCNC: 29 MMOL/L (ref 21–32)
COCAINE UR QL SCN: NEGATIVE
CREAT SERPL-MCNC: 1.22 MG/DL (ref 0.55–1.02)
DIAGNOSIS, 93000: NORMAL
DIGOXIN SERPL-MCNC: 0.7 NG/ML (ref 0.9–2)
DRUG SCRN COMMENT,DRGCM: NORMAL
ERYTHROCYTE [DISTWIDTH] IN BLOOD BY AUTOMATED COUNT: 15.1 % (ref 11.5–14.5)
GLOBULIN SER CALC-MCNC: 3.3 G/DL (ref 2–4)
GLUCOSE BLD STRIP.AUTO-MCNC: 162 MG/DL (ref 65–100)
GLUCOSE BLD STRIP.AUTO-MCNC: 99 MG/DL (ref 65–100)
GLUCOSE SERPL-MCNC: 97 MG/DL (ref 65–100)
HCT VFR BLD AUTO: 33.1 % (ref 35–47)
HGB BLD-MCNC: 10.6 G/DL (ref 11.5–16)
INR PPP: 1.1 (ref 0.9–1.1)
LACTATE SERPL-SCNC: 1 MMOL/L (ref 0.4–2)
LDH SERPL L TO P-CCNC: 319 U/L (ref 81–246)
MCH RBC QN AUTO: 28.9 PG (ref 26–34)
MCHC RBC AUTO-ENTMCNC: 32 G/DL (ref 30–36.5)
MCV RBC AUTO: 90.2 FL (ref 80–99)
METHADONE UR QL: NEGATIVE
NRBC # BLD: 0 K/UL (ref 0–0.01)
NRBC BLD-RTO: 0 PER 100 WBC
OPIATES UR QL: NEGATIVE
P-R INTERVAL, ECG05: 112 MS
PCP UR QL: NEGATIVE
PLATELET # BLD AUTO: 534 K/UL (ref 150–400)
PMV BLD AUTO: 9.1 FL (ref 8.9–12.9)
POTASSIUM SERPL-SCNC: 3.8 MMOL/L (ref 3.5–5.1)
PROCALCITONIN SERPL-MCNC: <0.1 NG/ML
PROT SERPL-MCNC: 6.7 G/DL (ref 6.4–8.2)
PROTHROMBIN TIME: 10.8 SEC (ref 9–11.1)
Q-T INTERVAL, ECG07: 366 MS
QRS DURATION, ECG06: 76 MS
QTC CALCULATION (BEZET), ECG08: 440 MS
RBC # BLD AUTO: 3.67 M/UL (ref 3.8–5.2)
SERVICE CMNT-IMP: ABNORMAL
SERVICE CMNT-IMP: NORMAL
SODIUM SERPL-SCNC: 136 MMOL/L (ref 136–145)
T4 FREE SERPL-MCNC: 1.2 NG/DL (ref 0.8–1.5)
THERAPEUTIC RANGE,PTTT: NORMAL SECS (ref 58–77)
TROPONIN I SERPL-MCNC: 0.23 NG/ML
VENTRICULAR RATE, ECG03: 87 BPM
WBC # BLD AUTO: 12.8 K/UL (ref 3.6–11)

## 2019-06-04 PROCEDURE — 82550 ASSAY OF CK (CPK): CPT

## 2019-06-04 PROCEDURE — 82962 GLUCOSE BLOOD TEST: CPT

## 2019-06-04 PROCEDURE — 74011000250 HC RX REV CODE- 250: Performed by: HOSPITALIST

## 2019-06-04 PROCEDURE — 74011250637 HC RX REV CODE- 250/637: Performed by: NURSE PRACTITIONER

## 2019-06-04 PROCEDURE — 83880 ASSAY OF NATRIURETIC PEPTIDE: CPT

## 2019-06-04 PROCEDURE — 84484 ASSAY OF TROPONIN QUANT: CPT

## 2019-06-04 PROCEDURE — 94640 AIRWAY INHALATION TREATMENT: CPT

## 2019-06-04 PROCEDURE — 99239 HOSP IP/OBS DSCHRG MGMT >30: CPT | Performed by: INTERNAL MEDICINE

## 2019-06-04 PROCEDURE — 84439 ASSAY OF FREE THYROXINE: CPT

## 2019-06-04 PROCEDURE — 80307 DRUG TEST PRSMV CHEM ANLYZR: CPT

## 2019-06-04 PROCEDURE — 93005 ELECTROCARDIOGRAM TRACING: CPT

## 2019-06-04 PROCEDURE — 74011636637 HC RX REV CODE- 636/637: Performed by: NURSE PRACTITIONER

## 2019-06-04 PROCEDURE — 74011250637 HC RX REV CODE- 250/637: Performed by: INTERNAL MEDICINE

## 2019-06-04 PROCEDURE — 80053 COMPREHEN METABOLIC PANEL: CPT

## 2019-06-04 PROCEDURE — 74011636637 HC RX REV CODE- 636/637: Performed by: INTERNAL MEDICINE

## 2019-06-04 PROCEDURE — 85610 PROTHROMBIN TIME: CPT

## 2019-06-04 PROCEDURE — 85027 COMPLETE CBC AUTOMATED: CPT

## 2019-06-04 PROCEDURE — 84145 PROCALCITONIN (PCT): CPT

## 2019-06-04 PROCEDURE — 83615 LACTATE (LD) (LDH) ENZYME: CPT

## 2019-06-04 PROCEDURE — 83605 ASSAY OF LACTIC ACID: CPT

## 2019-06-04 PROCEDURE — 80162 ASSAY OF DIGOXIN TOTAL: CPT

## 2019-06-04 PROCEDURE — 36415 COLL VENOUS BLD VENIPUNCTURE: CPT

## 2019-06-04 PROCEDURE — 74011250636 HC RX REV CODE- 250/636: Performed by: NURSE PRACTITIONER

## 2019-06-04 PROCEDURE — 86618 LYME DISEASE ANTIBODY: CPT

## 2019-06-04 PROCEDURE — 85730 THROMBOPLASTIN TIME PARTIAL: CPT

## 2019-06-04 RX ORDER — PREDNISONE 10 MG/1
10 TABLET ORAL
Qty: 30 TAB | Refills: 1 | Status: SHIPPED | OUTPATIENT
Start: 2019-06-05 | End: 2019-07-05 | Stop reason: ALTCHOICE

## 2019-06-04 RX ORDER — ALBUTEROL SULFATE 90 UG/1
2 AEROSOL, METERED RESPIRATORY (INHALATION)
Qty: 1 INHALER | Refills: 1 | Status: SHIPPED | OUTPATIENT
Start: 2019-06-04 | End: 2019-06-04 | Stop reason: SDUPTHER

## 2019-06-04 RX ORDER — METFORMIN HYDROCHLORIDE 500 MG/1
500 TABLET ORAL 2 TIMES DAILY WITH MEALS
Qty: 60 TAB | Refills: 1 | Status: SHIPPED | OUTPATIENT
Start: 2019-06-04 | End: 2019-08-14 | Stop reason: SDUPTHER

## 2019-06-04 RX ORDER — LANOLIN ALCOHOL/MO/W.PET/CERES
400 CREAM (GRAM) TOPICAL DAILY
Qty: 60 TAB | Refills: 1 | Status: SHIPPED | OUTPATIENT
Start: 2019-06-05

## 2019-06-04 RX ORDER — POLYETHYLENE GLYCOL 3350 17 G/17G
17 POWDER, FOR SOLUTION ORAL DAILY
Qty: 30 PACKET | Refills: 11 | Status: SHIPPED | OUTPATIENT
Start: 2019-06-05 | End: 2019-06-14 | Stop reason: ALTCHOICE

## 2019-06-04 RX ORDER — POTASSIUM CHLORIDE 750 MG/1
40 TABLET, FILM COATED, EXTENDED RELEASE ORAL
Status: COMPLETED | OUTPATIENT
Start: 2019-06-04 | End: 2019-06-04

## 2019-06-04 RX ORDER — MAGNESIUM SULFATE 100 %
4 CRYSTALS MISCELLANEOUS AS NEEDED
Qty: 8 TAB | Refills: 0 | Status: SHIPPED | OUTPATIENT
Start: 2019-06-04

## 2019-06-04 RX ORDER — ALBUTEROL SULFATE 90 UG/1
AEROSOL, METERED RESPIRATORY (INHALATION)
Qty: 1 INHALER | Refills: 1 | Status: SHIPPED | OUTPATIENT
Start: 2019-06-04

## 2019-06-04 RX ORDER — PRAVASTATIN SODIUM 40 MG/1
40 TABLET ORAL
Qty: 30 TAB | Refills: 1 | Status: SHIPPED | OUTPATIENT
Start: 2019-06-04 | End: 2019-06-05 | Stop reason: SDUPTHER

## 2019-06-04 RX ORDER — ASPIRIN 81 MG/1
81 TABLET ORAL DAILY
Qty: 30 TAB | Refills: 3 | Status: SHIPPED | OUTPATIENT
Start: 2019-06-05

## 2019-06-04 RX ORDER — PANTOPRAZOLE SODIUM 40 MG/1
40 TABLET, DELAYED RELEASE ORAL
Qty: 30 TAB | Refills: 1 | Status: SHIPPED | OUTPATIENT
Start: 2019-06-05 | End: 2019-06-05 | Stop reason: SDUPTHER

## 2019-06-04 RX ADMIN — POTASSIUM CHLORIDE 40 MEQ: 750 TABLET, FILM COATED, EXTENDED RELEASE ORAL at 09:59

## 2019-06-04 RX ADMIN — PREDNISONE 10 MG: 10 TABLET ORAL at 09:59

## 2019-06-04 RX ADMIN — CAPTOPRIL 6.25 MG: 12.5 TABLET ORAL at 06:34

## 2019-06-04 RX ADMIN — Medication 400 MG: at 09:59

## 2019-06-04 RX ADMIN — CLONAZEPAM 0.5 MG: 0.5 TABLET ORAL at 09:59

## 2019-06-04 RX ADMIN — IRON SUCROSE 300 MG: 20 INJECTION, SOLUTION INTRAVENOUS at 10:04

## 2019-06-04 RX ADMIN — ASPIRIN 81 MG: 81 TABLET ORAL at 09:59

## 2019-06-04 RX ADMIN — INSULIN LISPRO 2 UNITS: 100 INJECTION, SOLUTION INTRAVENOUS; SUBCUTANEOUS at 12:57

## 2019-06-04 RX ADMIN — Medication 10 ML: at 10:05

## 2019-06-04 RX ADMIN — CHLORHEXIDINE GLUCONATE 15 ML: 1.2 RINSE ORAL at 10:10

## 2019-06-04 RX ADMIN — INSULIN GLARGINE 8 UNITS: 100 INJECTION, SOLUTION SUBCUTANEOUS at 10:35

## 2019-06-04 RX ADMIN — DIGOXIN 0.12 MG: 125 TABLET ORAL at 09:59

## 2019-06-04 RX ADMIN — BUDESONIDE 250 MCG: 0.25 INHALANT RESPIRATORY (INHALATION) at 08:12

## 2019-06-04 RX ADMIN — METFORMIN HYDROCHLORIDE 500 MG: 500 TABLET ORAL at 09:59

## 2019-06-04 RX ADMIN — PANTOPRAZOLE SODIUM 40 MG: 40 TABLET, DELAYED RELEASE ORAL at 06:35

## 2019-06-04 RX ADMIN — Medication 10 ML: at 06:36

## 2019-06-04 NOTE — PROGRESS NOTES
06/04/19 1530   Six Minute Walk Report (PRE)   Heart Rate 103   O2 Saturation 100   Six Minute Walk Report (POST)   Heart Rate 108   O2 Saturation 100   Distance Walked in Feet (ft) 851 ft.    Distance in Meters 259.39 meters

## 2019-06-04 NOTE — PALLIATIVE CARE DISCHARGE
Goals of Care/Treatment Preferences    The Palliative Medicine team was consulted as part of your/your loved one's care in the hospital. Our team is a supportive service; we strive to relieve suffering and improve quality of life. We reviewed advance care planning information, which includes the following:    Confirm Advance Directive: None    Patient/Health Care Proxy Stated Goals: Rehabilitation    We reviewed / discussed your code status as: Full Code     Full Code means perform CPR in the event of cardiac arrest.      DNR means do NOT perform CPR in the event of cardiac arrest.      Partial Code means you have specific preferences, please discuss with your healthcare team.      No Order means this issue was not addressed / resolved during your stay    Medical Interventions: Full interventions     Our team met with you to discuss the importance of completing Advance Directives. We discussed that without the completion of directives, your two children would share in decision-making if you lost the ability to direct your own care. You expressed some concern about putting your children in this position and thought your friend, Hien Muñoz, may be a better choice. If this remains your wish, we encourage you to put this in writing. We also discussed the importance of considering a decision around resuscitation status. We discussed the realities and consequences of resuscitative efforts likely not helping to bridge you to meaningful recovery as your heart disease worsens. We know your goals for the immediate are for full treatments and care for hope of recovering. You have had many conversations about the risk factors of smoking and use of cocaine putting you at high risk for worsening condition and death. You have stated your desire to work on sobriety.         Because of the importance of this information, we are providing you with a printed copy to share with other healthcare providers after this hospitalization is complete.
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
## 2019-06-04 NOTE — PALLIATIVE CARE
Palliative Medicine Social Work    Chart reviewed and note events of weekend; the suspicion of cocaine use in hospital; positive urine screen and need for sitter. Patient has been resistant to conversations regarding AMD and code status. Our team does not have much to offer at this time. Will sign off. Please re-consult if we can be of service. Thank you for the opportunity to be involved in the care of Ms. Kasper. Nuria Adams, [employer], [facility]-  Palliative Medicine   Respecting Choices ® ACP Facilitator   [phone]

## 2019-06-04 NOTE — DISCHARGE INSTRUCTIONS
Please make an appointment with Dr. Buddy Cunningham for outpatient follow up. We will remove the staples from the Impella site on your first follow up clinic visit. Call the [facility] office for any questions. Learning About Heart Failure Zones  What are heart failure zones? Heart failure zones give you an easy way to see changes in your heart failure symptoms. They also tell you when you need to get help. Check every day to see which zone you are in. Green zone. You are doing well. This is where you want to be. · Your weight is stable. This means it is not going up or down. · You breathe easily. · You are sleeping well. You are able to lie flat without shortness of breath. · You can do your usual activities. Yellow zone. Be careful. Your symptoms are changing. Call your doctor. · You have new or increased shortness of breath. · You are dizzy or lightheaded, or you feel like you may faint. · You have sudden weight gain, such as more than 2 to 3 pounds in a day or 5 pounds in a week. (Your doctor may suggest a different range of weight gain.)  · You have increased swelling in your legs, ankles, or feet. · You are so tired or weak that you cannot do your usual activities. · You are not sleeping well. Shortness of breath wakes you up at night. You need extra pillows. Your doctor's name: ____________________________________________________________  Your doctor's contact information: _________________________________________________  Red zone. This is an emergency. Call 911. You have symptoms of sudden heart failure, such as:  · You have severe trouble breathing. · You cough up pink, foamy mucus. · You have a new irregular or fast heartbeat. You have symptoms of a heart attack. These may include:  · Chest pain or pressure, or a strange feeling in the chest.  · Sweating. · Shortness of breath. · Nausea or vomiting.   · Pain, pressure, or a strange feeling in the back, neck, jaw, or upper belly or in one or both shoulders or arms. · Lightheadedness or sudden weakness. · A fast or irregular heartbeat. If you have symptoms of a heart attack: After you call 911, the  may tell you to chew 1 adult-strength or 2 to 4 low-dose aspirin. Wait for an ambulance. Do not try to drive yourself. Follow-up care is a key part of your treatment and safety. Be sure to make and go to all appointments, and call your doctor if you are having problems. It's also a good idea to know your test results and keep a list of the medicines you take. Where can you learn more? Go to http://nicolásmYwindowjodie.info/. Enter T174 in the search box to learn more about \"Learning About Heart Failure Zones. \"  Current as of: July 22, 2018  Content Version: 11.9  © 5201-5663 [employer]. Care instructions adapted under license by [employer] (which disclaims liability or warranty for this information). If you have questions about a medical condition or this instruction, always ask your healthcare professional. [address] any warranty or liability for your use of this information. Avoiding Triggers With Heart Failure: Care Instructions  Your Care Instructions    Triggers are anything that make your heart failure flare up. A flare-up is also called \"sudden heart failure\" or \"acute heart failure. \" When you have a flare-up, fluid builds up in your lungs, and you have problems breathing. You might need to go to the hospital. By watching for changes in your condition and avoiding triggers, you can prevent heart failure flare-ups. Follow-up care is a key part of your treatment and safety. Be sure to make and go to all appointments, and call your doctor if you are having problems. It's also a good idea to know your test results and keep a list of the medicines you take. How can you care for yourself at home?   Watch for changes in your weight and condition  · Weigh yourself without clothing at the same time each day. Record your weight. Call your doctor if you have sudden weight gain, such as more than 2 to 3 pounds in a day or 5 pounds in a week. (Your doctor may suggest a different range of weight gain.) A sudden weight gain may mean that your heart failure is getting worse. · Keep a daily record of your symptoms. Write down any changes in how you feel, such as new shortness of breath, cough, or problems eating. Also record if your ankles are more swollen than usual and if you feel more tired than usual. Note anything that you ate or did that could have triggered these changes. Limit sodium  Sodium causes your body to hold on to extra water. This may cause your heart failure symptoms to get worse. People get most of their sodium from processed foods. Fast food and restaurant meals also tend to be very high in sodium. · Your doctor may suggest that you limit sodium to 2,000 milligrams (mg) a day or less. That is less than 1 teaspoon of salt a day, including all the salt you eat in cooking or in packaged foods. · Read food labels on cans and food packages. They tell you how much sodium you get in one serving. Check the serving size. If you eat more than one serving, you are getting more sodium. · Be aware that sodium can come in forms other than salt, including monosodium glutamate (MSG), sodium citrate, and sodium bicarbonate (baking soda). MSG is often added to Asian food. You can sometimes ask for food without MSG or salt. · Slowly reducing salt will help you adjust to the taste. Take the salt shaker off the table. · Flavor your food with garlic, lemon juice, onion, vinegar, herbs, and spices instead of salt. Do not use soy sauce, steak sauce, onion salt, garlic salt, mustard, or ketchup on your food, unless it is labeled \"low-sodium\" or \"low-salt. \"  · Make your own salad dressings, sauces, and ketchup without adding salt.   · Use fresh or frozen ingredients, instead of canned ones, whenever you can. Choose low-sodium canned goods. · Eat less processed food and food from restaurants, including fast food. Exercise as directed  Moderate, regular exercise is very good for your heart. It improves your blood flow and helps control your weight. But too much exercise can stress your heart and cause a heart failure flare-up. · Check with your doctor before you start an exercise program.  · Walking is an easy way to get exercise. Start out slowly. Gradually increase the length and pace of your walk. Swimming, riding a bike, and using a treadmill are also good forms of exercise. · When you exercise, watch for signs that your heart is working too hard. You are pushing yourself too hard if you cannot talk while you are exercising. If you become short of breath or dizzy or have chest pain, stop, sit down, and rest.  · Do not exercise when you do not feel well. Take medicines correctly  · Take your medicines exactly as prescribed. Call your doctor if you think you are having a problem with your medicine. · Make a list of all the medicines you take. Include those prescribed to you by other doctors and any over-the-counter medicines, vitamins, or supplements you take. Take this list with you when you go to any doctor. · Take your medicines at the same time every day. It may help you to post a list of all the medicines you take every day and what time of day you take them. · Make taking your medicine as simple as you can. Plan times to take your medicines when you are doing other things, such as eating a meal or getting ready for bed. This will make it easier to remember to take your medicines. · Get organized. Use helpful tools, such as daily or weekly pill containers. When should you call for help?   Call 911 if you have symptoms of sudden heart failure such as:    · You have severe trouble breathing.     · You cough up pink, foamy mucus.     · You have a new irregular or rapid heartbeat.    Call your doctor now or seek immediate medical care if:    · You have new or increased shortness of breath.     · You are dizzy or lightheaded, or you feel like you may faint.     · You have sudden weight gain, such as more than 2 to 3 pounds in a day or 5 pounds in a week. (Your doctor may suggest a different range of weight gain.)     · You have increased swelling in your legs, ankles, or feet.     · You are suddenly so tired or weak that you cannot do your usual activities.    Watch closely for changes in your health, and be sure to contact your doctor if you develop new symptoms. Where can you learn more? Go to http://nicolás-jodie.info/. Enter R874 in the search box to learn more about \"Avoiding Triggers With Heart Failure: Care Instructions. \"  Current as of: July 22, 2018  Content Version: 11.9  © 3421-4253 [employer]. Care instructions adapted under license by [employer] (which disclaims liability or warranty for this information). If you have questions about a medical condition or this instruction, always ask your healthcare professional. [address] any warranty or liability for your use of this information. Patient Education        Chronic Obstructive Pulmonary Disease (COPD): Care Instructions  Your Care Instructions    Chronic obstructive pulmonary disease (COPD) is a general term for a group of lung diseases, including emphysema and chronic bronchitis. People with COPD have decreased airflow in and out of the lungs, which makes it hard to breathe. The airways also can get clogged with thick mucus. Cigarette smoking is a major cause of COPD. Although there is no cure for COPD, you can slow its progress. Following your treatment plan and taking care of yourself can help you feel better and live longer. Follow-up care is a key part of your treatment and safety.  Be sure to make and go to all appointments, and call your doctor if you are having problems. It's also a good idea to know your test results and keep a list of the medicines you take. How can you care for yourself at home?   Staying healthy    · Do not smoke. This is the most important step you can take to prevent more damage to your lungs. If you need help quitting, talk to your doctor about stop-smoking programs and medicines. These can increase your chances of quitting for good.     · Avoid colds and flu. Get a pneumococcal vaccine shot. If you have had one before, ask your doctor whether you need a second dose. Get the flu vaccine every fall. If you must be around people with colds or the flu, wash your hands often.     · Avoid secondhand smoke, air pollution, and high altitudes. Also avoid cold, dry air and hot, humid air. Stay at home with your windows closed when air pollution is bad.    Medicines and oxygen therapy    · Take your medicines exactly as prescribed. Call your doctor if you think you are having a problem with your medicine.     · You may be taking medicines such as:  ? Bronchodilators. These help open your airways and make breathing easier. Bronchodilators are either short-acting (work for 6 to 9 hours) or long-acting (work for 24 hours). You inhale most bronchodilators, so they start to act quickly. Always carry your quick-relief inhaler with you in case you need it while you are away from home. ? Corticosteroids (prednisone, budesonide). These reduce airway inflammation. They come in pill or inhaled form. You must take these medicines every day for them to work well.     · A spacer may help you get more inhaled medicine to your lungs. Ask your doctor or pharmacist if a spacer is right for you. If it is, ask how to use it properly.     · Do not take any vitamins, over-the-counter medicine, or herbal products without talking to your doctor first.     · If your doctor prescribed antibiotics, take them as directed. Do not stop taking them just because you feel better.  You need to take the full course of antibiotics.     · Oxygen therapy boosts the amount of oxygen in your blood and helps you breathe easier. Use the flow rate your doctor has recommended, and do not change it without talking to your doctor first.   Activity    · Get regular exercise. Walking is an easy way to get exercise. Start out slowly, and walk a little more each day.     · Pay attention to your breathing. You are exercising too hard if you cannot talk while you are exercising.     · Take short rest breaks when doing household chores and other activities.     · Learn breathing methods--such as breathing through pursed lips--to help you become less short of breath.     · If your doctor has not set you up with a pulmonary rehabilitation program, talk to him or her about whether rehab is right for you. Rehab includes exercise programs, education about your disease and how to manage it, help with diet and other changes, and emotional support. Diet    · Eat regular, healthy meals. Use bronchodilators about 1 hour before you eat to make it easier to eat. Eat several small meals instead of three large ones. Drink beverages at the end of the meal. Avoid foods that are hard to chew.     · Eat foods that contain protein so that you do not lose muscle mass.     · Talk with your doctor if you gain too much weight or if you lose weight without trying.    Mental health    · Talk to your family, friends, or a therapist about your feelings. It is normal to feel frightened, angry, hopeless, helpless, and even guilty. Talking openly about bad feelings can help you cope. If these feelings last, talk to your doctor. When should you call for help? Call 911 anytime you think you may need emergency care.  For example, call if:    · You have severe trouble breathing.    Call your doctor now or seek immediate medical care if:    · You have new or worse trouble breathing.     · You cough up blood.     · You have a fever.    Watch closely for changes in your health, and be sure to contact your doctor if:    · You cough more deeply or more often, especially if you notice more mucus or a change in the color of your mucus.     · You have new or worse swelling in your legs or belly.     · You are not getting better as expected. Where can you learn more? Go to http://nicolás-jodie.info/. Isidro Shaw in the search box to learn more about \"Chronic Obstructive Pulmonary Disease (COPD): Care Instructions. \"  Current as of: September 5, 2018  Content Version: 11.9  © 1315-7449 [employer]. Care instructions adapted under license by [employer] (which disclaims liability or warranty for this information). If you have questions about a medical condition or this instruction, always ask your healthcare professional. [address] any warranty or liability for your use of this information. Smoking Cessation Program: This is a free, phone/text/email based, smoking cessation program. The program is individualized to meet each patient's needs. To enroll use the link - [employer]. com/quit or text Stephanie Bates to [phone] from any smart phone.

## 2019-06-04 NOTE — PROGRESS NOTES
CM visited with patient this AM to offer [facility] for CHF after discharge. Patient is agreeable to [facility] home visit. CM provided patient resources for counseling services and substance abuse recovery resources. Patient accepted information packet. Patient confirmed she has transportation home from the hospital when ready for discharge. CM sent [facility] referral to [facility]. 1214 -  received call from [facility]. Patient has been declined for [facility] due to testing positive for substance abuse while inpatient at [facility].     Carine Cutler, MPH

## 2019-06-04 NOTE — PROGRESS NOTES
[facility] VAD/Heart Failure Progress Note    Admit Date: 2019  POD:  7 Days Post-Op    Procedure:  Procedure(s):  IMPELLA REMOVAL        Subjective:   Mild dyspnea, fatigue, and weakness; denies chest pain      Objective:   Vitals:  Blood pressure 100/60, pulse 88, temperature 98.1 °F (36.7 °C), resp. rate 14, height 5' 2\" (1.575 m), weight 109 lb 12.6 oz (49.8 kg), last menstrual period 2011, SpO2 94 %. Temp (24hrs), Av.1 °F (36.7 °C), Min:97.4 °F (36.3 °C), Max:98.9 °F (37.2 °C)    Hemodynamics:   CO: CO (l/min): 3.2 l/min   CI: CI (l/min/m2): 2 l/min/m2   CVP: CVP (mmHg): 9 mmHg (19 1600)   SVR: SVR (dyne*sec)/cm5: 1382 (dyne*sec)/cm5 (19 3722)   PAP Systolic: PAP Systolic: 12 (84//38 0782)   PAP Diastolic: PAP Diastolic: 7 (34/49/00 1788)   PVR:     SV02: SVO2 (%): (Unable to calibrate/drawback on PA catheter. MD aware.) (19)   SCV02:      VAD Interrogation:      EKG/Rhythm:      Extubation Date / Time:     CT Output:     Ventilator:  Ventilator Volumes  Vt Set (ml): 440 ml (19)  Vt Exhaled (Machine Breath) (ml): 349 ml (19)  Ve Observed (l/min): 8.55 l/min (19 180)    Oxygen Therapy:  Oxygen Therapy  O2 Sat (%): 94 % (19 1109)  Pulse via Oximetry: 93 beats per minute (19 0813)  O2 Device: Room air (19 0824)  O2 Flow Rate (L/min): 2 l/min (19 2100)  O2 Temperature: 39.2 °F (4 °C) (19 0600)  FIO2 (%): 40 % (19 191)    CXR:    Admission Weight: Last Weight   Weight: 125 lb (56.7 kg) Weight: 109 lb 12.6 oz (49.8 kg)     Intake / Output / Drain:  Current Shift: No intake/output data recorded.   Last 24 hrs.:     Intake/Output Summary (Last 24 hours) at 2019 1207  Last data filed at 2019 0301  Gross per 24 hour   Intake 820 ml   Output 200 ml   Net 620 ml           Recent Labs     19  0317   CPK 82   CKMB 8.2*   TROIQ 0.23*     Recent Labs     19  1057 19  0317 06/03/19  0306 06/02/19  0436   NA  --  136 135* 135*   K  --  3.8 3.8 3.8   CO2  --  29 31 31   BUN  --  33* 31* 31*   CREA  --  1.22* 1.12* 1.16*   GLU  --  97 110* 97   WBC 12.8*  --   --   --    HGB 10.6*  --   --   --    HCT 33.1*  --   --   --    *  --   --   --      Recent Labs     06/04/19  0317 06/03/19  0306 06/02/19  0436   INR 1.1 1.1 1.1   PTP 10.8 10.9 11.0   APTT 23.8 22.6 23.0     No lab exists for component: KARMENNP        Current Facility-Administered Medications:     insulin lispro (HUMALOG) injection, , SubCUTAneous, AC&HS, Maddison Dao NP, Stopped at 06/03/19 1630    glucose chewable tablet 16 g, 4 Tab, Oral, PRN, Maddison Dao NP    dextrose (D50W) injection syrg 12.5-25 g, 12.5-25 g, IntraVENous, PRN, Maddison Dao NP    glucagon (GLUCAGEN) injection 1 mg, 1 mg, IntraMUSCular, PRN, Maddison Dao NP    metFORMIN (GLUCOPHAGE) tablet 500 mg, 500 mg, Oral, BID WITH MEALS, Maddison SCOTT NP, 500 mg at 06/04/19 8173    digoxin (LANOXIN) tablet 0.125 mg, 0.125 mg, Oral, DAILY, Maddison SCOTT NP, 0.125 mg at 06/04/19 7547    predniSONE (DELTASONE) tablet 10 mg, 10 mg, Oral, DAILY WITH BREAKFAST, Padmini Warren NP, 10 mg at 06/04/19 0959    clonazePAM (KlonoPIN) tablet 0.5 mg, 0.5 mg, Oral, BID, Dorie Rosado MD, 0.5 mg at 06/04/19 0959    insulin glargine (LANTUS) injection 8 Units, 8 Units, SubCUTAneous, DAILY, Kaelyn Sands MD, 8 Units at 06/04/19 1035    chlorhexidine (PERIDEX) 0.12 % mouthwash 15 mL, 15 mL, Oral, BID, Quang Frausto MD, 15 mL at 06/04/19 1010    magnesium oxide (MAG-OX) tablet 400 mg, 400 mg, Oral, DAILY, Kaelyn Sands MD, 400 mg at 06/04/19 0959    pravastatin (PRAVACHOL) tablet 40 mg, 40 mg, Oral, QHS, Brianne Ibanez, INDIRA, 40 mg at 06/03/19 2134    polyethylene glycol (MIRALAX) packet 17 g, 17 g, Oral, DAILY, Giancarlo De La Cruz MD, 17 g at 06/03/19 0817    senna-docusate (PERICOLACE) 8.6-50 mg per tablet 1 Tab, 1 Tab, Oral, DAILY, Marilia Townsend MD, 1 Tab at 06/03/19 0817    aspirin delayed-release tablet 81 mg, 81 mg, Oral, DAILY, PolliardVikki, NP, 81 mg at 06/04/19 0959    pantoprazole (PROTONIX) tablet 40 mg, 40 mg, Oral, ACB, Polliard, Vikki Ying T, NP, 40 mg at 06/04/19 0635    0.9% sodium chloride infusion, 9 mL/hr, IntraVENous, CONTINUOUS, Farrah Frausto MD, Last Rate: 12 mL/hr at 05/28/19 0217, 12 mL/hr at 05/28/19 0217    sodium chloride (NS) flush 5-40 mL, 5-40 mL, IntraVENous, Q8H, Chinyere Gan MD, 10 mL at 06/04/19 0636    sodium chloride (NS) flush 5-40 mL, 5-40 mL, IntraVENous, PRN, Chinyere Gan MD, 10 mL at 06/04/19 1005    nitroglycerin (NITROSTAT) tablet 0.4 mg, 0.4 mg, SubLINGual, Q5MIN PRN, Gene Nails MD    oxyCODONE-acetaminophen (PERCOCET) 5-325 mg per tablet 1 Tab, 1 Tab, Oral, Q4H PRN, Wei Gan MD, 1 Tab at 05/30/19 1546    budesonide (PULMICORT) 250 mcg/2ml nebulizer susp, 250 mcg, Nebulization, BID RT, Chinyere Gan MD, 250 mcg at 06/04/19 9333    acetaminophen (TYLENOL) tablet 650 mg, 650 mg, Oral, Q4H PRN, Lonnie Richardson MD, 650 mg at 05/23/19 2049        A/P      1. Acute systolic heart failure (NYHA class IV on admission):  dig, aldactone. 2. Atelectasis: Encourage I/S,   3. Endocrinological derangement: On hydrocortisone  AHF/endocrine. Endocrine following. 4. Hx arthritis, fibromyalgia, chronic pain, DJD  5. GERD: protonix  6. Hx stroke: pt reports stroke in the past, no deficits  7. COPD: Current smoker, smoking cessation education. 8. Anemia: rmonitor. 9. Hyperglycemia:Lantus, SSI. Cont  10.  Leukocytosis: on steroids,     Risk of morbidity and mortality - high  Medical decision making - high complexity         Signed By: Lorraine Holland MD

## 2019-06-04 NOTE — DISCHARGE SUMMARY
[facility] Discharge Summary     Patient ID:  Regan Choe  [id number]  30 y.o.  1960    Admit date: 5/18/2019    Discharge date: 6/4/2019     Admitting Physician: Benji Hankins MD     Referring Cardiologist:  None    PCP:  Cindy Rodrigues MD    DISCHARGE DIAGNOSIS:   Acute systolic heart failure, LVEF < 15%  NSTEMI  C/b cardiogenic shock  S/p Impella bridge to recovery  Recovery of LVEF to 55% (normal LVEF)  Non-ischemic cardiomyopathy likely due to cocaine use  Coronary artery disease, 60% LAD  COPD with cough  Active cocaine abuse, active  Positive cocaine urine drug screens while inpatient  Anxiety  Tobacco abuse, active    Discharged Condition: good, improved and stable    Disposition: home, see patient instructions for treatment and plan    Procedures for this admission:    Impella 5.0 implant and explant    Discharge Medications:   Discharge Medication List as of 6/4/2019  4:27 PM      START taking these medications    Details   predniSONE (DELTASONE) 10 mg tablet Take 10 mg by mouth daily (with breakfast). , Normal, Disp-30 Tab, R-1      aspirin delayed-release 81 mg tablet Take 1 Tab by mouth daily. , Normal, Disp-30 Tab, R-3      metFORMIN (GLUCOPHAGE) 500 mg tablet Take 1 Tab by mouth two (2) times daily (with meals). , Normal, Disp-60 Tab, R-1      magnesium oxide (MAG-OX) 400 mg tablet Take 1 Tab by mouth daily. , Normal, Disp-60 Tab, R-1      glucose 4 gram chewable tablet Take 4 Tabs by mouth as needed for Other (hypoglycemia). , Normal, Disp-8 Tab, R-0      polyethylene glycol (MIRALAX) 17 gram packet Take 1 Packet by mouth daily. , Normal, Disp-30 Packet, R-11      pantoprazole (PROTONIX) 40 mg tablet Take 1 Tab by mouth Daily (before breakfast). , Normal, Disp-30 Tab, R-1      pravastatin (PRAVACHOL) 40 mg tablet Take 1 Tab by mouth nightly., Normal, Disp-30 Tab, R-1         CONTINUE these medications which have CHANGED    Details   albuterol (PROVENTIL HFA, VENTOLIN HFA, PROAIR HFA) 90 mcg/actuation inhaler INHALE 2 PUFFS BY MOUTH EVERY 4 HOURS AS NEEDED FOR WHEEZING, Normal**Patient requests 90 days supply**Disp-1 Inhaler, R-1         CONTINUE these medications which have NOT CHANGED    Details   ondansetron (ZOFRAN ODT) 8 mg disintegrating tablet Take 1 Tab by mouth every eight (8) hours as needed for Nausea. , Print, Disp-8 Tab, R-0      amitriptyline (ELAVIL) 75 mg tablet Take 100 mg by mouth nightly., Historical Med         STOP taking these medications       HYDROcodone-acetaminophen (NORCO) 5-325 mg per tablet Comments:   Reason for Stopping:         ALPRAZOLAM (XANAX PO) Comments:   Reason for Stopping:         diazepam (VALIUM) 5 mg tablet Comments:   Reason for Stopping:         oxyCODONE-acetaminophen (PERCOCET) 5-325 mg per tablet Comments:   Reason for Stopping:         ibuprofen (MOTRIN) 600 mg tablet Comments:   Reason for Stopping:         gabapentin (NEURONTIN) 400 mg capsule Comments:   Reason for Stopping:                 HPI: Humera Caballero is a 66y.o. year old female with a history of diabetes, tobacco use, arthritis, asthma, cervical cancer, CKD, chronic back pain, fibromyalgia, DJD, GERD, who presented to [facility] on 5/18/19 with complaints of dyspnea and CP.  Inpatient evaluation revealed troponin 11.9 and EKG showed diffuse T wave inversions with prolonged QT and dx with NSTEMI.  TTE showed EF 16-20%, mild TR, mild MR, and small pericardial effusion.  She underwent LHC on 5/18 which showed insignificant CAD without intervention.  She has developed progressively worsening hypotension and on 5/21 developed cardiogenic shock with MAPs in the 50s despite inotropic and vasopressor support.  She was taken emergently to the OR for Impella placement.  Her vascular anatomy was prohibitive of Impella 5.0 placement and so Dr. Lester Maxwell placed an Impella CP. As of 5/28 was able to have Impella removed and weaned off of inotrope's. PA catheter removed with stable SVR. Transferred to [place] [id number] on 5/30/19.   On 6/2/19 she suspected to have active use of cocaine in the hospital.  Her urine drug screen on 6/2/19 was for cocaine. Troponins/CK were trending down. Her recent Echo on 6/3/19 revealed LVEF 56-60%. She is intolerant of GDMT; BB due to cocaine use, ACEi/ARB/AA due to hypotension. We will follow her in the [facility] clinic and have her follow up with Dr. Negro Rivera outpatient. Discharge Vital Signs:   Visit Vitals  /60 (BP 1 Location: Left arm, BP Patient Position: At rest)   Pulse 88   Temp 98.1 °F (36.7 °C)   Resp 14   Ht 5' 2\" (1.575 m)   Wt 109 lb 12.6 oz (49.8 kg)   SpO2 94%   BMI 20.08 kg/m²       Labs:   Recent Labs     06/04/19  1106 06/04/19  1057  06/04/19  0317   WBC  --  12.8*  --   --    HGB  --  10.6*  --   --    HCT  --  33.1*  --   --    PLT  --  534*  --   --    NA  --   --   --  136   K  --   --   --  3.8   BUN  --   --   --  33*   CREA  --   --   --  1.22*   GLU  --   --   --  97   GLUCPOC 162*  --    < >  --    INR  --   --   --  1.1    < > = values in this interval not displayed. Diagnostics:   ECHO 6/3/19:  Left Ventricle Normal cavity size, systolic function (ejection fraction normal) and diastolic function. Mild concentric hypertrophy. Normal septal motion. The estimated ejection fraction is 56 - 60%. Visually measured ejection fraction. No regional wall motion abnormality noted. Wall Scoring The left ventricular wall motion is normal.            Left Atrium Normal cavity size. No atrial septal defect present. No patent foramen ovale visualized. Right Ventricle Normal cavity size, wall thickness and global systolic function. Right Atrium Normal cavity size. Aortic Valve Normal valve structure, no stenosis and no regurgitation. Mitral Valve Normal valve structure, no stenosis and no regurgitation. Tricuspid Valve Normal valve structure, no stenosis and no regurgitation. Pulmonic Valve Pulmonic valve not well visualized.    Aorta Normal aortic root, ascending aortic, and aortic arch.   IVC/Hepatic Veins Normal structure. Normal central venous pressure (0-5 mmHg); IVC diameter is less than 21 mm and collapses more than 50% with respiration. Pericardium No evidence of pericardial effusion     EKG 6/4/19: Normal sinus rhythm rate 87bpm, QRS 76ms, QTC 366ms Possible Left atrial enlargement  RSR' or QR pattern in V1 suggests right ventricular conduction delay Septal infarct , age undetermined T wave abnormality, consider lateral ischemia   When compared with ECG of 31-MAY-2019 04:03, RSR' pattern in V1 is now present       Patient Instructions/Follow Up Care:  Discharge instructions were reviewed with the patient and family present. Questions were also answered at this time. Prescriptions and medications were reviewed. The patient has a follow up appointment with the Nurse Practitioner on 6/6/19 at 11:00 am.The patient was also instructed to follow up with her primary care physician as needed. The patient and family were encouraged to call with any questions or concerns. Signed:  Luisana Xiong NP  6/4/2019  2:36 PM     F ATTENDING ADDENDUM    Patient was seen and examined in person. Data and notes were reviewed. I have discussed and agree with the plan as noted in the NP note above without further additions.     Matheus Jay MD PhD  hCel Panchal

## 2019-06-04 NOTE — PROGRESS NOTES
1440hrs:  RN spoke with DTC. Patient may not need a glucometer to go home. Patient is still on prednisone and is \"pre-diabetic. \"

## 2019-06-05 ENCOUNTER — TELEPHONE (OUTPATIENT)
Dept: CARDIAC REHAB | Age: 59
End: 2019-06-05

## 2019-06-05 DIAGNOSIS — I50.23 CHF (CONGESTIVE HEART FAILURE), NYHA CLASS IV, ACUTE ON CHRONIC, SYSTOLIC (HCC): Primary | ICD-10-CM

## 2019-06-05 LAB — B BURGDOR IGG+IGM SER-ACNC: <0.91 ISR (ref 0–0.9)

## 2019-06-05 RX ORDER — PRAVASTATIN SODIUM 40 MG/1
40 TABLET ORAL
Qty: 90 TAB | Refills: 0 | Status: SHIPPED | OUTPATIENT
Start: 2019-06-05 | End: 2019-08-01 | Stop reason: SDUPTHER

## 2019-06-05 RX ORDER — PANTOPRAZOLE SODIUM 40 MG/1
40 TABLET, DELAYED RELEASE ORAL
Qty: 90 TAB | Refills: 0 | Status: SHIPPED | OUTPATIENT
Start: 2019-06-05 | End: 2019-07-31 | Stop reason: SDUPTHER

## 2019-06-05 NOTE — TELEPHONE ENCOUNTER
6/5/2019 Cardiac Rehab: Called Ms. Sindy Hunt  to discuss participation in the Cardiac Rehab Program following nstemi on 5/18/2019. Left voicemail message.  Janeen Camp RN

## 2019-06-06 ENCOUNTER — TELEPHONE (OUTPATIENT)
Dept: CARDIOLOGY CLINIC | Age: 59
End: 2019-06-06

## 2019-06-06 ENCOUNTER — OFFICE VISIT (OUTPATIENT)
Dept: CARDIOLOGY CLINIC | Age: 59
End: 2019-06-06

## 2019-06-06 VITALS
BODY MASS INDEX: 20.8 KG/M2 | DIASTOLIC BLOOD PRESSURE: 52 MMHG | OXYGEN SATURATION: 99 % | TEMPERATURE: 98.4 F | HEIGHT: 62 IN | SYSTOLIC BLOOD PRESSURE: 94 MMHG | HEART RATE: 80 BPM | RESPIRATION RATE: 20 BRPM | WEIGHT: 113 LBS

## 2019-06-06 DIAGNOSIS — R06.02 SHORTNESS OF BREATH: ICD-10-CM

## 2019-06-06 DIAGNOSIS — I42.8 OTHER CARDIOMYOPATHY (HCC): Primary | ICD-10-CM

## 2019-06-06 DIAGNOSIS — M79.7 FIBROMYALGIA: ICD-10-CM

## 2019-06-06 DIAGNOSIS — I21.4 NSTEMI (NON-ST ELEVATED MYOCARDIAL INFARCTION) (HCC): ICD-10-CM

## 2019-06-06 RX ORDER — CLONAZEPAM 0.5 MG/1
TABLET ORAL
COMMUNITY
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
## 2019-06-06 NOTE — TELEPHONE ENCOUNTER
I called Isa Whitehead, patient's daughter, advised her that referral will be sent by our office.   Also advised her to have PCP order meter and strips and [employer] will possibly cover some of cost.

## 2019-06-06 NOTE — PATIENT INSTRUCTIONS
Continue your current medications. We will call you and review your lab results. Follow up with Dr. Suzanne Velasquez. Follow up with Dr. Radha Drew - please make an appointment today. Please make an appointment with Dr. Jan Lewis to have your staples removed next week. Call our office for weight gain > 2 lbs overnight or > 5 lbs in 1 month. Follow up in 1 month.

## 2019-06-06 NOTE — TELEPHONE ENCOUNTER
Aby Baker. Said called Dr. Osman Carney office to schedule appt. There are no available appts until August 23. She said if notes/ are sent to over to them ASAP, then they will get her in sooner. Needs  Glucose meter. Please call.     [phone]

## 2019-06-07 LAB
ALBUMIN SERPL-MCNC: 4.3 G/DL (ref 3.5–5.5)
ALBUMIN/GLOB SERPL: 2.2 {RATIO} (ref 1.2–2.2)
ALP SERPL-CCNC: 100 IU/L (ref 39–117)
ALT SERPL-CCNC: 23 IU/L (ref 0–32)
AST SERPL-CCNC: 25 IU/L (ref 0–40)
BILIRUB SERPL-MCNC: 0.3 MG/DL (ref 0–1.2)
BUN SERPL-MCNC: 17 MG/DL (ref 6–24)
BUN/CREAT SERPL: 18 (ref 9–23)
CALCIUM SERPL-MCNC: 9.6 MG/DL (ref 8.7–10.2)
CHLORIDE SERPL-SCNC: 101 MMOL/L (ref 96–106)
CO2 SERPL-SCNC: 23 MMOL/L (ref 20–29)
CREAT SERPL-MCNC: 0.97 MG/DL (ref 0.57–1)
GLOBULIN SER CALC-MCNC: 2 G/DL (ref 1.5–4.5)
GLUCOSE SERPL-MCNC: 115 MG/DL (ref 65–99)
NT-PROBNP SERPL-MCNC: 3000 PG/ML (ref 0–287)
POTASSIUM SERPL-SCNC: 4 MMOL/L (ref 3.5–5.2)
PROT SERPL-MCNC: 6.3 G/DL (ref 6–8.5)
SODIUM SERPL-SCNC: 142 MMOL/L (ref 134–144)

## 2019-06-08 LAB
Lab: NORMAL
REFERENCE LAB,REFLB: NORMAL
TEST DESCRIPTION:,ATST: NORMAL

## 2019-06-10 ENCOUNTER — TELEPHONE (OUTPATIENT)
Dept: CARDIAC REHAB | Age: 59
End: 2019-06-10

## 2019-06-10 NOTE — TELEPHONE ENCOUNTER
6/10/2019 Cardiac Rehab: Called Ms. Socrates Morales  to discuss participation in the Cardiac Rehab Program . Left voicemail message. Final call 6/17 if no return call. Anastacio Egan RN      6/5/2019 Cardiac Rehab: Called Ms. Socrates Morales  to discuss participation in the Cardiac Rehab Program following nstemi on 5/18/2019. Left voicemail message.  Anastacio Egan RN

## 2019-06-10 NOTE — TELEPHONE ENCOUNTER
6/10/2019 Cardiac Rehab: Received a call from Ms. Ubaldo Menezse  to discuss participation in the Cardiac Rehab Program. She is on her way to a appt. with her PCP and agreed to call us tomorrow. Samaria Webb RN      6/10/2019 Cardiac Rehab: Called Ms. Ubaldo Menezes  to discuss participation in the Cardiac Rehab Program . Left voicemail message. Final call 6/17 if no return call. Samaria Webb RN        6/5/2019 Cardiac Rehab: Joeann Lie Donnetta Collet discuss participation in the Cardiac Rehab Program following nstemi on 5/18/2019.  Left voicemail Jaclyn Gayle RN

## 2019-06-11 ENCOUNTER — TELEPHONE (OUTPATIENT)
Dept: CARDIAC REHAB | Age: 59
End: 2019-06-11

## 2019-06-11 ENCOUNTER — TELEPHONE (OUTPATIENT)
Dept: CARDIOLOGY CLINIC | Age: 59
End: 2019-06-11

## 2019-06-11 NOTE — TELEPHONE ENCOUNTER
Patient scheduled to see  (Endocrinology)  6-12-19  11:10am    [address] [place] [address] [address]    Information given to patient's daughter - she states understanding.

## 2019-06-11 NOTE — TELEPHONE ENCOUNTER
6/11/2019 Cardiac Rehab: Errol Jama  to discuss participation in the Cardiac Rehab Program following nstemi on 5/18/2019. Left voicemail message as final call. Gabino Murphy RN    6/10/2019 Cardiac Rehab: Received a call from Ms. Regan Choe  to discuss participation in the Cardiac Rehab Program. She is on her way to a appt. with her PCP and agreed to call us tomorrow. Esme Mcdowell RN        6/10/2019 Cardiac Rehab: Errol Jama  to discuss participation in the Cardiac Rehab Program . Left voicemail message. Final call 6/17 if no return Etta Hodge RN        6/5/2019 Cardiac Rehab: Errol Jama  to discuss participation in the Cardiac Rehab Program following nstemi on 5/18/2019.  Left voicemail Ryan Bhardwaj RN

## 2019-06-14 ENCOUNTER — OFFICE VISIT (OUTPATIENT)
Dept: CARDIOLOGY CLINIC | Age: 59
End: 2019-06-14

## 2019-06-14 VITALS
DIASTOLIC BLOOD PRESSURE: 70 MMHG | OXYGEN SATURATION: 97 % | WEIGHT: 115 LBS | TEMPERATURE: 98.5 F | SYSTOLIC BLOOD PRESSURE: 130 MMHG | BODY MASS INDEX: 21.16 KG/M2 | HEART RATE: 96 BPM | HEIGHT: 62 IN | RESPIRATION RATE: 20 BRPM

## 2019-06-14 DIAGNOSIS — I21.4 NSTEMI (NON-ST ELEVATED MYOCARDIAL INFARCTION) (HCC): ICD-10-CM

## 2019-06-14 DIAGNOSIS — I50.23 CHF (CONGESTIVE HEART FAILURE), NYHA CLASS IV, ACUTE ON CHRONIC, SYSTOLIC (HCC): Primary | ICD-10-CM

## 2019-06-14 RX ORDER — GABAPENTIN 300 MG/1
300 CAPSULE ORAL 4 TIMES DAILY
COMMUNITY

## 2019-06-14 NOTE — PROGRESS NOTES
Patient: Jaspreet Steen   Age: 66 y.o. Patient Care Team:  Jordana Barajas MD as PCP - General (Internal Medicine)  Conchis Malave MD (Cardiothoracic Surgery)  Hannah Ibrahim MD (Cardiology)    Diagnosis: The primary encounter diagnosis was CHF (congestive heart failure), NYHA class IV, acute on chronic, systolic ([facility] [address] A diagnosis of NSTEMI (non-ST elevated myocardial infarction) [facility]) was also pertinent to this visit. Problem List:   Patient Active Problem List   Diagnosis Code    Cholelithiasis K80.20    Fibromyalgia M79.7    Reflux esophagitis K21.0    Carpal tunnel syndrome, right G56.01    Trigger thumb M65.319    NSTEMI (non-ST elevated myocardial infarction) ([facility] [address] I21.4    Chest pain R07.9    CHF (congestive heart failure), NYHA class IV, acute on chronic, systolic (HCC) T54.48      Surgery: Impella placed 5/21 and removed 5-28-19      HPI: The patient is here for staple removal.     Current Medications:   Current Outpatient Medications   Medication Sig Dispense Refill    gabapentin (NEURONTIN) 300 mg capsule Take 300 mg by mouth three (3) times daily.  clonazePAM (KLONOPIN) 0.5 mg tablet Take  by mouth nightly as needed.  pantoprazole (PROTONIX) 40 mg tablet Take 1 Tab by mouth Daily (before breakfast). 90 Tab 0    pravastatin (PRAVACHOL) 40 mg tablet Take 1 Tab by mouth nightly. 90 Tab 0    predniSONE (DELTASONE) 10 mg tablet Take 10 mg by mouth daily (with breakfast). 30 Tab 1    aspirin delayed-release 81 mg tablet Take 1 Tab by mouth daily. 30 Tab 3    metFORMIN (GLUCOPHAGE) 500 mg tablet Take 1 Tab by mouth two (2) times daily (with meals). 60 Tab 1    magnesium oxide (MAG-OX) 400 mg tablet Take 1 Tab by mouth daily. 60 Tab 1    glucose 4 gram chewable tablet Take 4 Tabs by mouth as needed for Other (hypoglycemia).  8 Tab 0    albuterol (PROVENTIL HFA, VENTOLIN HFA, PROAIR HFA) 90 mcg/actuation inhaler INHALE 2 PUFFS BY MOUTH EVERY 4 HOURS AS NEEDED FOR WHEEZING 1 Inhaler 1    amitriptyline (ELAVIL) 75 mg tablet Take 100 mg by mouth nightly. Vitals: Blood pressure 130/70, pulse 96, temperature 98.5 °F (36.9 °C), temperature source Oral, resp. rate 20, height 5' 2\" (1.575 m), weight 115 lb (52.2 kg), last menstrual period 06/26/2011, SpO2 97 %. Allergies: has No Known Allergies. Physical Exam:  Wounds: clean, dry, no drainage     Assessment/Plan:   1. impella site: Clean dry and intact, no erythema, drainage, or swelling. Staples removed by LPN.

## 2019-06-18 ENCOUNTER — OFFICE VISIT (OUTPATIENT)
Dept: CARDIOLOGY CLINIC | Age: 59
End: 2019-06-18

## 2019-06-18 VITALS
WEIGHT: 116 LBS | OXYGEN SATURATION: 98 % | HEIGHT: 62 IN | SYSTOLIC BLOOD PRESSURE: 106 MMHG | DIASTOLIC BLOOD PRESSURE: 60 MMHG | RESPIRATION RATE: 16 BRPM | TEMPERATURE: 98.6 F | HEART RATE: 85 BPM | BODY MASS INDEX: 21.35 KG/M2

## 2019-06-18 DIAGNOSIS — I21.4 NSTEMI (NON-ST ELEVATED MYOCARDIAL INFARCTION) (HCC): ICD-10-CM

## 2019-06-18 DIAGNOSIS — I50.23 CHF (CONGESTIVE HEART FAILURE), NYHA CLASS IV, ACUTE ON CHRONIC, SYSTOLIC (HCC): Primary | ICD-10-CM

## 2019-06-18 NOTE — PROGRESS NOTES
Patient: Cheng Woodruff   Age: 66 y.o. Patient Care Team:  James Nails MD as PCP - General (Internal Medicine)  Antonia Newton MD (Cardiothoracic Surgery)  Tevin Olivarez MD (Cardiology)    Diagnosis: The primary encounter diagnosis was CHF (congestive heart failure), NYHA class IV, acute on chronic, systolic ([facility] [address] A diagnosis of NSTEMI (non-ST elevated myocardial infarction) [facility]) was also pertinent to this visit. Problem List:   Patient Active Problem List   Diagnosis Code    Cholelithiasis K80.20    Fibromyalgia M79.7    Reflux esophagitis K21.0    Carpal tunnel syndrome, right G56.01    Trigger thumb M65.319    NSTEMI (non-ST elevated myocardial infarction) ([facility] [address] I21.4    Chest pain R07.9    CHF (congestive heart failure), NYHA class IV, acute on chronic, systolic (HCC) C13.97      Surgery: Impella placed 5/21 and removed 5-28-19    HPI: The patient is here for follow up regarding her impella site. A scab fell off it yesterday, but looked strange to her so she wanted to have it examined. No redness, warmth, or drainage from the area. Denies fever or chills. Current Medications:   Current Outpatient Medications   Medication Sig Dispense Refill    gabapentin (NEURONTIN) 300 mg capsule Take 300 mg by mouth three (3) times daily.  clonazePAM (KLONOPIN) 0.5 mg tablet Take  by mouth nightly as needed.  pantoprazole (PROTONIX) 40 mg tablet Take 1 Tab by mouth Daily (before breakfast). 90 Tab 0    pravastatin (PRAVACHOL) 40 mg tablet Take 1 Tab by mouth nightly. 90 Tab 0    predniSONE (DELTASONE) 10 mg tablet Take 10 mg by mouth daily (with breakfast). 30 Tab 1    aspirin delayed-release 81 mg tablet Take 1 Tab by mouth daily. 30 Tab 3    metFORMIN (GLUCOPHAGE) 500 mg tablet Take 1 Tab by mouth two (2) times daily (with meals). 60 Tab 1    magnesium oxide (MAG-OX) 400 mg tablet Take 1 Tab by mouth daily.  60 Tab 1    glucose 4 gram chewable tablet Take 4 Tabs by mouth as needed for Other (hypoglycemia). 8 Tab 0    albuterol (PROVENTIL HFA, VENTOLIN HFA, PROAIR HFA) 90 mcg/actuation inhaler INHALE 2 PUFFS BY MOUTH EVERY 4 HOURS AS NEEDED FOR WHEEZING 1 Inhaler 1    amitriptyline (ELAVIL) 75 mg tablet Take 100 mg by mouth nightly. Vitals: Height 5' 2\" (1.575 m), weight 116 lb (52.6 kg), last menstrual period 06/26/2011. Allergies: has No Known Allergies. Physical Exam:  Wounds: clean, dry, no drainage     Assessment/Plan:   1. impella site: Appears to be healing fine. Scab fell off on lateral portion, but no issue.

## 2019-07-02 ENCOUNTER — OFFICE VISIT (OUTPATIENT)
Dept: CARDIOLOGY CLINIC | Age: 59
End: 2019-07-02

## 2019-07-02 VITALS
TEMPERATURE: 98.4 F | DIASTOLIC BLOOD PRESSURE: 60 MMHG | HEART RATE: 64 BPM | HEIGHT: 62 IN | RESPIRATION RATE: 24 BRPM | SYSTOLIC BLOOD PRESSURE: 108 MMHG | WEIGHT: 118.6 LBS | OXYGEN SATURATION: 98 % | BODY MASS INDEX: 21.83 KG/M2

## 2019-07-02 DIAGNOSIS — I50.22 SYSTOLIC CHF, CHRONIC (HCC): Primary | ICD-10-CM

## 2019-07-02 DIAGNOSIS — Z79.52 CURRENT CHRONIC USE OF SYSTEMIC STEROIDS: ICD-10-CM
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
## 2019-07-02 NOTE — PATIENT INSTRUCTIONS
Labs ordered electronically to be done within 2 weeks We will call you to schedule bone density study We will call endocrinologist to see if appt can be moved up Return to [facility] in 3 months

## 2019-07-03 ENCOUNTER — TELEPHONE (OUTPATIENT)
Dept: CARDIOLOGY CLINIC | Age: 59
End: 2019-07-03

## 2019-07-03 DIAGNOSIS — M62.89 HYPOTONIA: ICD-10-CM

## 2019-07-03 DIAGNOSIS — I50.23 CHF (CONGESTIVE HEART FAILURE), NYHA CLASS IV, ACUTE ON CHRONIC, SYSTOLIC (HCC): Primary | ICD-10-CM

## 2019-07-03 NOTE — TELEPHONE ENCOUNTER
Message sent to Eboni Burleson for scheduling bone density scan. I called Dr. Temi Jackman office to request to move appt up to sooner appt per Dr. Cesia Mancia due to:  Hypotonia due to adrenal insufficiency, now on chronic prednisone    They will speak with Dr. Temi Jackman and call us back.

## 2019-07-05 ENCOUNTER — OFFICE VISIT (OUTPATIENT)
Dept: ENDOCRINOLOGY | Age: 59
End: 2019-07-05

## 2019-07-05 VITALS
DIASTOLIC BLOOD PRESSURE: 58 MMHG | SYSTOLIC BLOOD PRESSURE: 110 MMHG | WEIGHT: 118.2 LBS | HEART RATE: 61 BPM | HEIGHT: 62 IN | BODY MASS INDEX: 21.75 KG/M2

## 2019-07-05 DIAGNOSIS — R73.01 IMPAIRED FASTING GLUCOSE: ICD-10-CM

## 2019-07-05 DIAGNOSIS — E27.40 ADRENAL INSUFFICIENCY (HCC): Primary | ICD-10-CM

## 2019-07-05 RX ORDER — HYDROCORTISONE 10 MG/1
TABLET ORAL
Qty: 270 TAB | Refills: 3 | Status: SHIPPED | OUTPATIENT
Start: 2019-07-05 | End: 2020-07-29
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
## 2019-07-05 NOTE — PATIENT INSTRUCTIONS
Change prednisone to hydrocortisone:   20mg (2 tabs) each AM, 10mg (1 tab) each PM.     Continue the metformin 500mg tablet twice per day. Plan for an ACTH stimulatin test to be completed after you have been on hydrocortisone for at least 1 week. You will receive a call to schedule this test. It is very important to remember the following instruction. The evening before your test, hold that evening dose, and also the AM dose on the morning of your test, so that the medicine does not interfere with the test. Immediately after the test, you can resume your medicine and take your AM tablet. See the instructions below. ...................................................... Dirk Cuadra ACTH STIMULATION TEST  We would like to confirm that your adrenal gland is functioning well. Your adrenal gland produces a hormone called cortisol which is required for various critical functions. You have two adrenal glands, one on each side within the abdomen just above the kidneys. They are under the control of your pituitary gland. When your body needs more cortisol, a message from the pituitary gland (ACTH) is released which stimulates the secretion of more cortisol from the adrenal glands. A deficiency in cortisol can result in vague and unclear symptoms including worsening fatigue, weakness, and can result in abnormalities in your electrolytes. We will do the following test to check if your adrenal glands can be stimulated properly. Report to the outpatient infusion center located in [address] [place] [address] at [facility] (You will receive a call from them to schedule this, if you don't hear from them within 1 week, please let us know)    Step 1. You will have baseline blood test for cortisol & ACTH collected. Any other labs we ordered should also be collected at this time, since the steps below will only be collections for cortisol again. Step 2.  Only after a baseline cortisol level is collected, you will then receive an injection of a synthetic ACTH to stimulate your adrenal glands. Step 3. You will then have your cortisol level collected again 30 minutes after the ACTH injection. This is the second cortisol collection. Step 4. You will then have your cortisol level collected again 60 minutes after the ACTH injection (30 minutes after you prior one). This is the third cortisol collection. After the testing is completed, I will discuss the results with you when I receive it. Abnormal results may suggest that you need a supplemental daily glucocorticoid. *It is important that you be familiar with the steps above before you report to the infusion center, as you will want to make sure your test is being conducted as described here. Also it is critical to make sure the nurse has collected the ACTH level together with the cortisol and any other labs needed during step 1. Plan to return in 2 months for follow up visit.

## 2019-07-09 ENCOUNTER — TELEPHONE (OUTPATIENT)
Dept: CARDIOLOGY CLINIC | Age: 59
End: 2019-07-09

## 2019-07-09 NOTE — TELEPHONE ENCOUNTER
I called and spoke with patient's daughter in law ( on [employer]IP[employer]), advised her per patient request of new PCP recommendation:  [facility] [address] [place], [facility]   [phone]  She states she will call.

## 2019-07-10 LAB
ALDOST SERPL-MCNC: 3.3 NG/DL (ref 0–30)
BUN SERPL-MCNC: 14 MG/DL (ref 6–24)
BUN/CREAT SERPL: 13 (ref 9–23)
CALCIUM SERPL-MCNC: 9.2 MG/DL (ref 8.7–10.2)
CHLORIDE SERPL-SCNC: 99 MMOL/L (ref 96–106)
CO2 SERPL-SCNC: 28 MMOL/L (ref 20–29)
CREAT SERPL-MCNC: 1.09 MG/DL (ref 0.57–1)
DHEA-S SERPL-MCNC: 10.9 UG/DL (ref 29.4–220.5)
GLUCOSE SERPL-MCNC: 141 MG/DL (ref 65–99)
INTERPRETATION: NORMAL
POTASSIUM SERPL-SCNC: 4 MMOL/L (ref 3.5–5.2)
RENIN PLAS-CCNC: 0.83 NG/ML/HR (ref 0.17–5.38)
SODIUM SERPL-SCNC: 141 MMOL/L (ref 134–144)

## 2019-07-11 NOTE — PROGRESS NOTES
DHEA low at 10.9  Normal renin and aldosterone and sodium however  Awaiting ACTH stimulation test    Michael Graham T.  [address] [facility]  [facility]

## 2019-07-13 LAB
14.3.3 ETA, RHEUM. ARTHRITIS: <0.2 NG/ML
ALBUMIN SERPL-MCNC: 4 G/DL (ref 3.5–5.5)
ALP SERPL-CCNC: 107 IU/L (ref 39–117)
ALT SERPL-CCNC: 16 IU/L (ref 0–32)
AST SERPL-CCNC: 15 IU/L (ref 0–40)
BILIRUB DIRECT SERPL-MCNC: 0.05 MG/DL (ref 0–0.4)
BILIRUB SERPL-MCNC: <0.2 MG/DL (ref 0–1.2)
BUN SERPL-MCNC: 15 MG/DL (ref 6–24)
BUN/CREAT SERPL: 16 (ref 9–23)
CALCIUM SERPL-MCNC: 9.3 MG/DL (ref 8.7–10.2)
CHLORIDE SERPL-SCNC: 101 MMOL/L (ref 96–106)
CHOLEST SERPL-MCNC: 140 MG/DL (ref 100–199)
CK SERPL-CCNC: 48 U/L (ref 24–173)
CO2 SERPL-SCNC: 31 MMOL/L (ref 20–29)
CREAT SERPL-MCNC: 0.94 MG/DL (ref 0.57–1)
ERYTHROCYTE [DISTWIDTH] IN BLOOD BY AUTOMATED COUNT: 14.9 % (ref 12.3–15.4)
GLUCOSE SERPL-MCNC: 138 MG/DL (ref 65–99)
HCT VFR BLD AUTO: 37.1 % (ref 34–46.6)
HDLC SERPL-MCNC: 64 MG/DL
HGB BLD-MCNC: 11.8 G/DL (ref 11.1–15.9)
LDLC SERPL CALC-MCNC: 61 MG/DL (ref 0–99)
MCH RBC QN AUTO: 29.1 PG (ref 26.6–33)
MCHC RBC AUTO-ENTMCNC: 31.8 G/DL (ref 31.5–35.7)
MCV RBC AUTO: 91 FL (ref 79–97)
PLATELET # BLD AUTO: 395 X10E3/UL (ref 150–450)
POTASSIUM SERPL-SCNC: 3.7 MMOL/L (ref 3.5–5.2)
PROT SERPL-MCNC: 6.2 G/DL (ref 6–8.5)
RBC # BLD AUTO: 4.06 X10E6/UL (ref 3.77–5.28)
SODIUM SERPL-SCNC: 146 MMOL/L (ref 134–144)
TRIGL SERPL-MCNC: 74 MG/DL (ref 0–149)
TSH SERPL DL<=0.005 MIU/L-ACNC: 0.55 UIU/ML (ref 0.45–4.5)
VLDLC SERPL CALC-MCNC: 15 MG/DL (ref 5–40)
WBC # BLD AUTO: 9.9 X10E3/UL (ref 3.4–10.8)

## 2019-07-15 ENCOUNTER — HOSPITAL ENCOUNTER (OUTPATIENT)
Dept: MAMMOGRAPHY | Age: 59
Discharge: HOME | End: 2019-07-15
Attending: INTERNAL MEDICINE
Payer: MEDICARE

## 2019-07-15 DIAGNOSIS — M62.89 HYPOTONIA: ICD-10-CM

## 2019-07-15 DIAGNOSIS — Z12.31 VISIT FOR SCREENING MAMMOGRAM: ICD-10-CM

## 2019-07-15 DIAGNOSIS — I50.23 CHF (CONGESTIVE HEART FAILURE), NYHA CLASS IV, ACUTE ON CHRONIC, SYSTOLIC (HCC): ICD-10-CM

## 2019-07-15 PROCEDURE — 77067 SCR MAMMO BI INCL CAD: CPT

## 2019-07-15 PROCEDURE — 77080 DXA BONE DENSITY AXIAL: CPT

## 2019-07-16 ENCOUNTER — TELEPHONE (OUTPATIENT)
Dept: CARDIOLOGY CLINIC | Age: 59
End: 2019-07-16

## 2019-07-16 DIAGNOSIS — Z79.52 CURRENT CHRONIC USE OF SYSTEMIC STEROIDS: ICD-10-CM

## 2019-07-16 DIAGNOSIS — I50.22 SYSTOLIC CHF, CHRONIC (HCC): ICD-10-CM

## 2019-07-18 ENCOUNTER — HOSPITAL ENCOUNTER (OUTPATIENT)
Dept: MAMMOGRAPHY | Age: 59
Discharge: HOME | End: 2019-07-18
Attending: INTERNAL MEDICINE
Payer: MEDICARE

## 2019-07-18 DIAGNOSIS — R92.8 ABNORMAL MAMMOGRAM: ICD-10-CM

## 2019-07-18 PROCEDURE — 77061 BREAST TOMOSYNTHESIS UNI: CPT

## 2019-07-26 ENCOUNTER — TELEPHONE (OUTPATIENT)
Dept: CARDIOLOGY CLINIC | Age: 59
End: 2019-07-26

## 2019-07-26 NOTE — TELEPHONE ENCOUNTER
Patient called and stated she tried to make an appointment with a new PCP, Dr. Rosa Lewis, but they are not accepting new patients. She would like another PCP referral.  She also said she was waiting on a prescription for Vitamin C and to review her labs. Please advise.

## 2019-07-29 RX ORDER — ALBUTEROL SULFATE 90 UG/1
AEROSOL, METERED RESPIRATORY (INHALATION)
Qty: 18 G | Refills: 0 | Status: SHIPPED | OUTPATIENT
Start: 2019-07-29 | End: 2019-08-15 | Stop reason: SDUPTHER

## 2019-07-31 RX ORDER — PANTOPRAZOLE SODIUM 40 MG/1
40 TABLET, DELAYED RELEASE ORAL
Qty: 90 TAB | Refills: 1 | Status: SHIPPED | OUTPATIENT
Start: 2019-07-31

## 2019-08-01 ENCOUNTER — APPOINTMENT (OUTPATIENT)
Dept: GENERAL RADIOLOGY | Age: 59
End: 2019-08-01
Attending: PHYSICIAN ASSISTANT
Payer: MEDICARE

## 2019-08-01 ENCOUNTER — HOSPITAL ENCOUNTER (EMERGENCY)
Age: 59
Discharge: HOME | End: 2019-08-01
Attending: EMERGENCY MEDICINE
Payer: MEDICARE

## 2019-08-01 VITALS
HEART RATE: 98 BPM | TEMPERATURE: 97.8 F | DIASTOLIC BLOOD PRESSURE: 65 MMHG | BODY MASS INDEX: 21.53 KG/M2 | HEIGHT: 64 IN | RESPIRATION RATE: 16 BRPM | SYSTOLIC BLOOD PRESSURE: 136 MMHG | WEIGHT: 126.1 LBS | OXYGEN SATURATION: 95 %

## 2019-08-01 DIAGNOSIS — M54.42 LEFT-SIDED LOW BACK PAIN WITH LEFT-SIDED SCIATICA, UNSPECIFIED CHRONICITY: Primary | ICD-10-CM

## 2019-08-01 LAB
ALBUMIN SERPL-MCNC: 3.4 G/DL (ref 3.5–5)
ALBUMIN/GLOB SERPL: 0.8 {RATIO} (ref 1.1–2.2)
ALP SERPL-CCNC: 149 U/L (ref 45–117)
ALT SERPL-CCNC: 19 U/L (ref 12–78)
ANION GAP SERPL CALC-SCNC: 3 MMOL/L (ref 5–15)
APPEARANCE UR: CLEAR
AST SERPL-CCNC: 14 U/L (ref 15–37)
BACTERIA URNS QL MICRO: NEGATIVE /HPF
BASOPHILS # BLD: 0 K/UL (ref 0–0.1)
BASOPHILS NFR BLD: 0 % (ref 0–1)
BILIRUB SERPL-MCNC: 0.3 MG/DL (ref 0.2–1)
BILIRUB UR QL: NEGATIVE
BUN SERPL-MCNC: 15 MG/DL (ref 6–20)
BUN/CREAT SERPL: 14 (ref 12–20)
CALCIUM SERPL-MCNC: 9.3 MG/DL (ref 8.5–10.1)
CHLORIDE SERPL-SCNC: 102 MMOL/L (ref 97–108)
CO2 SERPL-SCNC: 34 MMOL/L (ref 21–32)
COLOR UR: ABNORMAL
COMMENT, HOLDF: NORMAL
CREAT SERPL-MCNC: 1.05 MG/DL (ref 0.55–1.02)
DIFFERENTIAL METHOD BLD: ABNORMAL
EOSINOPHIL # BLD: 0.1 K/UL (ref 0–0.4)
EOSINOPHIL NFR BLD: 1 % (ref 0–7)
EPITH CASTS URNS QL MICRO: ABNORMAL /LPF
ERYTHROCYTE [DISTWIDTH] IN BLOOD BY AUTOMATED COUNT: 13.3 % (ref 11.5–14.5)
GLOBULIN SER CALC-MCNC: 4.3 G/DL (ref 2–4)
GLUCOSE SERPL-MCNC: 220 MG/DL (ref 65–100)
GLUCOSE UR STRIP.AUTO-MCNC: 100 MG/DL
HCT VFR BLD AUTO: 39 % (ref 35–47)
HGB BLD-MCNC: 12.2 G/DL (ref 11.5–16)
HGB UR QL STRIP: ABNORMAL
IMM GRANULOCYTES # BLD AUTO: 0 K/UL (ref 0–0.04)
IMM GRANULOCYTES NFR BLD AUTO: 0 % (ref 0–0.5)
KETONES UR QL STRIP.AUTO: ABNORMAL MG/DL
LACTATE BLD-SCNC: 1.51 MMOL/L (ref 0.4–2)
LEUKOCYTE ESTERASE UR QL STRIP.AUTO: NEGATIVE
LYMPHOCYTES # BLD: 2.7 K/UL (ref 0.8–3.5)
LYMPHOCYTES NFR BLD: 24 % (ref 12–49)
MCH RBC QN AUTO: 29.2 PG (ref 26–34)
MCHC RBC AUTO-ENTMCNC: 31.3 G/DL (ref 30–36.5)
MCV RBC AUTO: 93.3 FL (ref 80–99)
MONOCYTES # BLD: 0.7 K/UL (ref 0–1)
MONOCYTES NFR BLD: 6 % (ref 5–13)
NEUTS SEG # BLD: 7.8 K/UL (ref 1.8–8)
NEUTS SEG NFR BLD: 69 % (ref 32–75)
NITRITE UR QL STRIP.AUTO: NEGATIVE
NRBC # BLD: 0 K/UL (ref 0–0.01)
NRBC BLD-RTO: 0 PER 100 WBC
PH UR STRIP: 5.5 [PH] (ref 5–8)
PLATELET # BLD AUTO: 378 K/UL (ref 150–400)
PMV BLD AUTO: 9.3 FL (ref 8.9–12.9)
POTASSIUM SERPL-SCNC: 3.5 MMOL/L (ref 3.5–5.1)
PROT SERPL-MCNC: 7.7 G/DL (ref 6.4–8.2)
PROT UR STRIP-MCNC: ABNORMAL MG/DL
RBC # BLD AUTO: 4.18 M/UL (ref 3.8–5.2)
RBC #/AREA URNS HPF: ABNORMAL /HPF (ref 0–5)
SAMPLES BEING HELD,HOLD: NORMAL
SODIUM SERPL-SCNC: 139 MMOL/L (ref 136–145)
SP GR UR REFRACTOMETRY: >1.03 (ref 1–1.03)
UR CULT HOLD, URHOLD: NORMAL
UROBILINOGEN UR QL STRIP.AUTO: 0.2 EU/DL (ref 0.2–1)
WBC # BLD AUTO: 11.4 K/UL (ref 3.6–11)
WBC URNS QL MICRO: ABNORMAL /HPF (ref 0–4)

## 2019-08-01 PROCEDURE — 36415 COLL VENOUS BLD VENIPUNCTURE: CPT

## 2019-08-01 PROCEDURE — 72100 X-RAY EXAM L-S SPINE 2/3 VWS: CPT

## 2019-08-01 PROCEDURE — 74011636637 HC RX REV CODE- 636/637: Performed by: PHYSICIAN ASSISTANT

## 2019-08-01 PROCEDURE — 83605 ASSAY OF LACTIC ACID: CPT

## 2019-08-01 PROCEDURE — 80053 COMPREHEN METABOLIC PANEL: CPT

## 2019-08-01 PROCEDURE — 85025 COMPLETE CBC W/AUTO DIFF WBC: CPT

## 2019-08-01 PROCEDURE — 74011250637 HC RX REV CODE- 250/637: Performed by: PHYSICIAN ASSISTANT

## 2019-08-01 PROCEDURE — 99284 EMERGENCY DEPT VISIT MOD MDM: CPT

## 2019-08-01 PROCEDURE — 74011250636 HC RX REV CODE- 250/636: Performed by: PHYSICIAN ASSISTANT

## 2019-08-01 PROCEDURE — 96360 HYDRATION IV INFUSION INIT: CPT

## 2019-08-01 PROCEDURE — 81001 URINALYSIS AUTO W/SCOPE: CPT

## 2019-08-01 RX ORDER — DIAZEPAM 5 MG/1
5 TABLET ORAL
Status: COMPLETED | OUTPATIENT
Start: 2019-08-01 | End: 2019-08-01

## 2019-08-01 RX ORDER — HYDROCODONE BITARTRATE AND ACETAMINOPHEN 7.5; 325 MG/1; MG/1
1 TABLET ORAL
Status: COMPLETED | OUTPATIENT
Start: 2019-08-01 | End: 2019-08-01

## 2019-08-01 RX ORDER — PRAVASTATIN SODIUM 40 MG/1
40 TABLET ORAL
Qty: 90 TAB | Refills: 0 | Status: SHIPPED | OUTPATIENT
Start: 2019-08-01 | End: 2019-10-29 | Stop reason: SDUPTHER

## 2019-08-01 RX ORDER — NAPROXEN 500 MG/1
500 TABLET ORAL
Qty: 20 TAB | Refills: 0 | Status: SHIPPED | OUTPATIENT
Start: 2019-08-01

## 2019-08-01 RX ORDER — PREDNISONE 20 MG/1
60 TABLET ORAL
Status: COMPLETED | OUTPATIENT
Start: 2019-08-01 | End: 2019-08-01

## 2019-08-01 RX ORDER — DIAZEPAM 5 MG/1
5 TABLET ORAL
Qty: 15 TAB | Refills: 0 | OUTPATIENT
Start: 2019-08-01 | End: 2021-12-17

## 2019-08-01 RX ORDER — LIDOCAINE 50 MG/G
PATCH TOPICAL
Qty: 15 EACH | Refills: 0 | Status: SHIPPED | OUTPATIENT
Start: 2019-08-01

## 2019-08-01 RX ORDER — LIDOCAINE 50 MG/G
PATCH TOPICAL
Qty: 1 EACH | Refills: 0 | Status: SHIPPED | OUTPATIENT
Start: 2019-08-01 | End: 2019-08-01

## 2019-08-01 RX ADMIN — SODIUM CHLORIDE 1000 ML: 900 INJECTION, SOLUTION INTRAVENOUS at 19:06

## 2019-08-01 RX ADMIN — PREDNISONE 60 MG: 20 TABLET ORAL at 19:06

## 2019-08-01 RX ADMIN — DIAZEPAM 5 MG: 5 TABLET ORAL at 19:06

## 2019-08-01 RX ADMIN — HYDROCODONE BITARTRATE AND ACETAMINOPHEN 1 TABLET: 7.5; 325 TABLET ORAL at 19:06

## 2019-08-01 NOTE — ED TRIAGE NOTES
Pt comes in for lower back pain x 3 days. Pt has taken tramadol at home with no relief. Pt denies SOB, CP, denies recent trauma.  Pt ambulatory in triage
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
## 2019-08-02 NOTE — DISCHARGE INSTRUCTIONS
Patient Education        Back Pain: Care Instructions  Your Care Instructions    Back pain has many possible causes. It is often related to problems with muscles and ligaments of the back. It may also be related to problems with the nerves, discs, or bones of the back. Moving, lifting, standing, sitting, or sleeping in an awkward way can strain the back. Sometimes you don't notice the injury until later. Arthritis is another common cause of back pain. Although it may hurt a lot, back pain usually improves on its own within several weeks. Most people recover in 12 weeks or less. Using good home treatment and being careful not to stress your back can help you feel better sooner. Follow-up care is a key part of your treatment and safety. Be sure to make and go to all appointments, and call your doctor if you are having problems. It's also a good idea to know your test results and keep a list of the medicines you take. How can you care for yourself at home? · Sit or lie in positions that are most comfortable and reduce your pain. Try one of these positions when you lie down:  ? Lie on your back with your knees bent and supported by large pillows. ? Lie on the floor with your legs on the seat of a sofa or chair. ? Lie on your side with your knees and hips bent and a pillow between your legs. ? Lie on your stomach if it does not make pain worse. · Do not sit up in bed, and avoid soft couches and twisted positions. Bed rest can help relieve pain at first, but it delays healing. Avoid bed rest after the first day of back pain. · Change positions every 30 minutes. If you must sit for long periods of time, take breaks from sitting. Get up and walk around, or lie in a comfortable position. · Try using a heating pad on a low or medium setting for 15 to 20 minutes every 2 or 3 hours. Try a warm shower in place of one session with the heating pad. · You can also try an ice pack for 10 to 15 minutes every 2 to 3 hours. Put a thin cloth between the ice pack and your skin. · Take pain medicines exactly as directed. ? If the doctor gave you a prescription medicine for pain, take it as prescribed. ? If you are not taking a prescription pain medicine, ask your doctor if you can take an over-the-counter medicine. · Take short walks several times a day. You can start with 5 to 10 minutes, 3 or 4 times a day, and work up to longer walks. Walk on level surfaces and avoid hills and stairs until your back is better. · Return to work and other activities as soon as you can. Continued rest without activity is usually not good for your back. · To prevent future back pain, do exercises to stretch and strengthen your back and stomach. Learn how to use good posture, safe lifting techniques, and proper body mechanics. When should you call for help? Call your doctor now or seek immediate medical care if:    · You have new or worsening numbness in your legs.     · You have new or worsening weakness in your legs. (This could make it hard to stand up.)     · You lose control of your bladder or bowels.    Watch closely for changes in your health, and be sure to contact your doctor if:    · You have a fever, lose weight, or don't feel well.     · You do not get better as expected. Where can you learn more? Go to http://nicolás-jodie.info/. Enter S942 in the search box to learn more about \"Back Pain: Care Instructions. \"  Current as of: September 20, 2018  Content Version: 12.1  © 6642-9868 [employer]. Care instructions adapted under license by [employer] (which disclaims liability or warranty for this information). If you have questions about a medical condition or this instruction, always ask your healthcare professional. [place] [address] any warranty or liability for your use of this information.

## 2019-08-02 NOTE — ED NOTES
Pt ambulatory to restroom for urine sample. Pt educated on and verbalizes understanding of clean catch specimen instructions.

## 2019-08-09 ENCOUNTER — TELEPHONE (OUTPATIENT)
Dept: CARDIOLOGY CLINIC | Age: 59
End: 2019-08-09

## 2019-08-09 NOTE — TELEPHONE ENCOUNTER
A prescription refill came from [facility] for metformin 500 mg tablets. I asked Dr. Arlen Harvey if she wanted to refill this script and she stated the patient should see her primary care doctor for this prescription. I called the patient and left a message for her to call me to explain.

## 2019-08-13 ENCOUNTER — TELEPHONE (OUTPATIENT)
Dept: CARDIOLOGY CLINIC | Age: 59
End: 2019-08-13

## 2019-08-13 NOTE — TELEPHONE ENCOUNTER
Patient called, left message for Kavya to call her back regarding medication refill. Please call her.     [phone]

## 2019-08-13 NOTE — TELEPHONE ENCOUNTER
I called patient and told her Max Ruby would like her to have her endocrinologist refill her prescription for metformin, as Max Ruby is her heart failure NP and the metformin is a diabetes medication. The patient expressed understanding.

## 2019-08-15 RX ORDER — ALBUTEROL SULFATE 90 UG/1
AEROSOL, METERED RESPIRATORY (INHALATION)
Qty: 18 G | Refills: 0 | Status: SHIPPED | OUTPATIENT
Start: 2019-08-15

## 2019-10-29 RX ORDER — PRAVASTATIN SODIUM 40 MG/1
40 TABLET ORAL
Qty: 90 TAB | Refills: 0 | Status: SHIPPED | OUTPATIENT
Start: 2019-10-29

## 2020-01-24 ENCOUNTER — HOSPITAL ENCOUNTER (OUTPATIENT)
Dept: GENERAL RADIOLOGY | Age: 60
Discharge: HOME | End: 2020-01-24
Payer: MEDICARE

## 2020-01-24 DIAGNOSIS — M25.539 WRIST PAIN: ICD-10-CM

## 2020-01-24 DIAGNOSIS — R52 PAIN: ICD-10-CM

## 2020-01-24 DIAGNOSIS — R05.9 COUGH: ICD-10-CM

## 2020-01-24 PROCEDURE — 73110 X-RAY EXAM OF WRIST: CPT

## 2020-01-24 PROCEDURE — 71046 X-RAY EXAM CHEST 2 VIEWS: CPT

## 2020-01-24 PROCEDURE — 71120 X-RAY EXAM BREASTBONE 2/>VWS: CPT

## 2020-01-31 ENCOUNTER — HOSPITAL ENCOUNTER (OUTPATIENT)
Dept: MAMMOGRAPHY | Age: 60
Discharge: HOME | End: 2020-01-31
Attending: INTERNAL MEDICINE
Payer: MEDICARE

## 2020-01-31 DIAGNOSIS — N63.0 LUMP IN FEMALE BREAST: ICD-10-CM

## 2020-01-31 DIAGNOSIS — Z12.39 BREAST CANCER SCREENING: ICD-10-CM

## 2020-01-31 PROCEDURE — 76642 ULTRASOUND BREAST LIMITED: CPT

## 2020-01-31 PROCEDURE — 77066 DX MAMMO INCL CAD BI: CPT

## 2021-06-09 ENCOUNTER — TELEPHONE (OUTPATIENT)
Dept: CARDIOLOGY CLINIC | Age: 61
End: 2021-06-09

## 2021-06-17 ENCOUNTER — TELEPHONE (OUTPATIENT)
Dept: CARDIOLOGY CLINIC | Age: 61
End: 2021-06-17

## 2021-06-25 ENCOUNTER — TELEPHONE (OUTPATIENT)
Dept: CARDIOLOGY CLINIC | Age: 61
End: 2021-06-25

## 2021-07-30 ENCOUNTER — DOCUMENTATION ONLY (OUTPATIENT)
Dept: CARDIOLOGY CLINIC | Age: 61
End: 2021-07-30

## 2021-10-22 ENCOUNTER — TRANSCRIBE ORDER (OUTPATIENT)
Dept: SCHEDULING | Age: 61
End: 2021-10-22

## 2021-10-22 DIAGNOSIS — R13.10 DYSPHAGIA: Primary | ICD-10-CM

## 2021-11-02 ENCOUNTER — HOSPITAL ENCOUNTER (OUTPATIENT)
Dept: GENERAL RADIOLOGY | Age: 61
Discharge: HOME | End: 2021-11-02
Attending: INTERNAL MEDICINE
Payer: MEDICARE

## 2021-11-02 DIAGNOSIS — R13.10 DYSPHAGIA: ICD-10-CM

## 2021-11-02 PROCEDURE — 74220 X-RAY XM ESOPHAGUS 1CNTRST: CPT

## 2021-12-17 ENCOUNTER — APPOINTMENT (OUTPATIENT)
Dept: GENERAL RADIOLOGY | Age: 61
End: 2021-12-17
Attending: EMERGENCY MEDICINE
Payer: MEDICARE

## 2021-12-17 ENCOUNTER — HOSPITAL ENCOUNTER (EMERGENCY)
Age: 61
Discharge: HOME | End: 2021-12-17
Attending: EMERGENCY MEDICINE
Payer: MEDICARE

## 2021-12-17 VITALS
HEART RATE: 93 BPM | BODY MASS INDEX: 22.2 KG/M2 | WEIGHT: 130 LBS | HEIGHT: 64 IN | OXYGEN SATURATION: 95 % | DIASTOLIC BLOOD PRESSURE: 63 MMHG | RESPIRATION RATE: 15 BRPM | TEMPERATURE: 98.1 F | SYSTOLIC BLOOD PRESSURE: 98 MMHG

## 2021-12-17 DIAGNOSIS — M54.50 ACUTE BILATERAL LOW BACK PAIN, UNSPECIFIED WHETHER SCIATICA PRESENT: Primary | ICD-10-CM

## 2021-12-17 PROCEDURE — 74011250636 HC RX REV CODE- 250/636: Performed by: EMERGENCY MEDICINE

## 2021-12-17 PROCEDURE — 99283 EMERGENCY DEPT VISIT LOW MDM: CPT

## 2021-12-17 PROCEDURE — 96372 THER/PROPH/DIAG INJ SC/IM: CPT

## 2021-12-17 PROCEDURE — 74011250637 HC RX REV CODE- 250/637: Performed by: EMERGENCY MEDICINE

## 2021-12-17 PROCEDURE — 72100 X-RAY EXAM L-S SPINE 2/3 VWS: CPT

## 2021-12-17 RX ORDER — DIAZEPAM 5 MG/1
5 TABLET ORAL
Qty: 12 TABLET | Refills: 0 | Status: SHIPPED | OUTPATIENT
Start: 2021-12-17

## 2021-12-17 RX ORDER — DIAZEPAM 5 MG/1
5 TABLET ORAL
Status: COMPLETED | OUTPATIENT
Start: 2021-12-17 | End: 2021-12-17

## 2021-12-17 RX ORDER — METHYLPREDNISOLONE 4 MG/1
TABLET ORAL
Qty: 1 DOSE PACK | Refills: 0 | Status: SHIPPED | OUTPATIENT
Start: 2021-12-17

## 2021-12-17 RX ORDER — KETOROLAC TROMETHAMINE 30 MG/ML
15 INJECTION, SOLUTION INTRAMUSCULAR; INTRAVENOUS ONCE
Status: COMPLETED | OUTPATIENT
Start: 2021-12-17 | End: 2021-12-17

## 2021-12-17 RX ADMIN — KETOROLAC TROMETHAMINE 15 MG: 30 INJECTION, SOLUTION INTRAMUSCULAR; INTRAVENOUS at 13:07

## 2021-12-17 RX ADMIN — DIAZEPAM 5 MG: 5 TABLET ORAL at 13:07
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
## 2022-03-19 PROBLEM — I50.23 CHF (CONGESTIVE HEART FAILURE), NYHA CLASS IV, ACUTE ON CHRONIC, SYSTOLIC (HCC): Status: ACTIVE | Noted: 2019-05-18

## 2022-03-19 PROBLEM — R07.9 CHEST PAIN: Status: ACTIVE | Noted: 2019-05-18

## 2022-03-19 PROBLEM — I21.4 NSTEMI (NON-ST ELEVATED MYOCARDIAL INFARCTION) (HCC): Status: ACTIVE | Noted: 2019-05-18

## 2022-11-25 NOTE — ED TRIAGE NOTES
Patient ID: Edis Hunt is a 79 y o  male Date of Birth 1954     Chief Complaint  Chief Complaint   Patient presents with   • Follow Up Wound Care Visit     Surgical wound present       Allergies  Codeine and Seasonal ic [cholestatin]    Assessment:    Surgical wound present  The wounds continue to improve  The right lower quadrant wound has filled in and doing very well  The main midline wound is also improving  All the edges have flattened down and uniformity granulation tissue  The left side of the wound still has some disconnect and a portion of the total mesh was removed with scissors  Continue the same care  I did discuss them long-term options and the possibility of skin grafting to help expedite healing  I will refer him to Plastic surgery next month for evaluation and consultation  Diagnoses and all orders for this visit:    Surgical wound present  -     lidocaine (XYLOCAINE) 4 % topical solution 5 mL  -     Wound cleansing and dressings; Future  -     Negative Pressure Wound Therapy    Class 3 severe obesity due to excess calories with serious comorbidity and body mass index (BMI) of 50 0 to 59 9 in [facility])    Other orders  -     Debridement              Debridement   Wound 10/14/22 Surgical Abdomen Medial;Lower    Universal Protocol:  Consent given by: patient  Time out: Immediately prior to procedure a "time out" was called to verify the correct patient, procedure, equipment, support staff and site/side marked as required    Patient identity confirmed: verbally with patient      Performed by: physician  Debridement type: surgical  Level of debridement: subcutaneous tissue  Pain control: lidocaine 4%  Post-debridement measurements  Length (cm): 16 8  Width (cm): 22 1  Depth (cm): 1 9  Percent debrided: 30%  Surface Area (cm^2): 371 28  Area debrided (cm^2): 111 38  Volume (cm^3): 705 43  Tissue and other material debrided: subcutaneous tissue  Devitalized tissue debrided: biofilm Pt reports lower back pain x3 days after wrapping gifts. Pt denies fall. Pt has chronic back issues. and slough  Instrument(s) utilized: curette and scissors  Bleeding: small  Procedural pain (0-10): 1  Post-procedural pain: 1   Response to treatment: procedure was tolerated well          Plan:     Wound 10/14/22 Surgical Abdomen Medial;Lower (Active)   Wound Image Images linked 11/25/22 0942   Wound Description Yellow;Slough;Pink;Granulation tissue 11/25/22 0911   Lis-wound Assessment Scar Tissue 11/25/22 0911   Wound Length (cm) 16 8 cm 11/25/22 0911   Wound Width (cm) 22 1 cm 11/25/22 0911   Wound Depth (cm) 1 8 cm 11/25/22 0911   Wound Surface Area (cm^2) 371 28 cm^2 11/25/22 0911   Wound Volume (cm^3) 668 304 cm^3 11/25/22 0911   Calculated Wound Volume (cm^3) 668  3 cm^3 11/25/22 0911   Change in Wound Size % 70 59 11/25/22 0911   Undermining 0 9 11/25/22 0911   Undermining is depth extending from 2-4 o'clock 11/25/22 0911   Drainage Amount Copious 11/25/22 0911   Patient Tolerance Tolerated well 11/25/22 0911   Dressing Status Removed 11/25/22 0911       Wound 10/14/22 Surgical Abdomen Right; Lower (Active)   Wound Image Images linked 11/25/22 0910   Wound Description Yellow;Pink;Granulation tissue; Epithelialization;Slough 11/25/22 0913   Lis-wound Assessment Scar Tissue 11/25/22 0913   Wound Length (cm) 3 5 cm 11/25/22 0913   Wound Width (cm) 2 3 cm 11/25/22 0913   Wound Depth (cm) 0 3 cm 11/25/22 0913   Wound Surface Area (cm^2) 8 05 cm^2 11/25/22 0913   Wound Volume (cm^3) 2 415 cm^3 11/25/22 0913   Calculated Wound Volume (cm^3) 2 42 cm^3 11/25/22 0913   Change in Wound Size % 98 62 11/25/22 0913   Drainage Amount Copious 11/25/22 0913   Patient Tolerance Tolerated well 11/25/22 0913   Dressing Status Removed 11/25/22 0913       Wound 10/14/22 Surgical Abdomen Medial;Lower (Active)   Date First Assessed/Time First Assessed: 10/14/22 0828   Primary Wound Type: Surgical  Location: Abdomen  Wound Location Orientation: Medial;Lower       Wound 10/14/22 Surgical Abdomen Right; Lower (Active)   Date First Assessed/Time First Assessed: 10/14/22 0829   Primary Wound Type: Surgical  Location: Abdomen  Wound Location Orientation: Right; Lower       [REMOVED] Wound 07/15/22 Other (comment) Abdomen Right; Lower (Removed)   Resolved Date/Resolved Time: 10/14/22 0827  Date First Assessed/Time First Assessed: 07/15/22 1401   Primary Wound Type: Other (comment)  Location: Abdomen  Wound Location Orientation: Right; Lower  Wound Outcome: Other (Comment)       [REMOVED] Wound 09/15/22 Abdomen N/A (Removed)   Resolved Date/Resolved Time: 10/14/22 0826  Date First Assessed/Time First Assessed: 09/15/22 1359   Location: Abdomen  Wound Location Orientation: N/A  Wound Description (Comments): vac dressing-RLQ (1 black sponge, 1 white sponge), midline- surgical  [REMOVED] Wound 09/15/22 Abdomen Lower;Quadrant;Right (Removed)   Resolved Date/Resolved Time: 10/14/22 0827  Date First Assessed: 09/15/22   Location: Abdomen  Wound Location Orientation: Lower;Quadrant;Right  Wound Outcome: Other (Comment)       [REMOVED] Wound 10/11/22 Abdomen N/A (Removed)   Resolved Date/Resolved Time: 10/14/22 0827  Date First Assessed/Time First Assessed: 10/11/22 1545   Location: Abdomen  Wound Location Orientation: N/A  Wound Description (Comments): wound vac 1 incision black sponge 1 incision black sponge one black     [REMOVED] Wound 11/15/22 Penis Right (Removed)   Resolved Date/Resolved Time: 11/19/22 5754  Date First Assessed/Time First Assessed: 11/15/22 2250   Location: Penis  Wound Location Orientation: Right  Wound Outcome: Healed       Subjective:        Mr Reese Gutierrez is a 72-year-old gentleman here for follow-up from hospitalization for large abdominal wound  He he developed an incarcerated right lower quadrant hernia requiring excision debridement of the wound and bridging closure with absorbable mesh  He had a large retention sutures in the abdominal wall but developed necrosis of the abdominal wall    He was readmitted underwent excisional debridement and is here for evaluation  10/21/2022  Overall doing okay  No significant pain  Having some issues with the VAC dressing but overall working    11/04/2022  Doing well  Trying to increase his protein  Still having significant fatigue episodes  Still going through canisters quickly    11/25/2022  He was hospitalized last week for severe sepsis and hydronephrosis and acute kidney injury from an obstructing ureteral stone and underwent cystoscopy and stent placement  He is at home now and doing well  No other issues      The following portions of the patient's history were reviewed and updated as appropriate: allergies, current medications, past family history, past medical history, past social history, past surgical history and problem list     Review of Systems   Constitutional: Negative for chills and fever  HENT: Negative for ear pain and sore throat  Eyes: Negative for pain and visual disturbance  Respiratory: Negative for cough and shortness of breath  Cardiovascular: Negative for chest pain and palpitations  Gastrointestinal: Negative for abdominal pain and vomiting  Musculoskeletal: Positive for back pain and gait problem  Negative for arthralgias  Skin: Negative for color change and rash  Neurological: Negative for seizures and syncope  Psychiatric/Behavioral: Negative for agitation and behavioral problems  All other systems reviewed and are negative          Objective:       Wound 10/14/22 Surgical Abdomen Medial;Lower (Active)   Wound Image Images linked 11/25/22 0942   Wound Description Yellow;Slough;Pink;Granulation tissue 11/25/22 0911   Lis-wound Assessment Scar Tissue 11/25/22 0911   Wound Length (cm) 16 8 cm 11/25/22 0911   Wound Width (cm) 22 1 cm 11/25/22 0911   Wound Depth (cm) 1 8 cm 11/25/22 0911   Wound Surface Area (cm^2) 371 28 cm^2 11/25/22 0911   Wound Volume (cm^3) 668 304 cm^3 11/25/22 0911   Calculated Wound Volume (cm^3) 668 3 cm^3 11/25/22 0911   Change in Wound Size % 70 59 11/25/22 0911   Undermining 0 9 11/25/22 0911   Undermining is depth extending from 2-4 o'clock 11/25/22 0911   Drainage Amount Copious 11/25/22 0911   Patient Tolerance Tolerated well 11/25/22 0911   Dressing Status Removed 11/25/22 0911       Wound 10/14/22 Surgical Abdomen Right; Lower (Active)   Wound Image Images linked 11/25/22 0910   Wound Description Yellow;Pink;Granulation tissue; Epithelialization;Slough 11/25/22 0913   Lis-wound Assessment Scar Tissue 11/25/22 0913   Wound Length (cm) 3 5 cm 11/25/22 0913   Wound Width (cm) 2 3 cm 11/25/22 0913   Wound Depth (cm) 0 3 cm 11/25/22 0913   Wound Surface Area (cm^2) 8 05 cm^2 11/25/22 0913   Wound Volume (cm^3) 2 415 cm^3 11/25/22 0913   Calculated Wound Volume (cm^3) 2 42 cm^3 11/25/22 0913   Change in Wound Size % 98 62 11/25/22 0913   Drainage Amount Copious 11/25/22 0913   Patient Tolerance Tolerated well 11/25/22 0913   Dressing Status Removed 11/25/22 0913       /82   Pulse 64   Temp 98 8 °F (37 1 °C)   Resp 18     Physical Exam  Vitals and nursing note reviewed  Constitutional:       General: He is not in acute distress  Appearance: He is well-developed  He is obese  He is not diaphoretic  HENT:      Head: Normocephalic and atraumatic  Eyes:      Pupils: Pupils are equal, round, and reactive to light  Cardiovascular:      Rate and Rhythm: Normal rate and regular rhythm  Pulmonary:      Effort: Pulmonary effort is normal  No respiratory distress  Breath sounds: Normal breath sounds  Abdominal:      Palpations: Abdomen is soft  Comments: Obese abdomen  See complete wound assessment for measurements of abdominal wounds   Musculoskeletal:         General: Normal range of motion  Cervical back: Normal range of motion and neck supple  Skin:     General: Skin is warm and dry  Neurological:      Mental Status: He is alert and oriented to person, place, and time  Psychiatric:         Behavior: Behavior normal            Wound Instructions:  Orders Placed This Encounter   Procedures   • Wound cleansing and dressings     Wound cleansing and dressings          Wash your hands with soap and water  Remove old dressing, discard into plastic bag and place in trash  Cleanse the wound with normal saline solution prior to applying a clean dressing  Do not use tissue or cotton balls  Do not scrub the wound  Pat dry using gauze  Shower YES,on days your dressing gets changed  DO NOT USE SKIN PROTECTANT TO SKIN UNDER DERMATAC  Apply black granufoam to the both wounds bridging dressing  DO NOT ALLOW SPONGE TO TOUCH THE SKIN    Cover with dermatac vac drape  May use regular drape over sponge and dermatac  Settings at 125mmHg continuous  Change dressing 3 times per week      Silver alginate to cover satelite open areas to upper abdomen     No lifting more than 20lbs  Consume 3-4 servings of protein daily  Continue visiting nurses skilled 3 times per week for wound care dressing changes  May skip days patient goes to Wound Care appointment     Follow up with Wound Management in 2 weeks  Plastics consult with Dr Shankar Avendano on 12/22/22 at [address] [place] [address]  Cleansed with NSS and redressed as ordered above     Standing Status:   Future     Standing Expiration Date:   11/25/2023   • Negative Pressure Wound Therapy     This order was created via procedure documentation   • Debridement     This order was created via procedure documentation        Diagnosis ICD-10-CM Associated Orders   1  Surgical wound present  T14  8XXA lidocaine (XYLOCAINE) 4 % topical solution 5 mL     Wound cleansing and dressings     Negative Pressure Wound Therapy      2   Class 3 severe obesity due to excess calories with serious comorbidity and body mass index (BMI) of 50 0 to 59 9 in adult [facility])  E66 01     Z68 43

## 2022-12-14 NOTE — DIABETES MGMT
DTC Consult Note    Recommendations/ Comments: Blood sugars improving with steroid taper    Current hospital DM medication: Lispro correction, resistant scale, Lantus 8 units being added while patient still on steroids    Consult received for:  [x]             Assessment of home management    Chart reviewed and initial evaluation complete on [facility]. Patient is a 66 y.o. female with a history of gestational diabetes, newly diagnosed with pre-diabetes. She reports that she has been drinking large amounts of sweet tea and fruit juices, but is willing to eliminate these. Encouraged her to follow up with PCP in 3 months for a repeat A1c. A1C done one day after steroids began. Assessed and instructed patient on the following:   ·  interpretation of lab results, blood sugar goals, complications of diabetes mellitus and nutrition    Encouraged the following:   · dietary modifications: eliminate sweet drinks    Provided patient with the following: [x]           Pre-Diabetes Guide               [x]             Outpatient DTC contact number    Discussed with patient and/or family need for follow up appointment for diabetes management after discharge. A1c:   Lab Results   Component Value Date/Time    Hemoglobin A1c 6.4 (H) 05/19/2019 08:11 AM       Recent Glucose Results:   Lab Results   Component Value Date/Time     (H) 05/27/2019 04:34 AM    GLUCPOC 187 (H) 05/27/2019 12:20 PM    GLUCPOC 161 (H) 05/27/2019 07:25 AM    GLUCPOC 213 (H) 05/26/2019 09:46 PM        Lab Results   Component Value Date/Time    Creatinine 1.02 05/27/2019 04:34 AM     Estimated Creatinine Clearance: 53.2 mL/min (based on SCr of 1.02 mg/dL). Active Orders   Diet    DIET CARDIAC Regular; Consistent Carb 1500-1600kcal; No Conc.  Sweets        PO intake:   Patient Vitals for the past 72 hrs:   % Diet Eaten   05/27/19 1330 100 %   05/27/19 0900 100 %   05/26/19 1800 100 %   05/26/19 1200 90 %   05/26/19 1000 95 %   05/25/19 1800 90 %   05/25/19 1300 90 %   05/25/19 0800 95 %   05/24/19 1600 75 %       Will continue to follow as needed. Thank you.   Dalia Sanchez MS, RN, CDE    Time spent: 20 minutes Photo Preface (Leave Blank If You Do Not Want): Photographs were obtained today Detail Level: Detailed

## 2023-05-12 RX ORDER — ALBUTEROL SULFATE 90 UG/1
2 AEROSOL, METERED RESPIRATORY (INHALATION) EVERY 4 HOURS PRN
COMMUNITY
Start: 2019-06-04

## 2023-05-16 RX ORDER — GABAPENTIN 300 MG/1
CAPSULE ORAL 4 TIMES DAILY
COMMUNITY

## 2023-05-16 RX ORDER — NAPROXEN 500 MG/1
TABLET ORAL
COMMUNITY
Start: 2019-08-01

## 2023-05-16 RX ORDER — PANTOPRAZOLE SODIUM 40 MG/1
TABLET, DELAYED RELEASE ORAL
COMMUNITY
Start: 2019-07-31

## 2023-05-16 RX ORDER — HYDROCORTISONE 10 MG/1
TABLET ORAL
COMMUNITY
Start: 2020-07-29

## 2023-05-16 RX ORDER — LIDOCAINE 50 MG/G
PATCH TOPICAL
COMMUNITY
Start: 2019-08-01

## 2023-05-16 RX ORDER — CLONAZEPAM 0.5 MG/1
TABLET ORAL
COMMUNITY

## 2023-05-16 RX ORDER — METHYLPREDNISOLONE 4 MG/1
TABLET ORAL
COMMUNITY
Start: 2021-12-17

## 2023-05-16 RX ORDER — MAGNESIUM OXIDE 400 MG/1
TABLET ORAL DAILY
COMMUNITY
Start: 2019-06-05

## 2023-05-16 RX ORDER — AMITRIPTYLINE HYDROCHLORIDE 75 MG/1
TABLET, FILM COATED ORAL
COMMUNITY

## 2023-05-16 RX ORDER — PRAVASTATIN SODIUM 40 MG
TABLET ORAL
COMMUNITY
Start: 2019-10-29

## 2023-05-16 RX ORDER — DIAZEPAM 5 MG/1
TABLET ORAL EVERY 8 HOURS PRN
COMMUNITY
Start: 2021-12-17

## 2023-05-16 RX ORDER — ASPIRIN 81 MG/1
TABLET ORAL DAILY
COMMUNITY
Start: 2019-06-05

## 2023-08-21 ENCOUNTER — TRANSCRIBE ORDERS (OUTPATIENT)
Facility: HOSPITAL | Age: 63
End: 2023-08-21

## 2023-08-21 DIAGNOSIS — R19.8 PULSATILE ABDOMEN: ICD-10-CM

## 2023-08-21 DIAGNOSIS — R10.0 ACUTE ABDOMINAL PAIN SYNDROME: Primary | ICD-10-CM

## 2023-08-21 DIAGNOSIS — R13.10 DYSPHAGIA, UNSPECIFIED TYPE: Primary | ICD-10-CM

## 2023-08-29 ENCOUNTER — HOSPITAL ENCOUNTER (OUTPATIENT)
Facility: HOSPITAL | Age: 63
Discharge: HOME | End: 2023-09-01
Payer: MEDICAID

## 2023-08-29 DIAGNOSIS — R13.10 DYSPHAGIA, UNSPECIFIED TYPE: ICD-10-CM

## 2023-08-29 DIAGNOSIS — R10.0 ACUTE ABDOMINAL PAIN SYNDROME: ICD-10-CM

## 2023-08-29 DIAGNOSIS — R19.8 PULSATILE ABDOMEN: ICD-10-CM

## 2023-08-29 PROCEDURE — 76700 US EXAM ABDOM COMPLETE: CPT

## 2023-08-29 PROCEDURE — 74220 X-RAY XM ESOPHAGUS 1CNTRST: CPT

## 2024-09-16 ENCOUNTER — TRANSCRIBE ORDERS (OUTPATIENT)
Facility: HOSPITAL | Age: 64
End: 2024-09-16

## 2024-09-16 ENCOUNTER — HOSPITAL ENCOUNTER (OUTPATIENT)
Facility: HOSPITAL | Age: 64
Discharge: HOME | End: 2024-09-19
Payer: MEDICARE

## 2024-09-16 DIAGNOSIS — M54.2 NECK PAIN: ICD-10-CM

## 2024-09-16 DIAGNOSIS — M54.2 NECK PAIN: Primary | ICD-10-CM

## 2024-09-16 PROCEDURE — 72050 X-RAY EXAM NECK SPINE 4/5VWS: CPT

## 2025-01-05 ENCOUNTER — APPOINTMENT (OUTPATIENT)
Facility: HOSPITAL | Age: 65
End: 2025-01-05
Payer: MEDICAID

## 2025-01-05 ENCOUNTER — HOSPITAL ENCOUNTER (EMERGENCY)
Facility: HOSPITAL | Age: 65
Discharge: HOME | End: 2025-01-05
Attending: STUDENT IN AN ORGANIZED HEALTH CARE EDUCATION/TRAINING PROGRAM
Payer: MEDICAID

## 2025-01-05 VITALS
BODY MASS INDEX: 18.59 KG/M2 | SYSTOLIC BLOOD PRESSURE: 132 MMHG | HEIGHT: 64 IN | DIASTOLIC BLOOD PRESSURE: 80 MMHG | TEMPERATURE: 98.7 F | RESPIRATION RATE: 29 BRPM | OXYGEN SATURATION: 94 % | WEIGHT: 108.91 LBS | HEART RATE: 92 BPM

## 2025-01-05 DIAGNOSIS — G51.0 BELL'S PALSY: Primary | ICD-10-CM

## 2025-01-05 DIAGNOSIS — E83.42 HYPOMAGNESEMIA: ICD-10-CM

## 2025-01-05 LAB
ALBUMIN SERPL-MCNC: 3.5 G/DL (ref 3.5–5)
ALBUMIN/GLOB SERPL: 1 (ref 1.1–2.2)
ALP SERPL-CCNC: 138 U/L (ref 45–117)
ALT SERPL-CCNC: 23 U/L (ref 12–78)
ANION GAP SERPL CALC-SCNC: 6 MMOL/L (ref 2–12)
AST SERPL-CCNC: 18 U/L (ref 15–37)
BASOPHILS # BLD: 0.1 K/UL (ref 0–0.1)
BASOPHILS NFR BLD: 1 % (ref 0–1)
BILIRUB SERPL-MCNC: 0.2 MG/DL (ref 0.2–1)
BUN SERPL-MCNC: 20 MG/DL (ref 6–20)
BUN/CREAT SERPL: 14 (ref 12–20)
CALCIUM SERPL-MCNC: 8.9 MG/DL (ref 8.5–10.1)
CHLORIDE SERPL-SCNC: 104 MMOL/L (ref 97–108)
CO2 SERPL-SCNC: 29 MMOL/L (ref 21–32)
COMMENT:: NORMAL
CREAT SERPL-MCNC: 1.38 MG/DL (ref 0.55–1.02)
DIFFERENTIAL METHOD BLD: ABNORMAL
EOSINOPHIL # BLD: 0.3 K/UL (ref 0–0.4)
EOSINOPHIL NFR BLD: 3 % (ref 0–7)
ERYTHROCYTE [DISTWIDTH] IN BLOOD BY AUTOMATED COUNT: 12.8 % (ref 11.5–14.5)
FLUAV RNA SPEC QL NAA+PROBE: NOT DETECTED
FLUBV RNA SPEC QL NAA+PROBE: NOT DETECTED
GLOBULIN SER CALC-MCNC: 3.6 G/DL (ref 2–4)
GLUCOSE SERPL-MCNC: 154 MG/DL (ref 65–100)
HCT VFR BLD AUTO: 42.2 % (ref 35–47)
HGB BLD-MCNC: 13.9 G/DL (ref 11.5–16)
IMM GRANULOCYTES # BLD AUTO: 0 K/UL (ref 0–0.04)
IMM GRANULOCYTES NFR BLD AUTO: 0 % (ref 0–0.5)
LYMPHOCYTES # BLD: 1.8 K/UL (ref 0.8–3.5)
LYMPHOCYTES NFR BLD: 16 % (ref 12–49)
MAGNESIUM SERPL-MCNC: 1.4 MG/DL (ref 1.6–2.4)
MCH RBC QN AUTO: 30.2 PG (ref 26–34)
MCHC RBC AUTO-ENTMCNC: 32.9 G/DL (ref 30–36.5)
MCV RBC AUTO: 91.7 FL (ref 80–99)
MONOCYTES # BLD: 0.4 K/UL (ref 0–1)
MONOCYTES NFR BLD: 4 % (ref 5–13)
NEUTS SEG # BLD: 8.6 K/UL (ref 1.8–8)
NEUTS SEG NFR BLD: 76 % (ref 32–75)
NRBC # BLD: 0 K/UL (ref 0–0.01)
NRBC BLD-RTO: 0 PER 100 WBC
PLATELET # BLD AUTO: 354 K/UL (ref 150–400)
PMV BLD AUTO: 9.8 FL (ref 8.9–12.9)
POTASSIUM SERPL-SCNC: 3.9 MMOL/L (ref 3.5–5.1)
PROT SERPL-MCNC: 7.1 G/DL (ref 6.4–8.2)
RBC # BLD AUTO: 4.6 M/UL (ref 3.8–5.2)
SARS-COV-2 RNA RESP QL NAA+PROBE: NOT DETECTED
SODIUM SERPL-SCNC: 139 MMOL/L (ref 136–145)
SOURCE: NORMAL
SPECIMEN HOLD: NORMAL
TROPONIN I SERPL HS-MCNC: 6 NG/L (ref 0–51)
TSH SERPL DL<=0.05 MIU/L-ACNC: 0.28 UIU/ML (ref 0.36–3.74)
WBC # BLD AUTO: 11.3 K/UL (ref 3.6–11)

## 2025-01-05 PROCEDURE — 84443 ASSAY THYROID STIM HORMONE: CPT

## 2025-01-05 PROCEDURE — 6360000002 HC RX W HCPCS: Performed by: STUDENT IN AN ORGANIZED HEALTH CARE EDUCATION/TRAINING PROGRAM

## 2025-01-05 PROCEDURE — 96365 THER/PROPH/DIAG IV INF INIT: CPT

## 2025-01-05 PROCEDURE — 87636 SARSCOV2 & INF A&B AMP PRB: CPT

## 2025-01-05 PROCEDURE — 99285 EMERGENCY DEPT VISIT HI MDM: CPT

## 2025-01-05 PROCEDURE — 80053 COMPREHEN METABOLIC PANEL: CPT

## 2025-01-05 PROCEDURE — 84484 ASSAY OF TROPONIN QUANT: CPT

## 2025-01-05 PROCEDURE — 93005 ELECTROCARDIOGRAM TRACING: CPT | Performed by: STUDENT IN AN ORGANIZED HEALTH CARE EDUCATION/TRAINING PROGRAM

## 2025-01-05 PROCEDURE — 85025 COMPLETE CBC W/AUTO DIFF WBC: CPT

## 2025-01-05 PROCEDURE — 83735 ASSAY OF MAGNESIUM: CPT

## 2025-01-05 PROCEDURE — 36415 COLL VENOUS BLD VENIPUNCTURE: CPT

## 2025-01-05 PROCEDURE — 70450 CT HEAD/BRAIN W/O DYE: CPT

## 2025-01-05 PROCEDURE — 71046 X-RAY EXAM CHEST 2 VIEWS: CPT

## 2025-01-05 RX ORDER — VALACYCLOVIR HYDROCHLORIDE 1 G/1
1000 TABLET, FILM COATED ORAL 3 TIMES DAILY
Qty: 21 TABLET | Refills: 0 | Status: SHIPPED | OUTPATIENT
Start: 2025-01-05 | End: 2025-01-05

## 2025-01-05 RX ORDER — PREDNISONE 50 MG/1
50 TABLET ORAL DAILY
Qty: 7 TABLET | Refills: 0 | Status: SHIPPED | OUTPATIENT
Start: 2025-01-05 | End: 2025-01-12

## 2025-01-05 RX ORDER — VALACYCLOVIR HYDROCHLORIDE 1 G/1
1000 TABLET, FILM COATED ORAL 3 TIMES DAILY
Qty: 21 TABLET | Refills: 0 | Status: SHIPPED | OUTPATIENT
Start: 2025-01-05 | End: 2025-01-12

## 2025-01-05 RX ORDER — PREDNISONE 50 MG/1
50 TABLET ORAL DAILY
Qty: 7 TABLET | Refills: 0 | Status: SHIPPED | OUTPATIENT
Start: 2025-01-05 | End: 2025-01-05

## 2025-01-05 RX ORDER — MAGNESIUM SULFATE 1 G/100ML
1000 INJECTION INTRAVENOUS ONCE
Status: COMPLETED | OUTPATIENT
Start: 2025-01-05 | End: 2025-01-05

## 2025-01-05 RX ADMIN — MAGNESIUM SULFATE HEPTAHYDRATE 1000 MG: 1 INJECTION, SOLUTION INTRAVENOUS at 20:24

## 2025-01-05 ASSESSMENT — LIFESTYLE VARIABLES
HOW MANY STANDARD DRINKS CONTAINING ALCOHOL DO YOU HAVE ON A TYPICAL DAY: PATIENT DOES NOT DRINK
HOW OFTEN DO YOU HAVE A DRINK CONTAINING ALCOHOL: NEVER

## 2025-01-05 ASSESSMENT — PAIN SCALES - GENERAL: PAINLEVEL_OUTOF10: 0

## 2025-01-05 NOTE — ED TRIAGE NOTES
Pt arrives with EMS from her daughters house    Pt states 2 days ago she noted facial droop to right side of face. Yesterday morning, pt woke up with left sided facial droop and slurred speech. Pts forehead, eyebrow, and eyelid on left side drooping.    Hx stroke 2010. Recent admit to [facility] for hyperglycemia.

## 2025-01-05 NOTE — ED PROVIDER NOTES
[facility] EMERGENCY DEP  EMERGENCY DEPARTMENT ENCOUNTER      Pt Name: Georgia Vasquez  MRN: [id number]  Birthdate 1960  Date of evaluation: 1/5/2025  Provider: John Gaytan DO    CHIEF COMPLAINT       Chief Complaint   Patient presents with    Facial Droop         HISTORY OF PRESENT ILLNESS   (Location/Symptom, Timing/Onset, Context/Setting, Quality, Duration, Modifying Factors, Severity)  Note limiting factors.   64-year-old female presents ED for evaluation of left-sided facial droop.  Patient states that 2 days ago she started having a little bit of droop to her left side of her face and has progressively worsened throughout this time.  Involves her eyebrow her eyelid her forehead and her left face.  She denies any chest pain or shortness of breath, was recently admitted for hypoglycemia.  History of previous troponin 2010.  She denies any extremity weakness or numbness vision changes or hearing changes.            Review of External Medical Records:     Nursing Notes were reviewed.    REVIEW OF SYSTEMS    (2-9 systems for level 4, 10 or more for level 5)     Review of Systems   Constitutional:  Negative for fever.   Eyes:  Negative for visual disturbance.   Respiratory:  Negative for shortness of breath.    Cardiovascular:  Negative for chest pain.   Gastrointestinal:  Negative for abdominal pain.   Neurological:  Positive for facial asymmetry. Negative for weakness, numbness and headaches.       Except as noted above the remainder of the review of systems was reviewed and negative.       PAST MEDICAL HISTORY     Past Medical History:   Diagnosis Date    Adult disease 1994    gestational diabetes    Arthritis     lower back, hands    Asthma     Cancer (HCC) 1980    cervical dysplagia conization    Chronic kidney disease     hx uti    Chronic pain     hx back problems    DJD (degenerative joint disease)     Fibromyalgia     GERD (gastroesophageal reflux disease)     gastritis    Herniated cervical disc     SpO2: 96% 97% 94% 96%   Weight:       Height:               Medical Decision Making  64-year-old female with complete left-sided facial paralysis.  Differential includes but not limited to Bell's palsy, stroke.  Patient well-appearing exam no acute distress has had progressively left-sided facial paralysis.  CT head without this normal patient has chronic kidney disease, her workup included labs and imaging which were remarkable for mild hypomagnesemia which was replaced.  I offered admission further testing MRI.  Patient would like to go home refused admission.  Have started her on antivirals and steroids.  Discussed about following up with her PCP, discharged in ED no acute distress nontoxic appearance.    Amount and/or Complexity of Data Reviewed  Labs: ordered.  Radiology: ordered.  ECG/medicine tests: ordered.    Risk  Prescription drug management.            REASSESSMENT     ED Course as of 01/05/25 2101   Sun Jan 05, 2025   1853 ECG performed at 1849 shows normal sinus rhythm, right bundle branch block, ventricular rate of 92, left anterior fascicular block no STEMI [WG]      ED Course User Index  [WG] John Gaytan DO           CONSULTS:  None    PROCEDURES:  Unless otherwise noted below, none     Procedures      FINAL IMPRESSION      1. Bell's palsy    2. Hypomagnesemia          DISPOSITION/PLAN   DISPOSITION Discharge - Pending Orders Complete 01/05/2025 08:44:55 PM      PATIENT REFERRED TO:  Lio Doan MD  [address] [place] [address]  [phone]            DISCHARGE MEDICATIONS:  New Prescriptions    PREDNISONE (DELTASONE) 50 MG TABLET    Take 1 tablet by mouth daily for 7 days    VALACYCLOVIR (VALTREX) 1 G TABLET    Take 1 tablet by mouth 3 times daily for 7 days         (Please note that portions of this note were completed with a voice recognition program.  Efforts were made to edit the dictations but occasionally words are mis-transcribed.)    John Gaytan,

## 2025-01-06 LAB
EKG ATRIAL RATE: 92 BPM
EKG DIAGNOSIS: NORMAL
EKG P AXIS: 81 DEGREES
EKG P-R INTERVAL: 128 MS
EKG Q-T INTERVAL: 394 MS
EKG QRS DURATION: 124 MS
EKG QTC CALCULATION (BAZETT): 487 MS
EKG R AXIS: -60 DEGREES
EKG T AXIS: 10 DEGREES
EKG VENTRICULAR RATE: 92 BPM

## 2025-01-06 PROCEDURE — 93010 ELECTROCARDIOGRAM REPORT: CPT | Performed by: INTERNAL MEDICINE

## 2025-01-06 NOTE — DISCHARGE INSTRUCTIONS
Please take the antiviral medications as prescribed, also take the steroids that you are prescribed, follow-up primary care doctor soon as possible.  You should use lubricating eyedrops that she can buy over-the-counter for your left eye and keep this covered at night.  Also talk to your doctor about your low magnesium level.  Return as needed or if worsening condition.

## 2025-01-06 NOTE — ED NOTES
Patient discharged by Lukas DO - pt sent to the front lobby, with strong and steady gait, no acute distress noted at time of discharge - Discharge information / home RX / and reasons to return to the ED were reviewed by the ED provider.

## (undated) DEVICE — PACK PROCEDURE SURG HRT CATH

## (undated) DEVICE — (D)PREP SKN CHLRAPRP APPL 26ML -- CONVERT TO ITEM 371833

## (undated) DEVICE — AGENT HEMSTAT W4XL4IN OXIDIZED REGENERATED CELOS ABSRB SFT

## (undated) DEVICE — COVER,TABLE,HEAVY DUTY,60"X90",STRL: Brand: MEDLINE

## (undated) DEVICE — SYR LR LCK 1ML GRAD NSAF 30ML --

## (undated) DEVICE — 3M™ TEGADERM™ TRANSPARENT FILM DRESSING FRAME STYLE, 1626W, 4 IN X 4-3/4 IN (10 CM X 12 CM), 50/CT 4CT/CASE: Brand: 3M™ TEGADERM™

## (undated) DEVICE — SOLUTION IV 500ML 0.9% SOD CHL FLX CONT

## (undated) DEVICE — SUTURE PROL SZ 6-0 L30IN NONABSORBABLE BLU L13MM RB-2 1/2 8711H

## (undated) DEVICE — CATH ANGI 5F STRD145DPTAIL 110 -- SITESEER

## (undated) DEVICE — TOWEL SURG W17XL27IN STD BLU COT NONFENESTRATED PREWASHED

## (undated) DEVICE — DRESSING FOAM W7 3 10XL95IN SIL POLYUR HEEL SELF ADH W BORD

## (undated) DEVICE — STERILE POLYISOPRENE POWDER-FREE SURGICAL GLOVES WITH EMOLLIENT COATING: Brand: PROTEXIS

## (undated) DEVICE — SHEET, T, LAPAROTOMY, STERILE: Brand: MEDLINE

## (undated) DEVICE — STERILE POLYISOPRENE POWDER-FREE SURGICAL GLOVES: Brand: PROTEXIS

## (undated) DEVICE — RELOAD STPL H1-2.5XL35MM VASC THN TISS WHT B FRM NAT ARTC

## (undated) DEVICE — LUB SURG MEDC STRL 2OZ TUBE MC -- MEDICHOICE

## (undated) DEVICE — COVER LT HNDL PLAS RIG 1 PER PK

## (undated) DEVICE — SUTURE PROL SZ 5-0 L30IN NONABSORBABLE BLU L13MM RB-2 1/2 8710H

## (undated) DEVICE — GLIDESHEATH SLENDER ACCESS KIT: Brand: GLIDESHEATH SLENDER

## (undated) DEVICE — DRAPE FLD WRM W44XL66IN C6L FOR INTRATEMP SYS THERMABASIN

## (undated) DEVICE — Z CONVERTED USE 2271365 TRAY SKIN W3XL3IN S STL STPL REMV GZ PD DISP MEDI PK

## (undated) DEVICE — SUTURE SZ 0 27IN 5/8 CIR UR-6  TAPER PT VIOLET ABSRB VICRYL J603H

## (undated) DEVICE — ANGIOGRAPHY KIT

## (undated) DEVICE — PAD,ABDOMINAL,5"X9",ST,LF,25/BX: Brand: MEDLINE INDUSTRIES, INC.

## (undated) DEVICE — FOGARTY ARTERIAL EMBOLECTOMY CATHETER 4F 80CM: Brand: FOGARTY

## (undated) DEVICE — SYR 50ML LR LCK 1ML GRAD NSAF --

## (undated) DEVICE — GOWN,SIRUS,NONRNF,SETINSLV,XL,20/CS: Brand: MEDLINE

## (undated) DEVICE — SPONGE GZ W4XL4IN COT 12 PLY TYP VII WVN C FLD DSGN

## (undated) DEVICE — CLIP INT SM WIDE RED TI TRNSVRS GRV CHEVRON SHP W PRECIS

## (undated) DEVICE — INFECTION CONTROL KIT SYS

## (undated) DEVICE — Z CONVERTED USE 2107985 COVER FLROSCP W36XL28IN 4 SIDE ADH

## (undated) DEVICE — CV INCISE SHEET: Brand: CONVERTORS

## (undated) DEVICE — SWAN-GANZ CCOMBO THERMODILUTION CATHETER: Brand: SWAN-GANZ CCOMBO

## (undated) DEVICE — DRESSING AQUACEL ADVANTAGE ALG W4XL5IN RECT GREATER ABSRB HYDRFBR TECHNOLOGY

## (undated) DEVICE — INTENDED TO STANDARDIZE OR CAMERAS TO ALLOW FOR THE USE OF THE OR CAMERA COVER: Brand: ASPEN® O.R. CAMERA COVER

## (undated) DEVICE — PUMP HEART IMPELLA CP03 --

## (undated) DEVICE — STRAP POS KNEE BODY VELC

## (undated) DEVICE — SOLUTION IV 1000ML 0.9% SOD CHL

## (undated) DEVICE — SOLUTION IRRIG 1000ML H2O STRL BLT

## (undated) DEVICE — CATHETERIZATION KIT 9 FRX115 CM CV DL ARROWG+ARD +

## (undated) DEVICE — REM POLYHESIVE ADULT PATIENT RETURN ELECTRODE: Brand: VALLEYLAB

## (undated) DEVICE — Device

## (undated) DEVICE — DRAPE,REIN 53X77,STERILE: Brand: MEDLINE

## (undated) DEVICE — PLEDGET SURG W3/16XL0.25IN THK1.65MM PTFE OVL FELT FOR THE

## (undated) DEVICE — BAG PRESSURE INFUSION 1000ML -- CONVERT TO ITEM 360122

## (undated) DEVICE — AEGIS 1" DISK 4MM HOLE, PEEL OPEN: Brand: MEDLINE

## (undated) DEVICE — SUTURE VCRL SZ 0 L18IN ABSRB VLT L40MM CT 1/2 CIR J752D

## (undated) DEVICE — NEEDLE HYPO 25GA L1.5IN BVL ORIENTED ECLIPSE

## (undated) DEVICE — KIT MFLD ISOLATN NACL CNTRST PRT TBNG SPIK W/ PRSS TRNSDUC

## (undated) DEVICE — ZINACTIVE USE 2641837 CLIP LIG M BLU TI HRT SHP WIRE HORZ 600 PER BX

## (undated) DEVICE — TIDISHIELD TRANSPORT, CONTAINMENT COVER FOR BACK TABLE 4'6" (1.37M) TO 8' (2.43M) IN LENGTH: Brand: TIDISHIELD

## (undated) DEVICE — TR BAND RADIAL ARTERY COMPRESSION DEVICE: Brand: TR BAND

## (undated) DEVICE — SURGICAL PROCEDURE PACK BASIN MAJ SET CUST NO CAUT

## (undated) DEVICE — CATH DIAG AL2 IMPLS 6FRX100CM -- IMPULSE 16599-98

## (undated) DEVICE — GUIDEWIRE VASC L150CM DIA0.035IN FLX END L7CM J 3MM PTFE

## (undated) DEVICE — SYR 10ML LUER LOK 1/5ML GRAD --

## (undated) DEVICE — SLIM BODY SKIN STAPLER: Brand: APPOSE ULC

## (undated) DEVICE — SUT SLK 2 60IN TIE MP BLK --

## (undated) DEVICE — 1200 GUARD II KIT W/5MM TUBE W/O VAC TUBE: Brand: GUARDIAN

## (undated) DEVICE — STAPLER SKIN L320MM 35MM VASC TISS 12 FIRING B FRM PWR

## (undated) DEVICE — ROCKER SWITCH PENCIL BLADE ELECTRODE, HOLSTER: Brand: EDGE

## (undated) DEVICE — SUTURE PERMA-HAND 0 L18IN NONABSORBABLE BLK CT-1 L36MM 1/2 C021D

## (undated) DEVICE — GUIDEWIRE VASC L260CM DIA0.035IN L7CM DIA3MM J TIP PTFE S

## (undated) DEVICE — MEDI-VAC NON-CONDUCTIVE SUCTION TUBING: Brand: CARDINAL HEALTH

## (undated) DEVICE — TRAY CATHETER 16FR F INCLUDE LUB URIN M TEMP SENS 2 STATLOK STBL

## (undated) DEVICE — BAG RED 3PLY 2MIL 30X40 IN

## (undated) DEVICE — GLOVE SURG SZ 7.5 L11.2IN THK9.8MIL STRW LTX POLYMER BEAD

## (undated) DEVICE — ELECTROSURGICAL DEVICE HOLSTER;FOR USE WITH MAXIMUM PEAK VOLTAGE OF 4000 V: Brand: FORCE TRIVERSE

## (undated) DEVICE — KIT MED IMAG CNTRST AGNT W/ IOPAMIDOL REUSE

## (undated) DEVICE — DRAPE,UTILTY,TAPE,15X26, 4EA/PK: Brand: MEDLINE

## (undated) DEVICE — KIT HND CTRL 3 W STPCOCK ROT END 54IN PREM HI PRSS TBNG AT

## (undated) DEVICE — SYR 3ML LL TIP 1/10ML GRAD --